# Patient Record
Sex: FEMALE | Race: WHITE | NOT HISPANIC OR LATINO | Employment: FULL TIME | URBAN - METROPOLITAN AREA
[De-identification: names, ages, dates, MRNs, and addresses within clinical notes are randomized per-mention and may not be internally consistent; named-entity substitution may affect disease eponyms.]

---

## 2017-06-27 ENCOUNTER — OFFICE VISIT (OUTPATIENT)
Dept: FAMILY MEDICINE | Facility: CLINIC | Age: 62
End: 2017-06-27
Payer: COMMERCIAL

## 2017-06-27 ENCOUNTER — TELEPHONE (OUTPATIENT)
Dept: FAMILY MEDICINE | Facility: CLINIC | Age: 62
End: 2017-06-27

## 2017-06-27 VITALS
BODY MASS INDEX: 28.17 KG/M2 | TEMPERATURE: 97.4 F | HEART RATE: 64 BPM | OXYGEN SATURATION: 96 % | WEIGHT: 159 LBS | SYSTOLIC BLOOD PRESSURE: 130 MMHG | HEIGHT: 63 IN | DIASTOLIC BLOOD PRESSURE: 74 MMHG

## 2017-06-27 DIAGNOSIS — M62.89 MUSCLE FATIGUE: Primary | ICD-10-CM

## 2017-06-27 DIAGNOSIS — R53.81 PHYSICAL DECONDITIONING: ICD-10-CM

## 2017-06-27 LAB
BASOPHILS # BLD AUTO: 0 10E9/L (ref 0–0.2)
BASOPHILS NFR BLD AUTO: 0.5 %
DIFFERENTIAL METHOD BLD: NORMAL
EOSINOPHIL # BLD AUTO: 0.1 10E9/L (ref 0–0.7)
EOSINOPHIL NFR BLD AUTO: 1.8 %
ERYTHROCYTE [DISTWIDTH] IN BLOOD BY AUTOMATED COUNT: 13.2 % (ref 10–15)
HCT VFR BLD AUTO: 40.7 % (ref 35–47)
HGB BLD-MCNC: 13.6 G/DL (ref 11.7–15.7)
LYMPHOCYTES # BLD AUTO: 2.5 10E9/L (ref 0.8–5.3)
LYMPHOCYTES NFR BLD AUTO: 32 %
MCH RBC QN AUTO: 29 PG (ref 26.5–33)
MCHC RBC AUTO-ENTMCNC: 33.4 G/DL (ref 31.5–36.5)
MCV RBC AUTO: 87 FL (ref 78–100)
MONOCYTES # BLD AUTO: 0.8 10E9/L (ref 0–1.3)
MONOCYTES NFR BLD AUTO: 9.4 %
NEUTROPHILS # BLD AUTO: 4.5 10E9/L (ref 1.6–8.3)
NEUTROPHILS NFR BLD AUTO: 56.3 %
PLATELET # BLD AUTO: 300 10E9/L (ref 150–450)
RBC # BLD AUTO: 4.69 10E12/L (ref 3.8–5.2)
WBC # BLD AUTO: 7.9 10E9/L (ref 4–11)

## 2017-06-27 PROCEDURE — 85025 COMPLETE CBC W/AUTO DIFF WBC: CPT | Performed by: NURSE PRACTITIONER

## 2017-06-27 PROCEDURE — 84443 ASSAY THYROID STIM HORMONE: CPT | Performed by: NURSE PRACTITIONER

## 2017-06-27 PROCEDURE — 80048 BASIC METABOLIC PNL TOTAL CA: CPT | Performed by: NURSE PRACTITIONER

## 2017-06-27 PROCEDURE — 99204 OFFICE O/P NEW MOD 45 MIN: CPT | Performed by: NURSE PRACTITIONER

## 2017-06-27 PROCEDURE — 82550 ASSAY OF CK (CPK): CPT | Performed by: NURSE PRACTITIONER

## 2017-06-27 PROCEDURE — 36415 COLL VENOUS BLD VENIPUNCTURE: CPT | Performed by: NURSE PRACTITIONER

## 2017-06-27 RX ORDER — DIPHENOXYLATE HYDROCHLORIDE AND ATROPINE SULFATE 2.5; .025 MG/1; MG/1
1 TABLET ORAL DAILY
COMMUNITY
Start: 2012-06-22 | End: 2022-12-26

## 2017-06-27 NOTE — PROGRESS NOTES
"  SUBJECTIVE:                                                    Izabella Concepcion is a 61 year old female who presents to clinic today for the following health issues:      Musculoskeletal problem/pain      Duration: ongoing    Description  Location: both legs, generalized    Intensity:  mild, moderate    Accompanying signs and symptoms: weakness of legs, no fever , chills, rash, joint pain, headache, back pain;  Feels a lumpiness in her legs;  Denies chest pain or SOB , no new swelling of legs or ankles    History  Previous similar problem: YES- ongoing  Previous evaluation:  none    Precipitating or alleviating factors:  Trauma or overuse: no   Aggravating factors include: climbing stairs    Therapies tried and outcome: nothing  Has an appt to establish with Dr Wang for early August     Problem list and histories reviewed & adjusted, as indicated.  Additional history: as documented    There is no problem list on file for this patient.    History reviewed. No pertinent surgical history.    Social History   Substance Use Topics     Smoking status: Former Smoker     Smokeless tobacco: Never Used     Alcohol use Yes     History reviewed. No pertinent family history.      Current Outpatient Prescriptions   Medication Sig Dispense Refill     Multiple Vitamin (MULTI-VITAMINS) TABS Take 1 tablet by mouth       Allergies   Allergen Reactions     Vigamox [Moxifloxacin] Swelling       Reviewed and updated as needed this visit by clinical staff       Reviewed and updated as needed this visit by Provider         ROS:  Constitutional, HEENT, cardiovascular, pulmonary, gi and gu systems are negative, except as otherwise noted.  See HPI    OBJECTIVE:     /74 (BP Location: Left arm, Cuff Size: Adult Regular)  Pulse 64  Temp 97.4  F (36.3  C) (Tympanic)  Ht 5' 3\" (1.6 m)  Wt 159 lb (72.1 kg)  SpO2 96%  BMI 28.17 kg/m2  Body mass index is 28.17 kg/(m^2).  GENERAL: overweight,  alert and no distress  EYES: Eyes grossly " normal to inspection, PERRL and conjunctivae and sclerae normal  HENT: ear canals and TM's normal, nose and mouth without ulcers or lesions  NECK: no adenopathy, no asymmetry, masses, or scars and thyroid normal to palpation  RESP: lungs clear to auscultation - no rales, rhonchi or wheezes  CV: regular rate and rhythm, normal S1 S2, no S3 or S4, no murmur, click or rub, no peripheral edema and peripheral pulses strong  ABDOMEN: soft, nontender, no hepatosplenomegaly, no masses and bowel sounds normal  MS: no gross musculoskeletal defects noted, no edema, adipose tissue lumpy  SKIN: no suspicious lesions or rashes  PSYCH: mentation appears normal, affect normal/bright    Diagnostic Test Results: Addended   Results for orders placed or performed in visit on 06/27/17   CBC with platelets differential   Result Value Ref Range    WBC 7.9 4.0 - 11.0 10e9/L    RBC Count 4.69 3.8 - 5.2 10e12/L    Hemoglobin 13.6 11.7 - 15.7 g/dL    Hematocrit 40.7 35.0 - 47.0 %    MCV 87 78 - 100 fl    MCH 29.0 26.5 - 33.0 pg    MCHC 33.4 31.5 - 36.5 g/dL    RDW 13.2 10.0 - 15.0 %    Platelet Count 300 150 - 450 10e9/L    Diff Method Automated Method     % Neutrophils 56.3 %    % Lymphocytes 32.0 %    % Monocytes 9.4 %    % Eosinophils 1.8 %    % Basophils 0.5 %    Absolute Neutrophil 4.5 1.6 - 8.3 10e9/L    Absolute Lymphocytes 2.5 0.8 - 5.3 10e9/L    Absolute Monocytes 0.8 0.0 - 1.3 10e9/L    Absolute Eosinophils 0.1 0.0 - 0.7 10e9/L    Absolute Basophils 0.0 0.0 - 0.2 10e9/L   CK total   Result Value Ref Range    CK Total 134 30 - 225 U/L   Basic metabolic panel  (Ca, Cl, CO2, Creat, Gluc, K, Na, BUN)   Result Value Ref Range    Sodium 140 133 - 144 mmol/L    Potassium 4.3 3.4 - 5.3 mmol/L    Chloride 107 94 - 109 mmol/L    Carbon Dioxide 29 20 - 32 mmol/L    Anion Gap 4 3 - 14 mmol/L    Glucose 88 70 - 99 mg/dL    Urea Nitrogen 19 7 - 30 mg/dL    Creatinine 0.69 0.52 - 1.04 mg/dL    GFR Estimate 85 >60 mL/min/1.7m2    GFR Estimate If  Black >90   GFR Calc   >60 mL/min/1.7m2    Calcium 9.5 8.5 - 10.1 mg/dL   TSH with free T4 reflex   Result Value Ref Range    TSH 0.81 0.40 - 4.00 mU/L       ASSESSMENT/PLAN:         ICD-10-CM    1. Muscle fatigue M62.89 CBC with platelets differential     CK total     Basic metabolic panel  (Ca, Cl, CO2, Creat, Gluc, K, Na, BUN)     TSH with free T4 reflex   2. Physical deconditioning R53.81    begin exercise regimen by pacing acitivity starting with walking, also suggest beginning stairs  Follow up with Dr Wang as planned  LUIGI Dwyer [facility]

## 2017-06-27 NOTE — LETTER
[facility]  [address] [place] [address] [facility]  [address]  [place]  [address]  Tel: [phone]    July 3, 2017    Izabella Concepcion  [address] [address]        Dear Ms. Concepcion,    All of the tests we ran to assess your muscle fatigue turned out great.  No thyroid abnormality, nor muscle enzyme elevation,  Electrolytes are normal and no evidence of anemia or infection.  Please follow up if you continue to have questions or problems, and follow up with Dr Jacob as planned.    If you have any further questions or problems, please contact our office.      Sincerely,    Dorina Tai NP/wilbert    Results for orders placed or performed in visit on 06/27/17   CBC with platelets differential   Result Value Ref Range    WBC 7.9 4.0 - 11.0 10e9/L    RBC Count 4.69 3.8 - 5.2 10e12/L    Hemoglobin 13.6 11.7 - 15.7 g/dL    Hematocrit 40.7 35.0 - 47.0 %    MCV 87 78 - 100 fl    MCH 29.0 26.5 - 33.0 pg    MCHC 33.4 31.5 - 36.5 g/dL    RDW 13.2 10.0 - 15.0 %    Platelet Count 300 150 - 450 10e9/L    Diff Method Automated Method     % Neutrophils 56.3 %    % Lymphocytes 32.0 %    % Monocytes 9.4 %    % Eosinophils 1.8 %    % Basophils 0.5 %    Absolute Neutrophil 4.5 1.6 - 8.3 10e9/L    Absolute Lymphocytes 2.5 0.8 - 5.3 10e9/L    Absolute Monocytes 0.8 0.0 - 1.3 10e9/L    Absolute Eosinophils 0.1 0.0 - 0.7 10e9/L    Absolute Basophils 0.0 0.0 - 0.2 10e9/L   CK total   Result Value Ref Range    CK Total 134 30 - 225 U/L   Basic metabolic panel  (Ca, Cl, CO2, Creat, Gluc, K, Na, BUN)   Result Value Ref Range    Sodium 140 133 - 144 mmol/L    Potassium 4.3 3.4 - 5.3 mmol/L    Chloride 107 94 - 109 mmol/L    Carbon Dioxide 29 20 - 32 mmol/L    Anion Gap 4 3 - 14 mmol/L    Glucose 88 70 - 99 mg/dL    Urea Nitrogen 19 7 - 30 mg/dL    Creatinine 0.69 0.52 - 1.04 mg/dL    GFR Estimate 85 >60 mL/min/1.7m2    GFR Estimate If Black >90   GFR Calc   >60 mL/min/1.7m2    Calcium 9.5 8.5 - 10.1 mg/dL   TSH with  free T4 reflex   Result Value Ref Range    TSH 0.81 0.40 - 4.00 mU/L           Enclosure: Lab Results

## 2017-06-27 NOTE — NURSING NOTE
"Chief Complaint   Patient presents with     Musculoskeletal Problem       Initial /74 (BP Location: Left arm, Cuff Size: Adult Regular)  Pulse 64  Temp 97.4  F (36.3  C) (Tympanic)  Ht 5' 3\" (1.6 m)  Wt 159 lb (72.1 kg)  SpO2 96%  BMI 28.17 kg/m2 Estimated body mass index is 28.17 kg/(m^2) as calculated from the following:    Height as of this encounter: 5' 3\" (1.6 m).    Weight as of this encounter: 159 lb (72.1 kg).  Medication Reconciliation: complete     Suellen Hernandez MA    "

## 2017-06-27 NOTE — TELEPHONE ENCOUNTER
Reason for Call:  Other     Detailed comments: Lumpy painful leg muscules    Phone Number Patient can be reached at: Home number on file [phone] (home)    Best Time: anytime    Can we leave a detailed message on this number? YES    Call taken on 6/27/2017 at 7:09 AM by Avery Gomez

## 2017-06-27 NOTE — MR AVS SNAPSHOT
After Visit Summary   6/27/2017    Izabella Concepcion    MRN: [id number]           Patient Information     Date Of Birth          1955        Visit Information        Provider Department      6/27/2017 4:00 PM Dorina Tai APRN CNP [facility]        Today's Diagnoses     Muscle fatigue    -  1       Follow-ups after your visit        Your next 10 appointments already scheduled     Jun 28, 2017  9:15 AM CDT   MA SCREENING DIGITAL BILATERAL with [id number]   [facility] ([facility])    [address], [address]  [facility] [id number] [phone]           Do not use any powder, lotion or deodorant under your arms or on your breast. If you do, we will ask you to remove it before your exam.  Wear comfortable, two-piece clothing.  If you have any allergies, tell your care team.  Bring any previous mammograms from other facilities or have them mailed to the breast center. This mammogram location, [facility], now offers 3D mammography. It doesn't replace a screening mammogram and can be done with a regular screening mammogram. It is optional and not all insurances will pay for it. 3D mammography is a special kind of mammogram that produces a three-dimensional image of the breast by using low dose-xrays. 3D allows the radiologist to see the breast tissue differently from 2D, which reduces the chance of repeat testing due to overlapping breast tissue. If you are interested in have a 3D mammogram, please check with your insurance before you arrive for your exam. On the day of your exam you will be asked if you would like 3D imaging.            Aug 03, 2017  6:00 PM CDT   Office Visit with Blanco Wang MD   [facility]    [address] [id number] [phone]           Bring a current list of meds and any records pertaining to this visit.  For Physicals, please bring  "immunization records and any forms needing to be filled out.  Please arrive 10 minutes early to complete paperwork.              Future tests that were ordered for you today     Open Future Orders        Priority Expected Expires Ordered    MA Screening Digital Bilateral Routine  2018            Who to contact     If you have questions or need follow up information about today's clinic visit or your schedule please contact [facility] directly at [phone].  Normal or non-critical lab and imaging results will be communicated to you by [employer], letter or phone within 4 business days after the clinic has received the results. If you do not hear from us within 7 days, please contact the clinic through [employer] or phone. If you have a critical or abnormal lab result, we will notify you by phone as soon as possible.  Submit refill requests through [employer] or call your pharmacy and they will forward the refill request to us. Please allow 3 business days for your refill to be completed.          Additional Information About Your Visit        [employer] Information     [employer] lets you send messages to your doctor, view your test results, renew your prescriptions, schedule appointments and more. To sign up, go to [email or web address] . Click on \"Log in\" on the left side of the screen, which will take you to the Welcome page. Then click on \"Sign up Now\" on the right side of the page.     You will be asked to enter the access code listed below, as well as some personal information. Please follow the directions to create your username and password.     Your access code is: [email or web address]  Expires: 2017  4:35 PM     Your access code will  in 90 days. If you need help or a new code, please call your [place] clinic or [phone].        Care EveryWhere ID     This is your Care EveryWhere ID. This could be used by other organizations to access your [place] medical records  [id number]      " "  Your Vitals Were     Pulse Temperature Height Pulse Oximetry BMI (Body Mass Index)       64 97.4  F (36.3  C) (Tympanic) 5' 3\" (1.6 m) 96% 28.17 kg/m2        Blood Pressure from Last 3 Encounters:   06/27/17 130/74    Weight from Last 3 Encounters:   06/27/17 159 lb (72.1 kg)              We Performed the Following     Basic metabolic panel  (Ca, Cl, CO2, Creat, Gluc, K, Na, BUN)     CBC with platelets differential     CK total     TSH with free T4 reflex        Primary Care Provider    None Specified       No primary provider on file.        Equal Access to Services     [facility]: Hadroberta Dixon, usha freitas, robin bernabe, karo zarate . So [facility] [phone].    ATENCIÓN: Si habla español, tiene a curiel disposición servicios gratuitos de asistencia lingüística. Llame al [phone].    We comply with applicable federal civil rights laws and [place] laws. We do not discriminate on the basis of race, color, national origin, age, disability sex, sexual orientation or gender identity.            Thank you!     Thank you for choosing [facility]  for your care. Our goal is always to provide you with excellent care. Hearing back from our patients is one way we can continue to improve our services. Please take a few minutes to complete the written survey that you may receive in the mail after your visit with us. Thank you!             Your Updated Medication List - Protect others around you: Learn how to safely use, store and throw away your medicines at www.disposemymeds.org.          This list is accurate as of: 6/27/17  4:35 PM.  Always use your most recent med list.                   Brand Name Dispense Instructions for use Diagnosis    MULTI-VITAMINS Tabs      Take 1 tablet by mouth          "

## 2017-06-27 NOTE — TELEPHONE ENCOUNTER
Closing this encounter due to the patient decided to come in today and  See Dorina Tai    Thank you

## 2017-06-28 ENCOUNTER — HOSPITAL ENCOUNTER (OUTPATIENT)
Dept: MAMMOGRAPHY | Facility: CLINIC | Age: 62
Discharge: HOME | End: 2017-06-28
Attending: OBSTETRICS & GYNECOLOGY | Admitting: OBSTETRICS & GYNECOLOGY
Payer: COMMERCIAL

## 2017-06-28 DIAGNOSIS — Z12.31 VISIT FOR SCREENING MAMMOGRAM: ICD-10-CM

## 2017-06-28 LAB
ANION GAP SERPL CALCULATED.3IONS-SCNC: 4 MMOL/L (ref 3–14)
BUN SERPL-MCNC: 19 MG/DL (ref 7–30)
CALCIUM SERPL-MCNC: 9.5 MG/DL (ref 8.5–10.1)
CHLORIDE SERPL-SCNC: 107 MMOL/L (ref 94–109)
CK SERPL-CCNC: 134 U/L (ref 30–225)
CO2 SERPL-SCNC: 29 MMOL/L (ref 20–32)
CREAT SERPL-MCNC: 0.69 MG/DL (ref 0.52–1.04)
GFR SERPL CREATININE-BSD FRML MDRD: 85 ML/MIN/1.7M2
GLUCOSE SERPL-MCNC: 88 MG/DL (ref 70–99)
POTASSIUM SERPL-SCNC: 4.3 MMOL/L (ref 3.4–5.3)
SODIUM SERPL-SCNC: 140 MMOL/L (ref 133–144)
TSH SERPL DL<=0.005 MIU/L-ACNC: 0.81 MU/L (ref 0.4–4)

## 2017-06-28 PROCEDURE — G0202 SCR MAMMO BI INCL CAD: HCPCS

## 2017-07-01 NOTE — PROGRESS NOTES
All of the tests we ran to assess your muscle fatigue turned out great.  No thyroid abnormality, nor muscle enzyme elevation,  Electrolytes are normal and no evidence of anemia or infection.  Please follow up if you continue to have questions or problems, and follow up with Dr Jacob as planned.

## 2017-08-14 ENCOUNTER — TRANSFERRED RECORDS (OUTPATIENT)
Dept: HEALTH INFORMATION MANAGEMENT | Facility: CLINIC | Age: 62
End: 2017-08-14

## 2017-08-30 ENCOUNTER — TRANSFERRED RECORDS (OUTPATIENT)
Dept: HEALTH INFORMATION MANAGEMENT | Facility: CLINIC | Age: 62
End: 2017-08-30

## 2017-08-30 LAB — PAP SMEAR - HIM PATIENT REPORTED: NEGATIVE

## 2017-09-15 ENCOUNTER — OFFICE VISIT (OUTPATIENT)
Dept: FAMILY MEDICINE | Facility: CLINIC | Age: 62
End: 2017-09-15
Payer: COMMERCIAL

## 2017-09-15 VITALS
TEMPERATURE: 98.3 F | SYSTOLIC BLOOD PRESSURE: 108 MMHG | WEIGHT: 158.3 LBS | HEART RATE: 73 BPM | BODY MASS INDEX: 28.05 KG/M2 | OXYGEN SATURATION: 96 % | DIASTOLIC BLOOD PRESSURE: 70 MMHG | HEIGHT: 63 IN

## 2017-09-15 DIAGNOSIS — E78.5 HYPERLIPIDEMIA LDL GOAL <130: ICD-10-CM

## 2017-09-15 DIAGNOSIS — R29.898 WEAKNESS OF BOTH LOWER EXTREMITIES: Primary | ICD-10-CM

## 2017-09-15 DIAGNOSIS — Z23 NEED FOR PROPHYLACTIC VACCINATION AND INOCULATION AGAINST INFLUENZA: ICD-10-CM

## 2017-09-15 DIAGNOSIS — J45.40 MODERATE PERSISTENT ASTHMA WITHOUT COMPLICATION: ICD-10-CM

## 2017-09-15 DIAGNOSIS — Z78.9 STATIN INTOLERANCE: ICD-10-CM

## 2017-09-15 DIAGNOSIS — Z23 NEED FOR PROPHYLACTIC VACCINATION WITH TETANUS-DIPHTHERIA (TD): ICD-10-CM

## 2017-09-15 PROCEDURE — 99214 OFFICE O/P EST MOD 30 MIN: CPT | Performed by: INTERNAL MEDICINE

## 2017-09-15 RX ORDER — ALBUTEROL SULFATE 90 UG/1
2 AEROSOL, METERED RESPIRATORY (INHALATION) EVERY 6 HOURS PRN
Qty: 1 INHALER | Refills: 1 | COMMUNITY
Start: 2017-09-15 | End: 2017-09-15

## 2017-09-15 RX ORDER — ALBUTEROL SULFATE 90 UG/1
2 AEROSOL, METERED RESPIRATORY (INHALATION) EVERY 6 HOURS PRN
Qty: 1 INHALER | Refills: 11 | Status: SHIPPED | OUTPATIENT
Start: 2017-09-15 | End: 2017-11-30

## 2017-09-15 RX ORDER — CLONAZEPAM 0.25 MG/1
0.25 TABLET, ORALLY DISINTEGRATING ORAL 2 TIMES DAILY PRN
COMMUNITY
Start: 2015-04-16 | End: 2017-09-15

## 2017-09-15 RX ORDER — VITAMIN B COMPLEX
1 TABLET ORAL DAILY
COMMUNITY
Start: 2015-04-16 | End: 2018-10-21

## 2017-09-15 NOTE — NURSING NOTE
"Chief Complaint   Patient presents with     Establish Care       Initial /70 (BP Location: Right arm, Patient Position: Chair, Cuff Size: Adult Regular)  Pulse 73  Temp 98.3  F (36.8  C) (Oral)  Ht 5' 3\" (1.6 m)  Wt 158 lb 4.8 oz (71.8 kg)  SpO2 96%  BMI 28.04 kg/m2 Estimated body mass index is 28.04 kg/(m^2) as calculated from the following:    Height as of this encounter: 5' 3\" (1.6 m).    Weight as of this encounter: 158 lb 4.8 oz (71.8 kg).  Medication Reconciliation: complete   Sabra Medrano MA  "

## 2017-09-15 NOTE — PROGRESS NOTES
"  SUBJECTIVE:   Izabella Concepcion is a 61 year old female who presents to clinic today for the following health issues:    New Patient/Transfer of Care    She describes gradual onset of progressive weakness in bilateral legs over 2-3 years.   She also describes a burning sensation in skin of bilateral legs that is intermittent and mild and occurs when she is resting after exercise.  Despite this, she tries to walk 5-6 miles per day for exercise.   No similar symptoms in her arms.  Since she has tried to exercise more, recently, she feels like she is 50-60% better, and feels like these symptoms may be \"on the mend\" as she is getting more conditioned.  Her conditioning has been derailed in the past due to asthma that had been difficult to diagnose, and a recent right arm fracture.   She has hyperlipidemia and statin intolerance.    Problem list and histories reviewed & adjusted, as indicated.  Additional history: as documented    Patient Active Problem List   Diagnosis     Moderate persistent asthma without complication     Statin intolerance     Hyperlipidemia LDL goal <130     Past Surgical History:   Procedure Laterality Date     ORTHOPEDIC SURGERY Right     right arm fracture     TONSILLECTOMY         Social History   Substance Use Topics     Smoking status: Former Smoker     Packs/day: 2.00     Years: 6.00     Smokeless tobacco: Never Used     Alcohol use Yes      Comment: 5-6 drinks per month     Family History   Problem Relation Age of Onset     CEREBROVASCULAR DISEASE Mother      hemorrhagic stroke     Myocardial Infarction Father 29     DIABETES Father          Current Outpatient Prescriptions   Medication Sig Dispense Refill     Ascorbic Acid (C-500) 500 MG CHEW Take 500 mg by mouth daily       B Complex Vitamins (VITAMIN-B COMPLEX) TABS Take 1 tablet by mouth daily       Multiple Vitamin (MULTI-VITAMINS) TABS Take 1 tablet by mouth       clonazePAM (KLONOPIN) 0.25 MG TBDP ODT tab Take 0.25 mg by mouth 2 times " "daily as needed       ASMANEX 120 METERED DOSES 220 MCG/INH Inhaler Inhale 2 puffs into the lungs daily as needed  1     Allergies   Allergen Reactions     Vigamox [Moxifloxacin] Swelling         Reviewed and updated as needed this visit by clinical staff  Tobacco  Allergies  Meds  Med Hx  Surg Hx  Fam Hx  Soc Hx      Reviewed and updated as needed this visit by Provider  Tobacco  Med Hx  Surg Hx  Fam Hx  Soc Hx        ROS:  Constitutional, HEENT, cardiovascular, pulmonary, gi and gu systems are negative, except as otherwise noted.      OBJECTIVE:   /70 (BP Location: Right arm, Patient Position: Chair, Cuff Size: Adult Regular)  Pulse 73  Temp 98.3  F (36.8  C) (Oral)  Ht 5' 3\" (1.6 m)  Wt 158 lb 4.8 oz (71.8 kg)  SpO2 96%  BMI 28.04 kg/m2  Body mass index is 28.04 kg/(m^2).  GENERAL: healthy, alert and no distress  EYES: Eyes grossly normal to inspection, PERRL and conjunctivae and sclerae normal  HENT: ear canals and TM's normal, nose and mouth without ulcers or lesions  NECK: no adenopathy, no asymmetry, masses, or scars and thyroid normal to palpation  RESP: lungs clear to auscultation - no rales, rhonchi or wheezes  CV: regular rate and rhythm, normal S1 S2, no S3 or S4, no murmur, click or rub, no peripheral edema and peripheral pulses strong  ABDOMEN: soft, nontender, no hepatosplenomegaly, no masses and bowel sounds normal  MS: no gross musculoskeletal defects noted, no edema  SKIN: no suspicious lesions or rashes  NEURO: Cranial nerves 2-12 appear grossly intact.   Normal strength in all muscle groups, normal sensation to light touch in all lower extremity dermatomes, deep tendon reflexes are 2+ bilaterally at the patella and Achilles tendons, straight leg raises do not elicit radicular symptoms bilaterally.  PSYCH: mentation appears normal, affect normal/bright    Diagnostic Test Results:  Reviewed lipid panel from 2015 demonstrating severe hyperlipidemia, but her 10 year risk for " atherosclerotic vascular disease is less than 7.5%    ASSESSMENT/PLAN:       1. Weakness of both lower extremities  The improving weakness and dysesthesias in her bilateral legs have an unclear etiology. I am encouraged that her symptoms seem to be improving with conditioning. The patient and I agreed that we would give extra time to see if further conditioning can resolve her symptoms prior to initiating a diagnostic evaluation. If further conditioning does not improve her symptoms adequately, consider imaging of the neuro access to exclude demyelinating conditions, electromyography studies of the lower extremities, HONEY? She will contact me sooner to initiate those evaluations if her symptoms seem to worsen between now and our planned follow-up visit in 2-3 months.    2. Moderate persistent asthma without complication  She questioned whether she needs to continue taking her Asmanex, she has not been taking it for several weeks. I recommended that she return to taking Asmanex, and that we recheck an asthma control test when she returns in 2-3 months. I also updated her prescription for her rescue inhaler.  - albuterol (PROAIR HFA/PROVENTIL HFA/VENTOLIN HFA) 108 (90 BASE) MCG/ACT Inhaler; Inhale 2 puffs into the lungs every 6 hours as needed for shortness of breath / dyspnea or wheezing  Dispense: 1 Inhaler; Refill: 11  - ASMANEX 120 METERED DOSES 220 MCG/INH Inhaler; Inhale 2 puffs into the lungs daily as needed  Dispense: 1 Inhaler; Refill: 11    3. Statin intolerance      4. Hyperlipidemia LDL goal <130  Her cholesterol is markedly elevated, however her 10 year risk is not appreciably elevated, I suggested that she could try to improve her LDL cholesterol by eating more fiber. I even recommended Dr. Ocampo' line of food products that are designed to reduce cholesterol in patients with statin intolerance.    5. Need for prophylactic vaccination with tetanus-diphtheria (TD)  She declined a tetanus booster  today    6. Need for prophylactic vaccination and inoculation against influenza  She declined influenza vaccine today      FUTURE APPOINTMENTS:       - Follow-up visit in 2-3 months to follow up on above issues or sooner if needed    Blanco Wang MD  [facility]

## 2017-09-15 NOTE — MR AVS SNAPSHOT
After Visit Summary   9/15/2017    Izabella Concepcion    MRN: [id number]           Patient Information     Date Of Birth          1955        Visit Information        Provider Department      9/15/2017 1:00 PM Blanco Wang MD [facility]        Today's Diagnoses     Weakness of both lower extremities    -  1    Moderate persistent asthma without complication        Statin intolerance        Hyperlipidemia LDL goal <130        Need for prophylactic vaccination with tetanus-diphtheria (TD)        Need for prophylactic vaccination and inoculation against influenza           Follow-ups after your visit        Your next 10 appointments already scheduled     Nov 30, 2017 10:00 AM CST   Office Visit with Blanco Wang MD   [facility]    [address] [facility] [address] [phone]           Bring a current list of meds and any records pertaining to this visit. For Physicals, please bring immunization records and any forms needing to be filled out. Please arrive 10 minutes early to complete paperwork.              Who to contact     If you have questions or need follow up information about today's clinic visit or your schedule please contact [facility] directly at [phone].  Normal or non-critical lab and imaging results will be communicated to you by [employer], letter or phone within 4 business days after the clinic has received the results. If you do not hear from us within 7 days, please contact the clinic through [employer] or phone. If you have a critical or abnormal lab result, we will notify you by phone as soon as possible.  Submit refill requests through [employer] or call your pharmacy and they will forward the refill request to us. Please allow 3 business days for your refill to be completed.          Additional Information About Your Visit        [employer] Information     [employer] lets you send messages to your doctor, view  "your test results, renew your prescriptions, schedule appointments and more. To sign up, go to [email or web address] . Click on \"Log in\" on the left side of the screen, which will take you to the Welcome page. Then click on \"Sign up Now\" on the right side of the page.     You will be asked to enter the access code listed below, as well as some personal information. Please follow the directions to create your username and password.     Your access code is: [email or web address]  Expires: 2017  4:35 PM     Your access code will  in 90 days. If you need help or a new code, please call your [place] clinic or [phone].        Care EveryWhere ID     This is your Care EveryWhere ID. This could be used by other organizations to access your [place] medical records  [id number]        Your Vitals Were     Pulse Temperature Height Pulse Oximetry BMI (Body Mass Index)       73 98.3  F (36.8  C) (Oral) 5' 3\" (1.6 m) 96% 28.04 kg/m2        Blood Pressure from Last 3 Encounters:   09/15/17 108/70   17 130/74    Weight from Last 3 Encounters:   09/15/17 158 lb 4.8 oz (71.8 kg)   17 159 lb (72.1 kg)              We Performed the Following     QualtrÃ© ACCESS CODE - Add PCP and Click on cosign on right          Today's Medication Changes          These changes are accurate as of: 9/15/17  5:40 PM.  If you have any questions, ask your nurse or doctor.               Stop taking these medicines if you haven't already. Please contact your care team if you have questions.     clonazePAM 0.25 MG Tbdp ODT tab   Commonly known as:  klonoPIN   Stopped by:  Blanco Wang MD                Where to get your medicines      These medications were sent to [facility], [place] - [address]  [address], [address], [facility] [address]     Phone:  [phone]     albuterol 108 (90 BASE) MCG/ACT Inhaler    ASMANEX 120 METERED DOSES 220 MCG/INH Inhaler                Primary Care " Provider Office Phone # Fax #    Blanco Wang [phone]       [facility] [address] [facility] [address]  [facility] [address]        Equal Access to Services     DIONNE MONTOYA : Hadii aad ku hadsindhuo Soomaali, waaxda luqadaha, qaybta kaalmada adeegyada, waxankur salasn britta lemons tessa villaseñor. So [facility] [phone].    ATENCIÓN: Si habla español, tiene a curiel disposición servicios gratuitos de asistencia lingüística. Llame al [phone].    We comply with applicable federal civil rights laws and [place] laws. We do not discriminate on the basis of race, color, national origin, age, disability sex, sexual orientation or gender identity.            Thank you!     Thank you for choosing [facility]  for your care. Our goal is always to provide you with excellent care. Hearing back from our patients is one way we can continue to improve our services. Please take a few minutes to complete the written survey that you may receive in the mail after your visit with us. Thank you!             Your Updated Medication List - Protect others around you: Learn how to safely use, store and throw away your medicines at www.disposemymeds.org.          This list is accurate as of: 9/15/17  5:40 PM.  Always use your most recent med list.                   Brand Name Dispense Instructions for use Diagnosis    albuterol 108 (90 BASE) MCG/ACT Inhaler    PROAIR HFA/PROVENTIL HFA/VENTOLIN HFA    1 Inhaler    Inhale 2 puffs into the lungs every 6 hours as needed for shortness of breath / dyspnea or wheezing    Moderate persistent asthma without complication       ASMANEX 120 METERED DOSES 220 MCG/INH Inhaler   Generic drug:  mometasone     1 Inhaler    Inhale 2 puffs into the lungs daily as needed    Moderate persistent asthma without complication       C-500 500 MG Chew   Generic drug:  Ascorbic Acid      Take 500 mg by mouth daily        MULTI-VITAMINS Tabs      Take 1 tablet by mouth        Vitamin-B  Complex Tabs      Take 1 tablet by mouth daily

## 2017-09-16 ENCOUNTER — HEALTH MAINTENANCE LETTER (OUTPATIENT)
Age: 62
End: 2017-09-16

## 2017-09-16 ASSESSMENT — ASTHMA QUESTIONNAIRES: ACT_TOTALSCORE: 20

## 2017-10-31 NOTE — PROGRESS NOTES
SUBJECTIVE:   Izabella Concepcion is a 61 year old female who presents to clinic today for the following health issues:      ED/UC Followup:    Facility:  [facility]  Date of visit: 10/28/17  Reason for visit: Cough  Current Status: at home       Cough and URI symptoms starting on 10/10  Seen in urgent care on 10/28 and prescribed Z-pack and albuterol solution, but she did not have nebulizer machine  She feels better, but not perfect   She has improved cough with sputum, she denies fevers, describes fatigue and mild dyspnea     Problem list and histories reviewed & adjusted, as indicated.  Additional history: as documented    Patient Active Problem List   Diagnosis     Moderate persistent asthma without complication     Statin intolerance     Hyperlipidemia LDL goal <130     Past Surgical History:   Procedure Laterality Date     ORTHOPEDIC SURGERY Right     right arm fracture     TONSILLECTOMY         Social History   Substance Use Topics     Smoking status: Former Smoker     Packs/day: 2.00     Years: 6.00     Smokeless tobacco: Never Used     Alcohol use Yes      Comment: 5-6 drinks per month     Family History   Problem Relation Age of Onset     CEREBROVASCULAR DISEASE Mother      hemorrhagic stroke     Myocardial Infarction Father 29     DIABETES Father          Current Outpatient Prescriptions   Medication Sig Dispense Refill     Ascorbic Acid (C-500) 500 MG CHEW Take 500 mg by mouth daily       B Complex Vitamins (VITAMIN-B COMPLEX) TABS Take 1 tablet by mouth daily       albuterol (PROAIR HFA/PROVENTIL HFA/VENTOLIN HFA) 108 (90 BASE) MCG/ACT Inhaler Inhale 2 puffs into the lungs every 6 hours as needed for shortness of breath / dyspnea or wheezing 1 Inhaler 11     ASMANEX 120 METERED DOSES 220 MCG/INH Inhaler Inhale 2 puffs into the lungs daily as needed 1 Inhaler 11     Multiple Vitamin (MULTI-VITAMINS) TABS Take 1 tablet by mouth       Allergies   Allergen Reactions     Vigamox [Moxifloxacin] Swelling  "        Reviewed and updated as needed this visit by clinical staff       Reviewed and updated as needed this visit by Provider         ROS:    Her leg symptoms have improved  She snores and sometimes feels tired in the day, but she does not want to see a sleep specialist for consideration of a sleep study     OBJECTIVE:     /64 (BP Location: Left arm, Patient Position: Chair, Cuff Size: Adult Regular)  Pulse 66  Temp 98.1  F (36.7  C) (Tympanic)  Ht 5' 3\" (1.6 m)  Wt 158 lb (71.7 kg)  SpO2 98%  BMI 27.99 kg/m2  Body mass index is 27.99 kg/(m^2).  GENERAL: healthy, alert and no distress  EYES: Eyes grossly normal to inspection, PERRL and conjunctivae and sclerae normal  HENT: ear canals and TM's normal, nose and mouth without ulcers or lesions  NECK: no adenopathy, no asymmetry, masses, or scars and thyroid normal to palpation  RESP: lungs clear to auscultation - no rales, rhonchi or wheezes  CV: Heart with regular rate and rhythm   MS: no gross musculoskeletal defects noted, no edema  NEURO: Normal strength and tone, mentation intact and speech normal  PSYCH: mentation appears normal, affect normal/bright    Diagnostic Test Results:  none     ASSESSMENT/PLAN:       1. Upper respiratory tract infection, unspecified type  Improving  No clinical evidence of pneumonia   Continue current cares  May take additional 4-6 weeks for all symptoms particularly cough to resolve    2. Moderate persistent asthma without complication  Continue albuterol and asmanex       FUTURE APPOINTMENTS:       - She will return later this month fasting for lipid panel     Blanco Wang MD  [facility]          "

## 2017-11-01 ENCOUNTER — OFFICE VISIT (OUTPATIENT)
Dept: FAMILY MEDICINE | Facility: CLINIC | Age: 62
End: 2017-11-01
Payer: COMMERCIAL

## 2017-11-01 VITALS
DIASTOLIC BLOOD PRESSURE: 64 MMHG | HEART RATE: 66 BPM | SYSTOLIC BLOOD PRESSURE: 105 MMHG | WEIGHT: 158 LBS | OXYGEN SATURATION: 98 % | BODY MASS INDEX: 28 KG/M2 | TEMPERATURE: 98.1 F | HEIGHT: 63 IN

## 2017-11-01 DIAGNOSIS — J45.40 MODERATE PERSISTENT ASTHMA WITHOUT COMPLICATION: ICD-10-CM

## 2017-11-01 DIAGNOSIS — J06.9 UPPER RESPIRATORY TRACT INFECTION, UNSPECIFIED TYPE: Primary | ICD-10-CM

## 2017-11-01 PROCEDURE — 99213 OFFICE O/P EST LOW 20 MIN: CPT | Performed by: INTERNAL MEDICINE

## 2017-11-01 NOTE — NURSING NOTE
"Chief Complaint   Patient presents with     ER/UC Follow UP        Initial /64 (BP Location: Left arm, Patient Position: Chair, Cuff Size: Adult Regular)  Pulse 66  Temp 98.1  F (36.7  C) (Tympanic)  Ht 5' 3\" (1.6 m)  Wt 158 lb (71.7 kg)  SpO2 98%  BMI 27.99 kg/m2 Estimated body mass index is 27.99 kg/(m^2) as calculated from the following:    Height as of this encounter: 5' 3\" (1.6 m).    Weight as of this encounter: 158 lb (71.7 kg)..    BP completed using cuff size: regular  MEDICATIONS REVIEWED  SOCIAL AND FAMILY HX REVIEWED  Cuca Hathaway CMA  "

## 2017-11-01 NOTE — MR AVS SNAPSHOT
"              After Visit Summary   11/1/2017    Izabella Concepcion    MRN: [id number]           Patient Information     Date Of Birth          1955        Visit Information        Provider Department      11/1/2017 2:00 PM Blanco Wang MD [facility]        Today's Diagnoses     Upper respiratory tract infection, unspecified type    -  1    Moderate persistent asthma without complication           Follow-ups after your visit        Your next 10 appointments already scheduled     Nov 30, 2017 10:00 AM CST   Office Visit with Blanco Wang MD   [facility]    [address] [facility] [id number] [phone]           Bring a current list of meds and any records pertaining to this visit. For Physicals, please bring immunization records and any forms needing to be filled out. Please arrive 10 minutes early to complete paperwork.              Who to contact     If you have questions or need follow up information about today's clinic visit or your schedule please contact [facility] directly at [phone].  Normal or non-critical lab and imaging results will be communicated to you by hetrashart, letter or phone within 4 business days after the clinic has received the results. If you do not hear from us within 7 days, please contact the clinic through [employer] or phone. If you have a critical or abnormal lab result, we will notify you by phone as soon as possible.  Submit refill requests through [employer] or call your pharmacy and they will forward the refill request to us. Please allow 3 business days for your refill to be completed.          Additional Information About Your Visit        MyChart Information     [employer] lets you send messages to your doctor, view your test results, renew your prescriptions, schedule appointments and more. To sign up, go to [email or web address] . Click on \"Log in\" on the left side of the screen, which will take " "you to the Welcome page. Then click on \"Sign up Now\" on the right side of the page.     You will be asked to enter the access code listed below, as well as some personal information. Please follow the directions to create your username and password.     Your access code is: [email or web address]  Expires: 2018  2:36 PM     Your access code will  in 90 days. If you need help or a new code, please call your [place] clinic or [phone].        Care EveryWhere ID     This is your Care EveryWhere ID. This could be used by other organizations to access your [place] medical records  [id number]        Your Vitals Were     Pulse Temperature Height Pulse Oximetry BMI (Body Mass Index)       66 98.1  F (36.7  C) (Tympanic) 5' 3\" (1.6 m) 98% 27.99 kg/m2        Blood Pressure from Last 3 Encounters:   17 105/64   09/15/17 108/70   17 130/74    Weight from Last 3 Encounters:   17 158 lb (71.7 kg)   09/15/17 158 lb 4.8 oz (71.8 kg)   17 159 lb (72.1 kg)              Today, you had the following     No orders found for display       Primary Care Provider Office Phone # Fax #    Blanco Wang [phone]       [facility] [address]  [facility] [address]        Equal Access to Services     [facility] HospitalCONOR : Hadii daniel ku hadasho Soomaali, waaxda luqadaha, qaybta kaalmada adeegyagloria, karo zarate . So [facility] [phone].    ATENCIÓN: Si habla cynthia, tiene a curiel disposición servicios gratuitos de asistencia lingüística. Llame al [phone].    We comply with applicable federal civil rights laws and [place] laws. We do not discriminate on the basis of race, color, national origin, age, disability, sex, sexual orientation, or gender identity.            Thank you!     Thank you for choosing [facility]  for your care. Our goal is always to provide you with excellent care. Hearing back from our patients is one way we can continue " to improve our services. Please take a few minutes to complete the written survey that you may receive in the mail after your visit with us. Thank you!             Your Updated Medication List - Protect others around you: Learn how to safely use, store and throw away your medicines at www.disposemymeds.org.          This list is accurate as of: 11/1/17  2:36 PM.  Always use your most recent med list.                   Brand Name Dispense Instructions for use Diagnosis    albuterol 108 (90 BASE) MCG/ACT Inhaler    PROAIR HFA/PROVENTIL HFA/VENTOLIN HFA    1 Inhaler    Inhale 2 puffs into the lungs every 6 hours as needed for shortness of breath / dyspnea or wheezing    Moderate persistent asthma without complication       ASMANEX 120 METERED DOSES 220 MCG/INH Inhaler   Generic drug:  mometasone     1 Inhaler    Inhale 2 puffs into the lungs daily as needed    Moderate persistent asthma without complication       C-500 500 MG Chew   Generic drug:  Ascorbic Acid      Take 500 mg by mouth daily        MULTI-VITAMINS Tabs      Take 1 tablet by mouth        Vitamin-B Complex Tabs      Take 1 tablet by mouth daily

## 2017-11-30 ENCOUNTER — OFFICE VISIT (OUTPATIENT)
Dept: FAMILY MEDICINE | Facility: CLINIC | Age: 62
End: 2017-11-30
Payer: COMMERCIAL

## 2017-11-30 VITALS
BODY MASS INDEX: 27.93 KG/M2 | HEIGHT: 63 IN | HEART RATE: 60 BPM | WEIGHT: 157.6 LBS | TEMPERATURE: 97.5 F | SYSTOLIC BLOOD PRESSURE: 124 MMHG | OXYGEN SATURATION: 97 % | DIASTOLIC BLOOD PRESSURE: 70 MMHG

## 2017-11-30 DIAGNOSIS — R53.83 OTHER FATIGUE: ICD-10-CM

## 2017-11-30 DIAGNOSIS — R06.02 SHORTNESS OF BREATH: ICD-10-CM

## 2017-11-30 DIAGNOSIS — E78.5 HYPERLIPIDEMIA LDL GOAL <130: ICD-10-CM

## 2017-11-30 DIAGNOSIS — J45.40 MODERATE PERSISTENT ASTHMA WITHOUT COMPLICATION: Primary | ICD-10-CM

## 2017-11-30 DIAGNOSIS — R41.840 DIFFICULTY CONCENTRATING: ICD-10-CM

## 2017-11-30 LAB
ALBUMIN SERPL-MCNC: 4.2 G/DL (ref 3.4–5)
ALP SERPL-CCNC: 70 U/L (ref 40–150)
ALT SERPL W P-5'-P-CCNC: 18 U/L (ref 0–50)
ANION GAP SERPL CALCULATED.3IONS-SCNC: 11 MMOL/L (ref 3–14)
AST SERPL W P-5'-P-CCNC: 16 U/L (ref 0–45)
BILIRUB SERPL-MCNC: 0.7 MG/DL (ref 0.2–1.3)
BUN SERPL-MCNC: 15 MG/DL (ref 7–30)
CALCIUM SERPL-MCNC: 9.7 MG/DL (ref 8.5–10.1)
CHLORIDE SERPL-SCNC: 105 MMOL/L (ref 94–109)
CHOLEST SERPL-MCNC: 288 MG/DL
CO2 SERPL-SCNC: 25 MMOL/L (ref 20–32)
CREAT SERPL-MCNC: 0.67 MG/DL (ref 0.52–1.04)
ERYTHROCYTE [DISTWIDTH] IN BLOOD BY AUTOMATED COUNT: 13.5 % (ref 10–15)
FOLATE SERPL-MCNC: 13.1 NG/ML
GFR SERPL CREATININE-BSD FRML MDRD: 88 ML/MIN/1.7M2
GLUCOSE SERPL-MCNC: 92 MG/DL (ref 70–99)
HCT VFR BLD AUTO: 40.7 % (ref 35–47)
HDLC SERPL-MCNC: 71 MG/DL
HGB BLD-MCNC: 13.4 G/DL (ref 11.7–15.7)
LDLC SERPL CALC-MCNC: 178 MG/DL
MCH RBC QN AUTO: 29.5 PG (ref 26.5–33)
MCHC RBC AUTO-ENTMCNC: 32.9 G/DL (ref 31.5–36.5)
MCV RBC AUTO: 90 FL (ref 78–100)
NONHDLC SERPL-MCNC: 217 MG/DL
PLATELET # BLD AUTO: 286 10E9/L (ref 150–450)
POTASSIUM SERPL-SCNC: 4.1 MMOL/L (ref 3.4–5.3)
PROT SERPL-MCNC: 7.7 G/DL (ref 6.8–8.8)
RBC # BLD AUTO: 4.54 10E12/L (ref 3.8–5.2)
SODIUM SERPL-SCNC: 141 MMOL/L (ref 133–144)
TRIGL SERPL-MCNC: 195 MG/DL
TSH SERPL DL<=0.005 MIU/L-ACNC: 0.68 MU/L (ref 0.4–4)
VIT B12 SERPL-MCNC: 594 PG/ML (ref 193–986)
WBC # BLD AUTO: 7.1 10E9/L (ref 4–11)

## 2017-11-30 PROCEDURE — 80053 COMPREHEN METABOLIC PANEL: CPT | Performed by: INTERNAL MEDICINE

## 2017-11-30 PROCEDURE — 80061 LIPID PANEL: CPT | Performed by: INTERNAL MEDICINE

## 2017-11-30 PROCEDURE — 84443 ASSAY THYROID STIM HORMONE: CPT | Performed by: INTERNAL MEDICINE

## 2017-11-30 PROCEDURE — 36415 COLL VENOUS BLD VENIPUNCTURE: CPT | Performed by: INTERNAL MEDICINE

## 2017-11-30 PROCEDURE — 99214 OFFICE O/P EST MOD 30 MIN: CPT | Performed by: INTERNAL MEDICINE

## 2017-11-30 PROCEDURE — 82746 ASSAY OF FOLIC ACID SERUM: CPT | Performed by: INTERNAL MEDICINE

## 2017-11-30 PROCEDURE — 82607 VITAMIN B-12: CPT | Performed by: INTERNAL MEDICINE

## 2017-11-30 PROCEDURE — 85027 COMPLETE CBC AUTOMATED: CPT | Performed by: INTERNAL MEDICINE

## 2017-11-30 RX ORDER — LEVALBUTEROL TARTRATE 45 UG/1
2 AEROSOL, METERED ORAL EVERY 8 HOURS PRN
Qty: 3 INHALER | Refills: 1 | Status: SHIPPED | OUTPATIENT
Start: 2017-11-30 | End: 2018-10-21

## 2017-11-30 NOTE — NURSING NOTE
"Chief Complaint   Patient presents with     Follow Up For       Initial /70 (BP Location: Right arm)  Pulse 60  Temp 97.5  F (36.4  C) (Oral)  Ht 5' 3\" (1.6 m)  Wt 157 lb 9.6 oz (71.5 kg)  SpO2 97%  Breastfeeding? No  BMI 27.92 kg/m2 Estimated body mass index is 27.92 kg/(m^2) as calculated from the following:    Height as of this encounter: 5' 3\" (1.6 m).    Weight as of this encounter: 157 lb 9.6 oz (71.5 kg).  Medication Reconciliation: complete     NAZIA Hancock      "

## 2017-11-30 NOTE — PROGRESS NOTES
"  SUBJECTIVE:   Izabella Concepcion is a 62 year old female who presents to clinic today for the following health issues:      Asthma Follow-Up    Was ACT completed today?    Yes    ACT Total Scores 11/30/2017   ACT TOTAL SCORE (Goal Greater than or Equal to 20) 10   In the past 12 months, how many times did you visit the emergency room for your asthma without being admitted to the hospital? 0   In the past 12 months, how many times were you hospitalized overnight because of your asthma? 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  She did have scans of her head after those injuries  She has only been able to work 4 hours per day because of the above symptoms  Her brother advised her to take albuterol for her symptoms and her symptoms improved, as such she concluded that her symptoms might be related to asthma  She had PFTs in 2012 that I can see in [place] showing mild obstructive lung disease  She tells me she had special PFTs at [place] showing elevated levels of nitric oxide in her lungs suggesting severe asthma  She was prescribed asmanex and albuterol for that   She believes that both of these drugs make it difficult for her to sleep, so she stopped taking them    She denies wheezing, cough, sputum production, fever, chills, dysuria, hematuria, blood in stools, diarrhea, rash, she did have vaginal spotting several weeks ago and has made an appointment with her GYN to " "evaluate this     Also of note her non-specific leg pains are improving with massage therapy that she is receiving at the [facility]     Problem list and histories reviewed & adjusted, as indicated.  Additional history: as documented    Patient Active Problem List   Diagnosis     Moderate persistent asthma without complication     Statin intolerance     Hyperlipidemia LDL goal <130     Past Surgical History:   Procedure Laterality Date     ORTHOPEDIC SURGERY Right     right arm fracture     TONSILLECTOMY         Social History   Substance Use Topics     Smoking status: Former Smoker     Packs/day: 2.00     Years: 6.00     Smokeless tobacco: Never Used     Alcohol use Yes      Comment: 5-6 drinks per month     Family History   Problem Relation Age of Onset     CEREBROVASCULAR DISEASE Mother      hemorrhagic stroke     Myocardial Infarction Father 29     DIABETES Father          Current Outpatient Prescriptions   Medication Sig Dispense Refill     levalbuterol (XOPENEX HFA) 45 MCG/ACT Inhaler Inhale 2 puffs into the lungs every 8 hours as needed for shortness of breath / dyspnea or wheezing 3 Inhaler 1     Ascorbic Acid (C-500) 500 MG CHEW Take 500 mg by mouth daily       B Complex Vitamins (VITAMIN-B COMPLEX) TABS Take 1 tablet by mouth daily       ASMANEX 120 METERED DOSES 220 MCG/INH Inhaler Inhale 2 puffs into the lungs daily as needed 1 Inhaler 11     Multiple Vitamin (MULTI-VITAMINS) TABS Take 1 tablet by mouth       Allergies   Allergen Reactions     Vigamox [Moxifloxacin] Swelling         Reviewed and updated as needed this visit by clinical staffTobacco  Allergies  Meds  Soc Hx      Reviewed and updated as needed this visit by Provider         ROS:  Constitutional, HEENT, cardiovascular, pulmonary, gi and gu systems are negative, except as otherwise noted.      OBJECTIVE:   /70 (BP Location: Right arm)  Pulse 60  Temp 97.5  F (36.4  C) (Oral)  Ht 5' 3\" (1.6 m)  Wt 157 lb 9.6 oz (71.5 kg)  SpO2 97%  " Breastfeeding? No  BMI 27.92 kg/m2  Body mass index is 27.92 kg/(m^2).  GENERAL: healthy, alert and no distress  EYES: Eyes grossly normal to inspection, PERRL and conjunctivae and sclerae normal  HENT: ear canals and TM's normal, nose and mouth without ulcers or lesions  NECK: no adenopathy, no asymmetry, masses, or scars and thyroid normal to palpation  RESP: lungs clear to auscultation - no rales, rhonchi or wheezes  CV: regular rate and rhythm, normal S1 S2, no S3 or S4, no murmur, click or rub, no peripheral edema and peripheral pulses strong  ABDOMEN: soft, nontender, no hepatosplenomegaly, no masses and bowel sounds normal  MS: no gross musculoskeletal defects noted, no edema  SKIN: no suspicious lesions or rashes  NEURO: Normal strength and tone, mentation intact and speech normal  PSYCH: She has a normal affect and mood, she is well groomed, she has some unusual notions about how asthma could affect her entire well being    Diagnostic Test Results:    Labs pending    ASSESSMENT/PLAN:       ICD-10-CM    1. Moderate persistent asthma without complication J45.40 levalbuterol (XOPENEX HFA) 45 MCG/ACT Inhaler   2. Hyperlipidemia LDL goal <130 E78.5 Lipid panel reflex to direct LDL Non-fasting   3. Other fatigue R53.83 TSH     Comprehensive metabolic panel     CBC with platelets   4. Shortness of breath R06.02    5. Difficulty concentrating R41.840 Vitamin B12     Folate     NEUROPSYCHOLOGY REFERRAL     I am not sure all of her symptoms can be attributed to asthma  She might need more help with asthma, but there are few objective findings to suggest that her asthma is decompensated   Check labs for other identifiable or treatable causes of her non specific symptoms  Her cognitive symptoms are interesting, and neuropsychology testing might be helpful to understand this better  She will return to see me in 3-4 weeks to reassess her asthma after albuterol to xopenex switch (hopefuly this helps her sleep better) she  was advised to restart asmanex       Total time 28 minutes mostly counseling and coordinating care     Blanco Wang MD  [facility]

## 2017-11-30 NOTE — LETTER
"[facility]  [address] [facility]  [address]  [place]  [address]  Tel: [phone]    December 4, 2017    Izabella CHARLIE Jamey  [address] [facility] [address]        Dear Ms. Concepcion,    The following letter pertains to your most recent diagnostic tests:    -Your vitamin B12 level abdominal folic acid levels are normal.     -TSH (thyroid stimulating hormone) level is normal which indicates normal circulating thyroid hormone levels.      -Your total cholesterol is 288 which is above your goal of total cholesterol less than 200.    -Your triglycerides are 195 which are above your goal of triglycerides less than 150.    -Your HDL or \"good cholesterol\" is 71 which is at your goal of HDL cholesterol greater than 50.    -Your LDL cholesterol or \"bad cholesterol\" is 178 which is above your goal of LDL cholesterol less than <160.  Your LDL goal is based on your risk factors for artery disease.     -Liver and gallbladder tests are normal for you. (ALT,AST, Alk phos, bilirubin), kidney function is normal for you (Creatinine, GFR), Sodium is normal, Potassium is normal for you, Calcium is normal for you, Glucose (blood sugar) is normal for you.      -Your complete blood counts including your hemoglobin returned normal for you.       Bottom line:  Your lab tests do not show any obvious reason for your lightheadedness, fatigue and concentration difficulties.      Your cholesterol is too high.  I don't think you need a medication or this but you should try to make some diet changes.  You can reduce your total and LDL cholesterol levels by eating less saturated fats.  This means you should eat less fried foods and meat.  If you eat meat, you should try to eat more chicken and fish and less beef or pork.  Also, you should increase dietary fiber intake by eating more fruits, vegetables and whole grains.  A diet high in fiber reduces total and LDL cholesterol levels. Reducing carbohydrates and fats in your diet, " losing weight and consuming less alcohol can improve your triglyceride levels.       Follow up:  Schedule the neuropsychological testing as soon as you can.  Return to see me after the testing to review results and take next steps.        The 10-year ASCVD risk score (Salvadormelo GRACIA Jr, et al., 2013) is: 5.2%    Values used to calculate the score:      Age: 62 years      Sex: Female      Is Non- : No      Diabetic: No      Tobacco smoker: No      Systolic Blood Pressure: 124 mmHg      Is BP treated: No      HDL Cholesterol: 71 mg/dL      Total Cholesterol: 288 mg/dL      Sincerely,    Blanco Wang MD/MSL      Enclosure: Lab Results  Results for orders placed or performed in visit on 11/30/17   TSH   Result Value Ref Range    TSH 0.68 0.40 - 4.00 mU/L   Lipid panel reflex to direct LDL Non-fasting   Result Value Ref Range    Cholesterol 288 (H) <200 mg/dL    Triglycerides 195 (H) <150 mg/dL    HDL Cholesterol 71 >49 mg/dL    LDL Cholesterol Calculated 178 (H) <100 mg/dL    Non HDL Cholesterol 217 (H) <130 mg/dL   Comprehensive metabolic panel   Result Value Ref Range    Sodium 141 133 - 144 mmol/L    Potassium 4.1 3.4 - 5.3 mmol/L    Chloride 105 94 - 109 mmol/L    Carbon Dioxide 25 20 - 32 mmol/L    Anion Gap 11 3 - 14 mmol/L    Glucose 92 70 - 99 mg/dL    Urea Nitrogen 15 7 - 30 mg/dL    Creatinine 0.67 0.52 - 1.04 mg/dL    GFR Estimate 88 >60 mL/min/1.7m2    GFR Estimate If Black >90 >60 mL/min/1.7m2    Calcium 9.7 8.5 - 10.1 mg/dL    Bilirubin Total 0.7 0.2 - 1.3 mg/dL    Albumin 4.2 3.4 - 5.0 g/dL    Protein Total 7.7 6.8 - 8.8 g/dL    Alkaline Phosphatase 70 40 - 150 U/L    ALT 18 0 - 50 U/L    AST 16 0 - 45 U/L   CBC with platelets   Result Value Ref Range    WBC 7.1 4.0 - 11.0 10e9/L    RBC Count 4.54 3.8 - 5.2 10e12/L    Hemoglobin 13.4 11.7 - 15.7 g/dL    Hematocrit 40.7 35.0 - 47.0 %    MCV 90 78 - 100 fl    MCH 29.5 26.5 - 33.0 pg    MCHC 32.9 31.5 - 36.5 g/dL    RDW 13.5 10.0 - 15.0 %     Platelet Count 286 150 - 450 10e9/L   Vitamin B12   Result Value Ref Range    Vitamin B12 594 193 - 986 pg/mL   Folate   Result Value Ref Range    Folate 13.1 >5.4 ng/mL

## 2017-11-30 NOTE — MR AVS SNAPSHOT
After Visit Summary   11/30/2017    Izabella Concepcion    MRN: [id number]           Patient Information     Date Of Birth          1955        Visit Information        Provider Department      11/30/2017 10:00 AM Blanco Wang MD [facility]        Today's Diagnoses     Moderate persistent asthma without complication    -  1    Hyperlipidemia LDL goal <130        Other fatigue        Shortness of breath        Difficulty concentrating           Follow-ups after your visit        Additional Services     NEUROPSYCHOLOGY REFERRAL       Your provider has referred you to:    [facility]: Adult Neuropsychology Clinic [facility] [phone]  [email or web address]    All scheduling is subject to the client's specific insurance plan & benefits, provider/location availability, and provider clinical specialities.  Please arrive 15 minutes early for your first appointment and bring your completed paperwork.    Please be aware that coverage of these services is subject to the terms and limitations of your health insurance plan.  Call member services at your health plan with any benefit or coverage questions.    Please bring the following to your appointment:  >>   Any x-rays, CTs or MRIs which have been performed.  Contact the facility where they were done to arrange for  prior to your scheduled appointment.  Any new CT, MRI or other procedures ordered by your specialist must be performed at a [facility] facility or coordinated by your clinic's referral office.    >>   List of current medications   >>   This referral request   >>   Any documents/labs given to you for this referral                  Follow-up notes from your care team     Return in about 4 weeks (around 12/28/2017).      Who to contact     If you have questions or need follow up information about today's clinic visit or your schedule please contact [facility] directly at  "[phone].  Normal or non-critical lab and imaging results will be communicated to you by On-Q-ityhart, letter or phone within 4 business days after the clinic has received the results. If you do not hear from us within 7 days, please contact the clinic through On-Q-ityhart or phone. If you have a critical or abnormal lab result, we will notify you by phone as soon as possible.  Submit refill requests through [employer] or call your pharmacy and they will forward the refill request to us. Please allow 3 business days for your refill to be completed.          Additional Information About Your Visit        On-Q-ityharC4 Imaging Information     [employer] lets you send messages to your doctor, view your test results, renew your prescriptions, schedule appointments and more. To sign up, go to [email or web address] . Click on \"Log in\" on the left side of the screen, which will take you to the Welcome page. Then click on \"Sign up Now\" on the right side of the page.     You will be asked to enter the access code listed below, as well as some personal information. Please follow the directions to create your username and password.     Your access code is: [email or web address]  Expires: 2018  1:36 PM     Your access code will  in 90 days. If you need help or a new code, please call your [facility] clinic or [phone].        Care EveryWhere ID     This is your Care EveryWhere ID. This could be used by other organizations to access your [facility] medical records  [id number]        Your Vitals Were     Pulse Temperature Height Pulse Oximetry Breastfeeding? BMI (Body Mass Index)    60 97.5  F (36.4  C) (Oral) 5' 3\" (1.6 m) 97% No 27.92 kg/m2       Blood Pressure from Last 3 Encounters:   17 124/70   17 105/64   09/15/17 108/70    Weight from Last 3 Encounters:   17 157 lb 9.6 oz (71.5 kg)   17 158 lb (71.7 kg)   09/15/17 158 lb 4.8 oz (71.8 kg)              We Performed the Following     CBC with platelets     Comprehensive " metabolic panel     Folate     Lipid panel reflex to direct LDL Non-fasting     NEUROPSYCHOLOGY REFERRAL     TSH     Vitamin B12          Today's Medication Changes          These changes are accurate as of: 11/30/17 10:47 AM.  If you have any questions, ask your nurse or doctor.               Start taking these medicines.        Dose/Directions    levalbuterol 45 MCG/ACT Inhaler   Commonly known as:  XOPENEX HFA   Used for:  Moderate persistent asthma without complication   Started by:  Blanco Wang MD        Dose:  2 puff   Inhale 2 puffs into the lungs every 8 hours as needed for shortness of breath / dyspnea or wheezing   Quantity:  3 Inhaler   Refills:  1         Stop taking these medicines if you haven't already. Please contact your care team if you have questions.     albuterol 108 (90 BASE) MCG/ACT Inhaler   Commonly known as:  PROAIR HFA/PROVENTIL HFA/VENTOLIN HFA   Stopped by:  Blanco Wang MD                Where to get your medicines      These medications were sent to [facility] [address]  [address], [facility] [address], [facility] [address]     Phone:  [phone]     levalbuterol 45 MCG/ACT Inhaler                Primary Care Provider Office Phone # Fax #    Blanco Wang [phone]       [facility] [place] [address]  [facility] [address]        Equal Access to Services     DIONNE MONTOYA AH: Hadii aad ku hadasho Soomaali, waaxda luqadaha, qaybta kaalmada adeegyada, karo villaseñor. So [facility] [phone].    ATENCIÓN: Si habla español, tiene a curiel disposición servicios gratuitos de asistencia lingüística. Alicia al [phone].    We comply with applicable federal civil rights laws and [place] laws. We do not discriminate on the basis of race, color, national origin, age, disability, sex, sexual orientation, or gender identity.            Thank you!     Thank you for choosing [facility]  for your care. Our goal is always to provide you with excellent care. Hearing back from our patients is one way we can continue to improve our services. Please take a few minutes to complete the written survey that you may receive in the mail after your visit with us. Thank you!             Your Updated Medication List - Protect others around you: Learn how to safely use, store and throw away your medicines at www.disposemymeds.org.          This list is accurate as of: 11/30/17 10:47 AM.  Always use your most recent med list.                   Brand Name Dispense Instructions for use Diagnosis    ASMANEX 120 METERED DOSES 220 MCG/INH Inhaler   Generic drug:  mometasone     1 Inhaler    Inhale 2 puffs into the lungs daily as needed    Moderate persistent asthma without complication       C-500 500 MG Chew   Generic drug:  Ascorbic Acid      Take 500 mg by mouth daily        levalbuterol 45 MCG/ACT Inhaler    XOPENEX HFA    3 Inhaler    Inhale 2 puffs into the lungs every 8 hours as needed for shortness of breath / dyspnea or wheezing    Moderate persistent asthma without complication       MULTI-VITAMINS Tabs      Take 1 tablet by mouth        Vitamin-B Complex Tabs      Take 1 tablet by mouth daily

## 2017-12-01 ASSESSMENT — ASTHMA QUESTIONNAIRES: ACT_TOTALSCORE: 10

## 2017-12-02 NOTE — PROGRESS NOTES
"The following letter pertains to your most recent diagnostic tests:    -Your vitamin B12 level abdominal folic acid levels are normal.     -TSH (thyroid stimulating hormone) level is normal which indicates normal circulating thyroid hormone levels.      -Your total cholesterol is 288 which is above your goal of total cholesterol less than 200.    -Your triglycerides are 195 which are above your goal of triglycerides less than 150.    -Your HDL or \"good cholesterol\" is 71 which is at your goal of HDL cholesterol greater than 50.    -Your LDL cholesterol or \"bad cholesterol\" is 178 which is above your goal of LDL cholesterol less than <160.  Your LDL goal is based on your risk factors for artery disease.     -Liver and gallbladder tests are normal for you. (ALT,AST, Alk phos, bilirubin), kidney function is normal for you (Creatinine, GFR), Sodium is normal, Potassium is normal for you, Calcium is normal for you, Glucose (blood sugar) is normal for you.      -Your complete blood counts including your hemoglobin returned normal for you.       Bottom line:  Your lab tests do not show any obvious reason for your lightheadedness, fatigue and concentration difficulties.      Your cholesterol is too high.  I don't think you need a medication or this but you should try to make some diet changes.  You can reduce your total and LDL cholesterol levels by eating less saturated fats.  This means you should eat less fried foods and meat.  If you eat meat, you should try to eat more chicken and fish and less beef or pork.  Also, you should increase dietary fiber intake by eating more fruits, vegetables and whole grains.  A diet high in fiber reduces total and LDL cholesterol levels. Reducing carbohydrates and fats in your diet, losing weight and consuming less alcohol can improve your triglyceride levels.       Follow up:  Schedule the neuropsychological testing as soon as you can.  Return to see me after the testing to review " results and take next steps.        Sincerely,    Dr. Wang    The 10-year ASCVD risk score (Salvadormelo GARCIA Jr, et al., 2013) is: 5.2%    Values used to calculate the score:      Age: 62 years      Sex: Female      Is Non- : No      Diabetic: No      Tobacco smoker: No      Systolic Blood Pressure: 124 mmHg      Is BP treated: No      HDL Cholesterol: 71 mg/dL      Total Cholesterol: 288 mg/dL

## 2017-12-14 ENCOUNTER — TRANSFERRED RECORDS (OUTPATIENT)
Dept: HEALTH INFORMATION MANAGEMENT | Facility: CLINIC | Age: 62
End: 2017-12-14

## 2018-01-02 ENCOUNTER — OFFICE VISIT (OUTPATIENT)
Dept: NEUROPSYCHOLOGY | Facility: CLINIC | Age: 63
End: 2018-01-02
Payer: COMMERCIAL

## 2018-01-02 DIAGNOSIS — F09 COGNITIVE DISORDER: Primary | ICD-10-CM

## 2018-01-02 DIAGNOSIS — F41.9 ANXIETY: ICD-10-CM

## 2018-01-02 NOTE — NURSING NOTE
The patient was seen for neuropsychological evaluation at the request of Dr. Blanco Wang for the purpose of diagnostic clarification and treatment planning.  2 hours of face to face testing were provided by this writer.  Please see Dr. Pam Lester's report for a full interpretation of the findings.

## 2018-01-02 NOTE — MR AVS SNAPSHOT
After Visit Summary   1/2/2018    Izabella Concepcion    MRN: [id number]           Patient Information     Date Of Birth          1955        Visit Information        Provider Department      1/2/2018 8:30 AM Pam Lester PsyD [facility]        Today's Diagnoses     Cognitive disorder    -  1    Anxiety           Follow-ups after your visit        Your next 10 appointments already scheduled     Jan 19, 2018 10:30 AM CST   [facility]    [address] [facility] [address] [phone]           Bring a current list of meds and any records pertaining to this visit. For Physicals, please bring immunization records and any forms needing to be filled out. Please arrive 10 minutes early to complete paperwork.              Who to contact     Please call your clinic at [phone] to:    Ask questions about your health    Make or cancel appointments    Discuss your medicines    Learn about your test results    Speak to your doctor   If you have compliments or concerns about an experience at your clinic, or if you wish to file a complaint, please contact [facility] Patient Relations at [phone] or email us at [email or web address]         Additional Information About Your Visit        MyChart Information     [employer] gives you secure access to your electronic health record. If you see a primary care provider, you can also send messages to your care team and make appointments. If you have questions, please call your primary care clinic.  If you do not have a primary care provider, please call [phone] and they will assist you.      [employer] is an electronic gateway that provides easy, online access to your medical records. With Oris[employer], you can request a clinic appointment, read your test results, renew a prescription or communicate with your care team.     To  access your existing account, please contact your [facility] or call [phone] for assistance.        Care EveryWhere ID     This is your Care EveryWhere ID. This could be used by other organizations to access your [facility] medical records  [id number]         Blood Pressure from Last 3 Encounters:   11/30/17 124/70   11/01/17 105/64   09/15/17 108/70    Weight from Last 3 Encounters:   11/30/17 71.5 kg (157 lb 9.6 oz)   11/01/17 71.7 kg (158 lb)   09/15/17 71.8 kg (158 lb 4.8 oz)              We Performed the Following     [id number] TESTING, PER HR/PSYCHOLOGIST     NEUROPSYCH TESTING BY [facility]        Primary Care Provider Office Phone # Fax #    Blanco Wang [phone]       [facility] [place] [address]  [place] [address]        Equal Access to Services     DIONNE MONTOYA : Hadii aad ku hadasho Soomaali, waaxda luqadaha, qaybta kaalmada adeegyada, waxay josefinain haydeepakn britta zarate . So [facility] [phone].    ATENCIÓN: Si habla español, tiene a curiel disposición servicios gratuitos de asistencia lingüística. Llame al [phone].    We comply with applicable federal civil rights laws and [place] laws. We do not discriminate on the basis of race, color, national origin, age, disability, sex, sexual orientation, or gender identity.            Thank you!     Thank you for choosing [facility]OPSY[facility]  for your care. Our goal is always to provide you with excellent care. Hearing back from our patients is one way we can continue to improve our services. Please take a few minutes to complete the written survey that you may receive in the mail after your visit with us. Thank you!             Your Updated Medication List - Protect others around you: Learn how to safely use, store and throw away your medicines at www.disposemymeds.org.          This list is accurate as of: 1/2/18 11:59 PM.  Always use your most recent med list.                    Brand Name Dispense Instructions for use Diagnosis    ASMANEX 120 METERED DOSES 220 MCG/INH Inhaler   Generic drug:  mometasone     1 Inhaler    Inhale 2 puffs into the lungs daily as needed    Moderate persistent asthma without complication       C-500 500 MG Chew   Generic drug:  Ascorbic Acid      Take 500 mg by mouth daily        levalbuterol 45 MCG/ACT Inhaler    XOPENEX HFA    3 Inhaler    Inhale 2 puffs into the lungs every 8 hours as needed for shortness of breath / dyspnea or wheezing    Moderate persistent asthma without complication       MULTI-VITAMINS Tabs      Take 1 tablet by mouth        Vitamin-B Complex Tabs      Take 1 tablet by mouth daily

## 2018-01-10 NOTE — PROGRESS NOTES
Neuropsychology Laboratory  [facility]  [address], [address] [facility] [address]  [place]  [address]  [phone]    NEUROPSYCHOLOGICAL EVALUATION      RELEVANT HISTORY AND REASON FOR REFERRAL:    Izabella Concepcion is a 62-year-old  white woman presenting to her primary care physician with reports of  foggy  mentation and lightheadedness.  She seemed concerned this might be a sequelae of old mild head traumas.  The medical workup has been unrevealing.  This neuropsychological evaluation is requested by her primary care physician, Dr. Blanco Wang, to assist with diagnostic clarification and treatment planning.      The second of five children, she was born in [place], and grew up in [place] and [place], reared by her parents.  She indicated they were poor growing up, both parents had less than a high school education and worked as bus drivers.  They eventually  when the patient was about 14-years-old, after that she lived with her mother.  She believes she had a high IQ, but was diagnosed with dyslexia in the third grade, struggled throughout grade school and high school, but did graduate.  She went on to earn a Bachelor s degree in psychology at the [employer] [facility], reportedly with a 3.7 grade point average, followed by a Master s degree in counseling and clinical psychology, at [employer], again making good grades.  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  She s now working on a very limited basis, seeing one or two cases a week, providing somewhat controversial eye movement therapy.  Her only marriage ended in divorce.  She has two grown daughters from that marriage.  She lives alone in  [place].    BEHAVIORAL OBSERVATIONS AND CLINICAL INTERVIEW FINDINGS:    Ms. Concepcion arrived early and alone, presenting as an older woman of average stature and build with short brown hair, neat in a grey fleece hoodie, knit headband decorated with a large pin, black leotards, and boots.  Mood was euthymic to mildly pensive, as she expressed concerns about the assorted symptoms.     About two months ago she started noticing,  I couldn t think.  My brain was shutting down.  I went into a fog.   She wondered aloud about possible hypoxia related to asthma or perhaps adverse environmental effects from a home chimney.  The symptoms improved, though she still doesn t feel as  crisp  as she normally would.  She also wondered if the cognitive changes could be due to an emotional  let down,  related to changes in her business activities, and irregular sleep.   Maybe I m just tired of working that hard.       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  Over the last year or so she s cut back and got an assistant, and she seems to be trying to back off from all those activities.      She told me about a couple of head injuries that occurred years ago.  In December, 2011, she slipped at a restaurant and hit her head.  There may have been a very momentary loss of consciousness.  When she opened her eyes everything was spinning.  She was traveling at the time, and managed to return to her hotel room  in a daze  but later took a cab to the emergency room, where she was checked and released.  She then realized she didn t quite remember where she was staying.   I lost my visual memories.   Later she noticed she couldn t read or organize contracts.  She had dinner with someone for a couple of hours and later couldn t picture their face.  Speech was slurred for a while.  She  thinks she ultimately recovered  80%  but noticed she was still slower than normal on computer game strategies.  Per Care Everywhere, she saw a neurologist who suspected a mild concussion with a good prognosis.  Head CT and brain MRI were normal.  In the summer of 2013 she was standing on an 18 inch platform, lost her balance, fell backwards and hit her head.  There was no loss of consciousness.  She went to the [facility] where a brain scan reportedly was negative and she was released, without experiencing any enduring sequelae.    Neurological history is otherwise mentionable for possible childhood generalized tonic-clonic seizures, occurring around age [age], possibly associated with hypoglycemia.  She was never put on anticonvulsants.  While in her 20s she would sometimes wake up at night shaking, with no altered loss of consciousness, and would get up and walk around the room.  She recalls having a neurological workup at the [facility] and being told that she had  more than normal  electrical activity in the back part of her brain, but was not considered epileptic.      Sometime between 2006 and 2010 she had an episode of ill-defined sickness, and her daughter took her to the emergency room, possibly at [facility].  MI was ruled out.  She doesn t recall the specifics of her treatment, but recollects she may have had a similar episode as a teenager, possibly attributed to abnormal blood sugars.    Surgeries include tonsillectomy, right wrist surgery, and repair of a back fracture using bone harvested from her hip.      There is a history of anxiety, which she thinks started during a bad marriage.  She participated in the aforementioned controversial eye movement desensitization therapy which she found very effective and also took psychotropics, an antianxiety agent and Celexa.  She also was on lorazepam prescribed by her primary care physician, but has not been on psychotropics in many years.  Aside  from marital therapy, she has not participated in any other mental health treatment.  She s not feeling especially anxious  now.  She thinks she was mentally traumatized and at one time had panic attacks and other functional difficulties associated with anxiety  almost a sense of paralysis, that why my brain shuts down.       Regarding habits, she smoked up to two packs-per-day as a teenager, but quit the habit long ago.  As a teenager she took speed in pill form and smoked marijuana, but denied use of any street drugs since age 17.  Alcohol use was never heavy or problematic and lately has been about one glass of wine a week.    The only medications currently taken are Asthmanex, level buteral, ascorbic acid, and B complex vitamins.      Family history is mentionable for both parents who were diabetic, her father who had heart disease and reportedly was an alcoholic, her mother was obese and had cerebrovascular disease, and was also treated for severe depression.  One brother reportedly has PTSD, from his [place] experiences, another brother is possibly alcoholic.    During testing she was cooperative and seemed to apply herself fully.  She was sufficiently alert and attentive and had no trouble understanding, retaining, or following directives.  Results are considered technically valid and an accurate reflection of current cognitive capabilities.    NEUROPSYCHOLOGICAL FINDINGS:    Select intellectual abilities were assessed with subtests from the Wechsler Adult Intelligence Scale-IV.  Overall intellectual functioning is estimated to be in the high average range.  Visuospatial processing and constructional abilities (Block Design), speeded graphomotor learning (Coding), and speeded visual attention (Symbol Search), were average.  Auditory attention span on a digit sequence learning exercise (Digit Span) was above average.  She could repeat up to eight digits in the forward direction and five in the reverse order.   Word knowledge and expressive communicability (Vocabulary) was superior.      Immediate memory for two story passages from the Wechsler Memory Scale-Revised was solidly average with 22 of 50 story elements recalled immediately after presentation.  Retention of the material 30 minutes later was below average, with inconsistent retention of the two stories (25% retention of the first story, 80% retention of the second one).  Word list learning (Lenny Auditory Verbal Learning Test) was average for learning over trials with 14 of the 15 words learned by the last trial (above average).  Recall of the list after a brief distractor exercise was superior, and recall 30 minutes later was above average.  Fourteen of the 15 words were correctly selected when presented among a list of foils with two intrusive errors.  Immediate memory for figural material (four designs from the WMS) was average.  Free recall 30 minutes later was below average, but with visual cueing, nearly all of the originally learned material was recalled, and all four figures were recognized when presented in multiple choice format.  Performance on a simple numerical sequencing exercise (Trail Making Test Part A), requiring sustained attention, was average for speed and error free.  Performance on a more complex sequencing exercise (Trail Making Test Part B), requiring divided attention (alternating between number and letter sequences) was high average for speed and error free.  Verbal associative fluency on the Controlled Oral Word Association Test (generating words beginning with target letters) was average to high average.  Nonverbal planning and foresight, as demonstrated on a maze solving exercise ([employer] [employer] Maze Test), were high average.      Finger tapping speeds were superior bilaterally, the right (dominant) hand faster than the left, as expected.  Fine motor speed and dexterity (Grooved Pegboard) was above average for the right hand and above average  if not superior for the left hand.  Confrontation naming on the [place] Naming Test was intact with 58 of 60 pictured items correctly, spontaneously named with no paraphasic errors or visual misperceptions.  Speeded word reading, color naming, and color naming involving suppression of a more familiar response (Stroop Color-Word Test) were average.  A variety of brief exercises requiring sustained attention and cognitive flexibility (Test of Sustained Attention and Tracking) were completed quickly with no errors.    CONCLUSIONS AND RECOMMENDATIONS:    This 62-year-old woman comes to medical attention with concerns about intermittent mental  fogginess  and lightheadedness.  She s concerned this could be due to residuals from old mild head traumas, low oxygen possibly related to asthma and a home chimney, mental exhaustion, or other factors.  There is a history of anxiety and panic attacks, as detailed above.      Testing does not provide consistent or compelling evidence of brain dysfunction of any kind.  Long-term retention of story passages was highly inconsistent, but at best within normal limits.  Word list learning was actually above average with very good short and long-term retention of the material.  Memory for figural material was also grossly intact.  Intellectual abilities are estimated to be in the above average range, consistent with her normal baseline.  There are no expressive or receptive language deficits.  Specifically, word retrieval is fast and there are no signs of word searching on a confrontation naming task.  Executive abilities such as divided attention and nonverbal planning are also intact, as are psychomotor speeds.  She s actually quite fast and accurate on tests requiring sustained attention, visual and horizontal scanning, etc.  I conclude her concerns are most likely of a functional (nonorganic) nature, primarily related to stress and anxiety.  I defer to Dr. Wang in determining  whether there are other noncerebral factors that might be contributing to her reported lightheadedness.      Saritha Shah.[email or web address].   Licensed Psychologist, [employer] [facility]  Diplomate in Clinical Neuropsychology, [facility]    The diagnostic impression for the purposes of this evaluation is Cognitive Disorder in the context of Anxiety.  This evaluation included approximately three hours of testing administered by a psychometrist with interpretation by a neuropsychologist (CPT 11447) and an additional three hours of professional time spent on the interview, data integration, record review, and report preparation (CPT 77262).    DDR: (MAYURI)

## 2018-01-10 NOTE — PROGRESS NOTES
WECHSLER ADULT INTELLIGENCE SCALE-IV CONTROLLED WORD ASSOCIATION TEST        Age-Scaled Score Score   48    Vocabulary          14        Digit Span          13      PORTEUS MAZE TEST   Block Design          11     Coding          11   Test Age          16.5       Symbol Search           11     Test Quotient        118    WECHSLER MEMORY SCALES TRAIL MAKING TEST       Logical Mem Immediate    12/10   Time Errors   Logical Mem 30 Minute   3/8   A   41             0    Visual Repr Immediate                  9     B   80             0    Visual Repr 30                   6           30 Minute Recognition                  4     PSYCHOMOTOR TESTS       TEST OF SUSTAINED ATTENTION & TRACKING  Right    Left    Finger Tapping   46      43      Total Time     76   Grooved Pegboard   60           57      Total Errors    0       Drops: 0          Drops: 0         LULÚ AUDITORY VERBAL LEARNING TEST BOSTON NAMING TEST         I   II III IV V           VI VII Score  58     7    8      9     11     14      6     13             30 Minute Recall  12      30 Minute Recognition  14     Intrusions    2         STROOP COLOR-WORD TEST                                T-Score    Word              52             Color              53            Color-Word           53

## 2018-01-19 ENCOUNTER — OFFICE VISIT (OUTPATIENT)
Dept: FAMILY MEDICINE | Facility: CLINIC | Age: 63
End: 2018-01-19
Payer: COMMERCIAL

## 2018-01-19 VITALS
TEMPERATURE: 97.1 F | OXYGEN SATURATION: 97 % | DIASTOLIC BLOOD PRESSURE: 70 MMHG | SYSTOLIC BLOOD PRESSURE: 109 MMHG | HEART RATE: 78 BPM | WEIGHT: 157 LBS | BODY MASS INDEX: 27.82 KG/M2 | HEIGHT: 63 IN

## 2018-01-19 DIAGNOSIS — G47.9 SLEEP DISTURBANCE: Primary | ICD-10-CM

## 2018-01-19 DIAGNOSIS — F43.9 STRESS: ICD-10-CM

## 2018-01-19 DIAGNOSIS — E78.5 HYPERLIPIDEMIA LDL GOAL <130: ICD-10-CM

## 2018-01-19 PROCEDURE — 99213 OFFICE O/P EST LOW 20 MIN: CPT | Performed by: INTERNAL MEDICINE

## 2018-01-19 NOTE — NURSING NOTE
"Chief Complaint   Patient presents with     Follow Up For     Neuropsych testing       Initial /70 (BP Location: Right arm, Cuff Size: Adult Regular)  Pulse 78  Temp 97.1  F (36.2  C) (Oral)  Ht 5' 3\" (1.6 m)  Wt 157 lb (71.2 kg)  SpO2 97%  BMI 27.81 kg/m2 Estimated body mass index is 27.81 kg/(m^2) as calculated from the following:    Height as of this encounter: 5' 3\" (1.6 m).    Weight as of this encounter: 157 lb (71.2 kg).  Medication Reconciliation: complete   Sabra Medrano MA  "

## 2018-01-19 NOTE — MR AVS SNAPSHOT
After Visit Summary   1/19/2018    Izaeblla Concepcion    MRN: [id number]           Patient Information     Date Of Birth          1955        Visit Information        Provider Department      1/19/2018 10:30 AM Blanco Wang MD [facility]        Today's Diagnoses     Sleep disturbance    -  1    Stress        Hyperlipidemia LDL goal <130           Follow-ups after your visit        Additional Services     MENTAL HEALTH REFERRAL  - Adult; Outpatient Treatment; Individual/Couples/Family/Group Therapy/Health Psychology; [facility]: [facility] [phone]; We will contact you to schedule the appointment or please call with any questions       All scheduling is subject to the client's specific insurance plan & benefits, provider/location availability, and provider clinical specialities.  Please arrive 15 minutes early for your first appointment and bring your completed paperwork.    Please be aware that coverage of these services is subject to the terms and limitations of your health insurance plan.  Call member services at your health plan with any benefit or coverage questions.                            Who to contact     If you have questions or need follow up information about today's clinic visit or your schedule please contact [facility] directly at [phone].  Normal or non-critical lab and imaging results will be communicated to you by MyChart, letter or phone within 4 business days after the clinic has received the results. If you do not hear from us within 7 days, please contact the clinic through MyChart or phone. If you have a critical or abnormal lab result, we will notify you by phone as soon as possible.  Submit refill requests through [employer] or call your pharmacy and they will forward the refill request to us. Please allow 3 business days for your refill to be completed.          Additional Information About Your Visit        MyChart  "Information     Judy gives you secure access to your electronic health record. If you see a primary care provider, you can also send messages to your care team and make appointments. If you have questions, please call your primary care clinic.  If you do not have a primary care provider, please call [phone] and they will assist you.        Care EveryWhere ID     This is your Care EveryWhere ID. This could be used by other organizations to access your [place] medical records  [id number]        Your Vitals Were     Pulse Temperature Height Pulse Oximetry BMI (Body Mass Index)       78 97.1  F (36.2  C) (Oral) 5' 3\" (1.6 m) 97% 27.81 kg/m2        Blood Pressure from Last 3 Encounters:   01/19/18 109/70   11/30/17 124/70   11/01/17 105/64    Weight from Last 3 Encounters:   01/19/18 157 lb (71.2 kg)   11/30/17 157 lb 9.6 oz (71.5 kg)   11/01/17 158 lb (71.7 kg)              We Performed the Following     MENTAL HEALTH REFERRAL  - Adult; Outpatient Treatment; Individual/Couples/Family/Group Therapy/Health Psychology; [facility]: [facility] [phone]; We will contact you to schedule the appointment or please call with any questions        Primary Care Provider Office Phone # Fax #    Blanco Wang [phone]       [facility] [address] [place] [address] [address]  [facility] [address]        Equal Access to Services     DIONNE MONTOYA : Hadii aad ku hadasho Soabimael, waaxda luqadaha, qaybta kaalmada karo bernabe. So [facility] [phone].    ATENCIÓN: Si habla español, tiene a curiel disposición servicios gratuitos de asistencia lingüística. Llame al [phone].    We comply with applicable federal civil rights laws and [place] laws. We do not discriminate on the basis of race, color, national origin, age, disability, sex, sexual orientation, or gender identity.            Thank you!     Thank you for choosing [facility]  for your care. " Our goal is always to provide you with excellent care. Hearing back from our patients is one way we can continue to improve our services. Please take a few minutes to complete the written survey that you may receive in the mail after your visit with us. Thank you!             Your Updated Medication List - Protect others around you: Learn how to safely use, store and throw away your medicines at www.disposemymeds.org.          This list is accurate as of: 1/19/18 11:13 AM.  Always use your most recent med list.                   Brand Name Dispense Instructions for use Diagnosis    ASMANEX 120 METERED DOSES 220 MCG/INH Inhaler   Generic drug:  mometasone     1 Inhaler    Inhale 2 puffs into the lungs daily as needed    Moderate persistent asthma without complication       C-500 500 MG Chew   Generic drug:  Ascorbic Acid      Take 500 mg by mouth daily        levalbuterol 45 MCG/ACT Inhaler    XOPENEX HFA    3 Inhaler    Inhale 2 puffs into the lungs every 8 hours as needed for shortness of breath / dyspnea or wheezing    Moderate persistent asthma without complication       MULTI-VITAMINS Tabs      Take 1 tablet by mouth        Vitamin-B Complex Tabs      Take 1 tablet by mouth daily

## 2018-01-19 NOTE — PROGRESS NOTES
SUBJECTIVE:   Izabella Concepcion is a 62 year old female who presents to clinic today for the following health issues:        F/up neuropsych testing 1-2-2018    Neuropsychological testing results reviewed with patient  Cognitive symptoms have improved  She wonders if she inhaled too much smoke from her fireplace?  She asks for help balancing her work life and health  Admits to excessive responsibility at work, but denies anxiety related to this  Has trouble falling asleep and staying asleep, moderate present for months  Sleep test at [place] normal by her report           Problem list and histories reviewed & adjusted, as indicated.  Additional history: as documented    Patient Active Problem List   Diagnosis     Moderate persistent asthma without complication     Statin intolerance     Hyperlipidemia LDL goal <130     Past Surgical History:   Procedure Laterality Date     ORTHOPEDIC SURGERY Right     right arm fracture     TONSILLECTOMY         Social History   Substance Use Topics     Smoking status: Former Smoker     Packs/day: 2.00     Years: 6.00     Smokeless tobacco: Never Used     Alcohol use Yes      Comment: 5-6 drinks per month     Family History   Problem Relation Age of Onset     CEREBROVASCULAR DISEASE Mother      hemorrhagic stroke     Myocardial Infarction Father 29     DIABETES Father          Current Outpatient Prescriptions   Medication Sig Dispense Refill     levalbuterol (XOPENEX HFA) 45 MCG/ACT Inhaler Inhale 2 puffs into the lungs every 8 hours as needed for shortness of breath / dyspnea or wheezing 3 Inhaler 1     Ascorbic Acid (C-500) 500 MG CHEW Take 500 mg by mouth daily       B Complex Vitamins (VITAMIN-B COMPLEX) TABS Take 1 tablet by mouth daily       ASMANEX 120 METERED DOSES 220 MCG/INH Inhaler Inhale 2 puffs into the lungs daily as needed 1 Inhaler 11     Multiple Vitamin (MULTI-VITAMINS) TABS Take 1 tablet by mouth       Allergies   Allergen Reactions     Vigamox [Moxifloxacin]  "Swelling         Reviewed and updated as needed this visit by clinical staff       Reviewed and updated as needed this visit by Provider         ROS:  Constitutional, HEENT, cardiovascular, pulmonary, gi and gu systems are negative, except as otherwise noted.      OBJECTIVE:   /70 (BP Location: Right arm, Cuff Size: Adult Regular)  Pulse 78  Temp 97.1  F (36.2  C) (Oral)  Ht 5' 3\" (1.6 m)  Wt 157 lb (71.2 kg)  SpO2 97%  BMI 27.81 kg/m2  Body mass index is 27.81 kg/(m^2).  GENERAL: healthy, alert and no distress  NEURO: Normal strength and tone, mentation intact and speech normal  PSYCH: Mildly anxious affect, normal mood, well groomed, reasonable insight that is improving, normal speech    Diagnostic Test Results:  none     ASSESSMENT/PLAN:         ICD-10-CM    1. Sleep disturbance G47.9 MENTAL HEALTH REFERRAL  - Adult; Outpatient Treatment; Individual/Couples/Family/Group Therapy/Health Psychology; [facility]: [facility] [phone]; We will contact you to schedule the appointment or please call with any questions   2. Stress F43.9 MENTAL HEALTH REFERRAL  - Adult; Outpatient Treatment; Individual/Couples/Family/Group Therapy/Health Psychology; [facility]: [facility] [phone]; We will contact you to schedule the appointment or please call with any questions   3. Hyperlipidemia LDL goal <130 E78.5        I think CBT from psychologist would help sleep most  Also, counseling may help with anxiety/stress which neuropsychological test pointed toward as cause of mental symptoms reported.  Could get help with works/personal boundaries from counselor as well    I recommended a  to help with weight loss goals  Counseled on diet for cholesterol management    Recommended CO alarm for home due to concern for smoke related cognitive issues     Total time > 20 minutes almost all counseling       Return next November with fasting labs or sooner as symptoms dictate     Blanco " KAILYN Wang MD  [facility]

## 2018-08-28 ENCOUNTER — TRANSFERRED RECORDS (OUTPATIENT)
Dept: HEALTH INFORMATION MANAGEMENT | Facility: CLINIC | Age: 63
End: 2018-08-28

## 2018-09-11 ENCOUNTER — HOSPITAL ENCOUNTER (OUTPATIENT)
Dept: MAMMOGRAPHY | Facility: CLINIC | Age: 63
Discharge: HOME | End: 2018-09-11
Attending: OBSTETRICS & GYNECOLOGY | Admitting: OBSTETRICS & GYNECOLOGY
Payer: COMMERCIAL

## 2018-09-11 DIAGNOSIS — Z12.31 VISIT FOR SCREENING MAMMOGRAM: ICD-10-CM

## 2018-09-11 PROCEDURE — 77067 SCR MAMMO BI INCL CAD: CPT

## 2018-10-21 ENCOUNTER — APPOINTMENT (OUTPATIENT)
Dept: GENERAL RADIOLOGY | Facility: CLINIC | Age: 63
End: 2018-10-21
Attending: EMERGENCY MEDICINE
Payer: COMMERCIAL

## 2018-10-21 ENCOUNTER — HOSPITAL ENCOUNTER (INPATIENT)
Facility: CLINIC | Age: 63
LOS: 3 days | Discharge: HOME HEALTH | End: 2018-10-24
Attending: EMERGENCY MEDICINE | Admitting: INTERNAL MEDICINE
Payer: COMMERCIAL

## 2018-10-21 ENCOUNTER — MEDICAL CORRESPONDENCE (OUTPATIENT)
Dept: HEALTH INFORMATION MANAGEMENT | Facility: CLINIC | Age: 63
End: 2018-10-21

## 2018-10-21 ENCOUNTER — APPOINTMENT (OUTPATIENT)
Dept: GENERAL RADIOLOGY | Facility: CLINIC | Age: 63
End: 2018-10-21
Attending: ORTHOPAEDIC SURGERY
Payer: COMMERCIAL

## 2018-10-21 ENCOUNTER — ANESTHESIA (OUTPATIENT)
Dept: SURGERY | Facility: CLINIC | Age: 63
End: 2018-10-21
Payer: COMMERCIAL

## 2018-10-21 ENCOUNTER — ANESTHESIA EVENT (OUTPATIENT)
Dept: SURGERY | Facility: CLINIC | Age: 63
End: 2018-10-21
Payer: COMMERCIAL
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
## 2018-10-21 DIAGNOSIS — S72.001A CLOSED FRACTURE OF RIGHT HIP, INITIAL ENCOUNTER (H): ICD-10-CM

## 2018-10-21 PROBLEM — S72.91XA FEMUR FRACTURE, RIGHT (H): Status: ACTIVE | Noted: 2018-10-21

## 2018-10-21 LAB
ALBUMIN SERPL-MCNC: 3.5 G/DL (ref 3.4–5)
ALP SERPL-CCNC: 63 U/L (ref 40–150)
ALT SERPL W P-5'-P-CCNC: 17 U/L (ref 0–50)
ANION GAP SERPL CALCULATED.3IONS-SCNC: 8 MMOL/L (ref 3–14)
APTT PPP: 27 SEC (ref 22–37)
AST SERPL W P-5'-P-CCNC: 21 U/L (ref 0–45)
BASOPHILS # BLD AUTO: 0 10E9/L (ref 0–0.2)
BASOPHILS NFR BLD AUTO: 0.4 %
BILIRUB SERPL-MCNC: 0.3 MG/DL (ref 0.2–1.3)
BUN SERPL-MCNC: 11 MG/DL (ref 7–30)
CALCIUM SERPL-MCNC: 8.1 MG/DL (ref 8.5–10.1)
CHLORIDE SERPL-SCNC: 108 MMOL/L (ref 94–109)
CO2 SERPL-SCNC: 25 MMOL/L (ref 20–32)
CREAT SERPL-MCNC: 0.58 MG/DL (ref 0.52–1.04)
DIFFERENTIAL METHOD BLD: NORMAL
EOSINOPHIL # BLD AUTO: 0.2 10E9/L (ref 0–0.7)
EOSINOPHIL NFR BLD AUTO: 2.4 %
ERYTHROCYTE [DISTWIDTH] IN BLOOD BY AUTOMATED COUNT: 13.2 % (ref 10–15)
GFR SERPL CREATININE-BSD FRML MDRD: >90 ML/MIN/1.7M2
GLUCOSE SERPL-MCNC: 117 MG/DL (ref 70–99)
HCT VFR BLD AUTO: 37.6 % (ref 35–47)
HGB BLD-MCNC: 12.7 G/DL (ref 11.7–15.7)
IMM GRANULOCYTES # BLD: 0 10E9/L (ref 0–0.4)
IMM GRANULOCYTES NFR BLD: 0.4 %
INR PPP: 0.96 (ref 0.86–1.14)
INTERPRETATION ECG - MUSE: NORMAL
LYMPHOCYTES # BLD AUTO: 2 10E9/L (ref 0.8–5.3)
LYMPHOCYTES NFR BLD AUTO: 24.4 %
MCH RBC QN AUTO: 29.5 PG (ref 26.5–33)
MCHC RBC AUTO-ENTMCNC: 33.8 G/DL (ref 31.5–36.5)
MCV RBC AUTO: 87 FL (ref 78–100)
MONOCYTES # BLD AUTO: 0.7 10E9/L (ref 0–1.3)
MONOCYTES NFR BLD AUTO: 8.3 %
NEUTROPHILS # BLD AUTO: 5.2 10E9/L (ref 1.6–8.3)
NEUTROPHILS NFR BLD AUTO: 64.1 %
NRBC # BLD AUTO: 0 10*3/UL
NRBC BLD AUTO-RTO: 0 /100
PLATELET # BLD AUTO: 241 10E9/L (ref 150–450)
POTASSIUM SERPL-SCNC: 3.8 MMOL/L (ref 3.4–5.3)
PROT SERPL-MCNC: 6.6 G/DL (ref 6.8–8.8)
RBC # BLD AUTO: 4.3 10E12/L (ref 3.8–5.2)
SODIUM SERPL-SCNC: 141 MMOL/L (ref 133–144)
WBC # BLD AUTO: 8.1 10E9/L (ref 4–11)

## 2018-10-21 PROCEDURE — 25000125 ZZHC RX 250: Performed by: ORTHOPAEDIC SURGERY

## 2018-10-21 PROCEDURE — 40000169 ZZH STATISTIC PRE-PROCEDURE ASSESSMENT I: Performed by: ORTHOPAEDIC SURGERY

## 2018-10-21 PROCEDURE — 85610 PROTHROMBIN TIME: CPT | Performed by: EMERGENCY MEDICINE

## 2018-10-21 PROCEDURE — 25000125 ZZHC RX 250: Performed by: NURSE ANESTHETIST, CERTIFIED REGISTERED

## 2018-10-21 PROCEDURE — 25000566 ZZH SEVOFLURANE, EA 15 MIN: Performed by: ORTHOPAEDIC SURGERY

## 2018-10-21 PROCEDURE — C1713 ANCHOR/SCREW BN/BN,TIS/BN: HCPCS | Performed by: ORTHOPAEDIC SURGERY

## 2018-10-21 PROCEDURE — 86922 COMPATIBILITY TEST ANTIGLOB: CPT | Performed by: EMERGENCY MEDICINE

## 2018-10-21 PROCEDURE — 73630 X-RAY EXAM OF FOOT: CPT | Mod: RT

## 2018-10-21 PROCEDURE — C1776 JOINT DEVICE (IMPLANTABLE): HCPCS | Performed by: ORTHOPAEDIC SURGERY

## 2018-10-21 PROCEDURE — 25800025 ZZH RX 258: Performed by: ORTHOPAEDIC SURGERY

## 2018-10-21 PROCEDURE — 37000008 ZZH ANESTHESIA TECHNICAL FEE, 1ST 30 MIN: Performed by: ORTHOPAEDIC SURGERY

## 2018-10-21 PROCEDURE — 36000063 ZZH SURGERY LEVEL 4 EA 15 ADDTL MIN: Performed by: ORTHOPAEDIC SURGERY

## 2018-10-21 PROCEDURE — 12000007 ZZH R&B INTERMEDIATE

## 2018-10-21 PROCEDURE — 93005 ELECTROCARDIOGRAM TRACING: CPT

## 2018-10-21 PROCEDURE — 0SR906A REPLACEMENT OF RIGHT HIP JOINT WITH OXIDIZED ZIRCONIUM ON POLYETHYLENE SYNTHETIC SUBSTITUTE, UNCEMENTED, OPEN APPROACH: ICD-10-PCS | Performed by: ORTHOPAEDIC SURGERY

## 2018-10-21 PROCEDURE — 25000128 H RX IP 250 OP 636: Performed by: ORTHOPAEDIC SURGERY

## 2018-10-21 PROCEDURE — 86901 BLOOD TYPING SEROLOGIC RH(D): CPT | Performed by: EMERGENCY MEDICINE

## 2018-10-21 PROCEDURE — 86870 RBC ANTIBODY IDENTIFICATION: CPT | Performed by: EMERGENCY MEDICINE

## 2018-10-21 PROCEDURE — 37000009 ZZH ANESTHESIA TECHNICAL FEE, EACH ADDTL 15 MIN: Performed by: ORTHOPAEDIC SURGERY

## 2018-10-21 PROCEDURE — 36415 COLL VENOUS BLD VENIPUNCTURE: CPT

## 2018-10-21 PROCEDURE — 85730 THROMBOPLASTIN TIME PARTIAL: CPT | Performed by: EMERGENCY MEDICINE

## 2018-10-21 PROCEDURE — 71045 X-RAY EXAM CHEST 1 VIEW: CPT

## 2018-10-21 PROCEDURE — 80053 COMPREHEN METABOLIC PANEL: CPT | Performed by: EMERGENCY MEDICINE

## 2018-10-21 PROCEDURE — 73552 X-RAY EXAM OF FEMUR 2/>: CPT | Mod: RT

## 2018-10-21 PROCEDURE — 25000128 H RX IP 250 OP 636: Performed by: INTERNAL MEDICINE

## 2018-10-21 PROCEDURE — 25000128 H RX IP 250 OP 636: Performed by: NURSE ANESTHETIST, CERTIFIED REGISTERED

## 2018-10-21 PROCEDURE — 86900 BLOOD TYPING SEROLOGIC ABO: CPT | Performed by: EMERGENCY MEDICINE

## 2018-10-21 PROCEDURE — 99285 EMERGENCY DEPT VISIT HI MDM: CPT

## 2018-10-21 PROCEDURE — 85025 COMPLETE CBC W/AUTO DIFF WBC: CPT | Performed by: EMERGENCY MEDICINE

## 2018-10-21 PROCEDURE — 27210794 ZZH OR GENERAL SUPPLY STERILE: Performed by: ORTHOPAEDIC SURGERY

## 2018-10-21 PROCEDURE — 99222 1ST HOSP IP/OBS MODERATE 55: CPT | Mod: AI | Performed by: INTERNAL MEDICINE

## 2018-10-21 PROCEDURE — 72170 X-RAY EXAM OF PELVIS: CPT

## 2018-10-21 PROCEDURE — 86850 RBC ANTIBODY SCREEN: CPT | Performed by: EMERGENCY MEDICINE

## 2018-10-21 PROCEDURE — 40000986 XR PELVIS AD HIP PORTABLE RIGHT 1 VIEW

## 2018-10-21 PROCEDURE — 36000093 ZZH SURGERY LEVEL 4 1ST 30 MIN: Performed by: ORTHOPAEDIC SURGERY

## 2018-10-21 PROCEDURE — 71000012 ZZH RECOVERY PHASE 1 LEVEL 1 FIRST HR: Performed by: ORTHOPAEDIC SURGERY

## 2018-10-21 DEVICE — IMP HEAD FEMORAL SNN 12/14 OXIN 36MM +4MM 71343604: Type: IMPLANTABLE DEVICE | Site: HIP | Status: FUNCTIONAL

## 2018-10-21 DEVICE — IMP SHELL SNR ACET R3 3H 54MM 71335554: Type: IMPLANTABLE DEVICE | Site: HIP | Status: FUNCTIONAL

## 2018-10-21 DEVICE — IMP SCR ACET SNN SPHERICAL HEAD 6.5X30MM 71332530: Type: IMPLANTABLE DEVICE | Site: HIP | Status: FUNCTIONAL

## 2018-10-21 DEVICE — IMP SCR ACET SNN SPHERICAL HEAD 6.5X25MM 71332525: Type: IMPLANTABLE DEVICE | Site: HIP | Status: FUNCTIONAL

## 2018-10-21 DEVICE — IMPLANTABLE DEVICE: Type: IMPLANTABLE DEVICE | Site: HIP | Status: FUNCTIONAL

## 2018-10-21 DEVICE — IMP STEM SNN STD OFFSET SZ 6 71356006: Type: IMPLANTABLE DEVICE | Site: HIP | Status: FUNCTIONAL

## 2018-10-21 RX ORDER — POTASSIUM CL/LIDO/0.9 % NACL 10MEQ/0.1L
10 INTRAVENOUS SOLUTION, PIGGYBACK (ML) INTRAVENOUS
Status: DISCONTINUED | OUTPATIENT
Start: 2018-10-21 | End: 2018-10-24 | Stop reason: HOSPADM

## 2018-10-21 RX ORDER — CEFAZOLIN SODIUM 2 G/100ML
2 INJECTION, SOLUTION INTRAVENOUS
Status: COMPLETED | OUTPATIENT
Start: 2018-10-21 | End: 2018-10-21

## 2018-10-21 RX ORDER — ALBUTEROL SULFATE 5 MG/ML
2.5 SOLUTION RESPIRATORY (INHALATION) EVERY 6 HOURS PRN
Status: DISCONTINUED | OUTPATIENT
Start: 2018-10-21 | End: 2018-10-24 | Stop reason: HOSPADM

## 2018-10-21 RX ORDER — AMOXICILLIN 250 MG
2 CAPSULE ORAL 2 TIMES DAILY
Status: DISCONTINUED | OUTPATIENT
Start: 2018-10-21 | End: 2018-10-24 | Stop reason: HOSPADM

## 2018-10-21 RX ORDER — PROCHLORPERAZINE MALEATE 5 MG
10 TABLET ORAL EVERY 6 HOURS PRN
Status: DISCONTINUED | OUTPATIENT
Start: 2018-10-21 | End: 2018-10-24

## 2018-10-21 RX ORDER — DEXAMETHASONE SODIUM PHOSPHATE 4 MG/ML
INJECTION, SOLUTION INTRA-ARTICULAR; INTRALESIONAL; INTRAMUSCULAR; INTRAVENOUS; SOFT TISSUE PRN
Status: DISCONTINUED | OUTPATIENT
Start: 2018-10-21 | End: 2018-10-21

## 2018-10-21 RX ORDER — GLYCOPYRROLATE 0.2 MG/ML
INJECTION, SOLUTION INTRAMUSCULAR; INTRAVENOUS PRN
Status: DISCONTINUED | OUTPATIENT
Start: 2018-10-21 | End: 2018-10-21

## 2018-10-21 RX ORDER — POTASSIUM CHLORIDE 7.45 MG/ML
10 INJECTION INTRAVENOUS
Status: DISCONTINUED | OUTPATIENT
Start: 2018-10-21 | End: 2018-10-24 | Stop reason: HOSPADM

## 2018-10-21 RX ORDER — ONDANSETRON 4 MG/1
4 TABLET, ORALLY DISINTEGRATING ORAL EVERY 6 HOURS PRN
Status: DISCONTINUED | OUTPATIENT
Start: 2018-10-21 | End: 2018-10-24 | Stop reason: HOSPADM

## 2018-10-21 RX ORDER — OXYCODONE HYDROCHLORIDE 5 MG/1
5-10 TABLET ORAL
Status: DISCONTINUED | OUTPATIENT
Start: 2018-10-21 | End: 2018-10-21

## 2018-10-21 RX ORDER — SODIUM CHLORIDE 9 MG/ML
INJECTION, SOLUTION INTRAVENOUS CONTINUOUS
Status: DISCONTINUED | OUTPATIENT
Start: 2018-10-21 | End: 2018-10-21

## 2018-10-21 RX ORDER — SODIUM CHLORIDE, SODIUM LACTATE, POTASSIUM CHLORIDE, CALCIUM CHLORIDE 600; 310; 30; 20 MG/100ML; MG/100ML; MG/100ML; MG/100ML
INJECTION, SOLUTION INTRAVENOUS CONTINUOUS
Status: DISCONTINUED | OUTPATIENT
Start: 2018-10-21 | End: 2018-10-21 | Stop reason: HOSPADM

## 2018-10-21 RX ORDER — EPHEDRINE SULFATE 50 MG/ML
INJECTION, SOLUTION INTRAMUSCULAR; INTRAVENOUS; SUBCUTANEOUS PRN
Status: DISCONTINUED | OUTPATIENT
Start: 2018-10-21 | End: 2018-10-21

## 2018-10-21 RX ORDER — MAGNESIUM HYDROXIDE 1200 MG/15ML
LIQUID ORAL PRN
Status: DISCONTINUED | OUTPATIENT
Start: 2018-10-21 | End: 2018-10-21 | Stop reason: HOSPADM

## 2018-10-21 RX ORDER — AMOXICILLIN 250 MG
1 CAPSULE ORAL 2 TIMES DAILY PRN
Status: DISCONTINUED | OUTPATIENT
Start: 2018-10-21 | End: 2018-10-24 | Stop reason: HOSPADM

## 2018-10-21 RX ORDER — ACETAMINOPHEN 325 MG/1
650 TABLET ORAL EVERY 4 HOURS PRN
Status: DISCONTINUED | OUTPATIENT
Start: 2018-10-21 | End: 2018-10-21

## 2018-10-21 RX ORDER — POLYETHYLENE GLYCOL 3350 17 G/17G
17 POWDER, FOR SOLUTION ORAL DAILY PRN
Status: DISCONTINUED | OUTPATIENT
Start: 2018-10-21 | End: 2018-10-24 | Stop reason: HOSPADM

## 2018-10-21 RX ORDER — HYDROMORPHONE HYDROCHLORIDE 1 MG/ML
.3-.5 INJECTION, SOLUTION INTRAMUSCULAR; INTRAVENOUS; SUBCUTANEOUS
Status: DISCONTINUED | OUTPATIENT
Start: 2018-10-21 | End: 2018-10-24 | Stop reason: HOSPADM

## 2018-10-21 RX ORDER — KETOROLAC TROMETHAMINE 30 MG/ML
30 INJECTION, SOLUTION INTRAMUSCULAR; INTRAVENOUS EVERY 6 HOURS
Status: COMPLETED | OUTPATIENT
Start: 2018-10-21 | End: 2018-10-22

## 2018-10-21 RX ORDER — ONDANSETRON 2 MG/ML
4 INJECTION INTRAMUSCULAR; INTRAVENOUS EVERY 6 HOURS PRN
Status: DISCONTINUED | OUTPATIENT
Start: 2018-10-21 | End: 2018-10-21

## 2018-10-21 RX ORDER — POTASSIUM CHLORIDE 1.5 G/1.58G
20-40 POWDER, FOR SOLUTION ORAL
Status: DISCONTINUED | OUTPATIENT
Start: 2018-10-21 | End: 2018-10-24 | Stop reason: HOSPADM

## 2018-10-21 RX ORDER — POTASSIUM CHLORIDE 29.8 MG/ML
20 INJECTION INTRAVENOUS
Status: DISCONTINUED | OUTPATIENT
Start: 2018-10-21 | End: 2018-10-24 | Stop reason: HOSPADM

## 2018-10-21 RX ORDER — ACETAMINOPHEN 650 MG/1
650 SUPPOSITORY RECTAL EVERY 4 HOURS PRN
Status: DISCONTINUED | OUTPATIENT
Start: 2018-10-21 | End: 2018-10-24 | Stop reason: HOSPADM

## 2018-10-21 RX ORDER — PROCHLORPERAZINE 25 MG
25 SUPPOSITORY, RECTAL RECTAL EVERY 12 HOURS PRN
Status: DISCONTINUED | OUTPATIENT
Start: 2018-10-21 | End: 2018-10-24 | Stop reason: HOSPADM

## 2018-10-21 RX ORDER — ACETAMINOPHEN 325 MG/1
650 TABLET ORAL EVERY 4 HOURS PRN
Status: DISCONTINUED | OUTPATIENT
Start: 2018-10-24 | End: 2018-10-24 | Stop reason: HOSPADM

## 2018-10-21 RX ORDER — LIDOCAINE 40 MG/G
CREAM TOPICAL
Status: DISCONTINUED | OUTPATIENT
Start: 2018-10-21 | End: 2018-10-24 | Stop reason: HOSPADM

## 2018-10-21 RX ORDER — CEFAZOLIN SODIUM 1 G/3ML
1 INJECTION, POWDER, FOR SOLUTION INTRAMUSCULAR; INTRAVENOUS EVERY 8 HOURS
Status: COMPLETED | OUTPATIENT
Start: 2018-10-21 | End: 2018-10-22

## 2018-10-21 RX ORDER — NALOXONE HYDROCHLORIDE 0.4 MG/ML
.1-.4 INJECTION, SOLUTION INTRAMUSCULAR; INTRAVENOUS; SUBCUTANEOUS
Status: DISCONTINUED | OUTPATIENT
Start: 2018-10-21 | End: 2018-10-24 | Stop reason: HOSPADM

## 2018-10-21 RX ORDER — PROPOFOL 10 MG/ML
INJECTION, EMULSION INTRAVENOUS PRN
Status: DISCONTINUED | OUTPATIENT
Start: 2018-10-21 | End: 2018-10-21

## 2018-10-21 RX ORDER — CEFAZOLIN SODIUM 1 G/3ML
1 INJECTION, POWDER, FOR SOLUTION INTRAMUSCULAR; INTRAVENOUS SEE ADMIN INSTRUCTIONS
Status: DISCONTINUED | OUTPATIENT
Start: 2018-10-21 | End: 2018-10-21 | Stop reason: HOSPADM

## 2018-10-21 RX ORDER — SODIUM CHLORIDE, SODIUM LACTATE, POTASSIUM CHLORIDE, CALCIUM CHLORIDE 600; 310; 30; 20 MG/100ML; MG/100ML; MG/100ML; MG/100ML
INJECTION, SOLUTION INTRAVENOUS CONTINUOUS
Status: DISCONTINUED | OUTPATIENT
Start: 2018-10-21 | End: 2018-10-24 | Stop reason: HOSPADM

## 2018-10-21 RX ORDER — DIPHENHYDRAMINE HYDROCHLORIDE 50 MG/ML
25 INJECTION INTRAMUSCULAR; INTRAVENOUS EVERY 6 HOURS PRN
Status: DISCONTINUED | OUTPATIENT
Start: 2018-10-21 | End: 2018-10-24 | Stop reason: HOSPADM

## 2018-10-21 RX ORDER — NALOXONE HYDROCHLORIDE 0.4 MG/ML
.1-.4 INJECTION, SOLUTION INTRAMUSCULAR; INTRAVENOUS; SUBCUTANEOUS
Status: DISCONTINUED | OUTPATIENT
Start: 2018-10-21 | End: 2018-10-22

## 2018-10-21 RX ORDER — ONDANSETRON 2 MG/ML
INJECTION INTRAMUSCULAR; INTRAVENOUS PRN
Status: DISCONTINUED | OUTPATIENT
Start: 2018-10-21 | End: 2018-10-21

## 2018-10-21 RX ORDER — DIPHENHYDRAMINE HCL 25 MG
25 CAPSULE ORAL EVERY 6 HOURS PRN
Status: DISCONTINUED | OUTPATIENT
Start: 2018-10-21 | End: 2018-10-24 | Stop reason: HOSPADM

## 2018-10-21 RX ORDER — ONDANSETRON 2 MG/ML
4 INJECTION INTRAMUSCULAR; INTRAVENOUS EVERY 6 HOURS PRN
Status: DISCONTINUED | OUTPATIENT
Start: 2018-10-21 | End: 2018-10-24 | Stop reason: HOSPADM

## 2018-10-21 RX ORDER — LIDOCAINE HYDROCHLORIDE 20 MG/ML
INJECTION, SOLUTION INFILTRATION; PERINEURAL PRN
Status: DISCONTINUED | OUTPATIENT
Start: 2018-10-21 | End: 2018-10-21

## 2018-10-21 RX ORDER — POTASSIUM CHLORIDE 1500 MG/1
20-40 TABLET, EXTENDED RELEASE ORAL
Status: DISCONTINUED | OUTPATIENT
Start: 2018-10-21 | End: 2018-10-24 | Stop reason: HOSPADM

## 2018-10-21 RX ORDER — AMOXICILLIN 250 MG
1 CAPSULE ORAL 2 TIMES DAILY
Status: DISCONTINUED | OUTPATIENT
Start: 2018-10-21 | End: 2018-10-24 | Stop reason: HOSPADM

## 2018-10-21 RX ORDER — ACETAMINOPHEN 325 MG/1
975 TABLET ORAL EVERY 8 HOURS
Status: DISCONTINUED | OUTPATIENT
Start: 2018-10-21 | End: 2018-10-24 | Stop reason: HOSPADM

## 2018-10-21 RX ORDER — PROPOFOL 10 MG/ML
INJECTION, EMULSION INTRAVENOUS CONTINUOUS PRN
Status: DISCONTINUED | OUTPATIENT
Start: 2018-10-21 | End: 2018-10-21

## 2018-10-21 RX ORDER — SODIUM CHLORIDE, SODIUM LACTATE, POTASSIUM CHLORIDE, CALCIUM CHLORIDE 600; 310; 30; 20 MG/100ML; MG/100ML; MG/100ML; MG/100ML
INJECTION, SOLUTION INTRAVENOUS CONTINUOUS PRN
Status: DISCONTINUED | OUTPATIENT
Start: 2018-10-21 | End: 2018-10-21

## 2018-10-21 RX ORDER — BISACODYL 10 MG
10 SUPPOSITORY, RECTAL RECTAL DAILY PRN
Status: DISCONTINUED | OUTPATIENT
Start: 2018-10-21 | End: 2018-10-24 | Stop reason: HOSPADM

## 2018-10-21 RX ORDER — ONDANSETRON 4 MG/1
4 TABLET, ORALLY DISINTEGRATING ORAL EVERY 6 HOURS PRN
Status: DISCONTINUED | OUTPATIENT
Start: 2018-10-21 | End: 2018-10-21

## 2018-10-21 RX ORDER — NEOSTIGMINE METHYLSULFATE 1 MG/ML
VIAL (ML) INJECTION PRN
Status: DISCONTINUED | OUTPATIENT
Start: 2018-10-21 | End: 2018-10-21

## 2018-10-21 RX ORDER — FENTANYL CITRATE 50 UG/ML
INJECTION, SOLUTION INTRAMUSCULAR; INTRAVENOUS PRN
Status: DISCONTINUED | OUTPATIENT
Start: 2018-10-21 | End: 2018-10-21

## 2018-10-21 RX ORDER — HYDROMORPHONE HYDROCHLORIDE 1 MG/ML
.2-.4 INJECTION, SOLUTION INTRAMUSCULAR; INTRAVENOUS; SUBCUTANEOUS
Status: DISCONTINUED | OUTPATIENT
Start: 2018-10-21 | End: 2018-10-21 | Stop reason: ALTCHOICE

## 2018-10-21 RX ORDER — PROCHLORPERAZINE MALEATE 5 MG
10 TABLET ORAL EVERY 6 HOURS PRN
Status: DISCONTINUED | OUTPATIENT
Start: 2018-10-21 | End: 2018-10-24 | Stop reason: HOSPADM

## 2018-10-21 RX ORDER — HYDROMORPHONE HYDROCHLORIDE 2 MG/1
2-4 TABLET ORAL EVERY 4 HOURS PRN
Status: DISCONTINUED | OUTPATIENT
Start: 2018-10-21 | End: 2018-10-24 | Stop reason: HOSPADM

## 2018-10-21 RX ORDER — AMOXICILLIN 250 MG
2 CAPSULE ORAL 2 TIMES DAILY PRN
Status: DISCONTINUED | OUTPATIENT
Start: 2018-10-21 | End: 2018-10-24 | Stop reason: HOSPADM

## 2018-10-21 RX ADMIN — ROCURONIUM BROMIDE 30 MG: 10 INJECTION INTRAVENOUS at 11:47

## 2018-10-21 RX ADMIN — KETOROLAC TROMETHAMINE 30 MG: 30 INJECTION, SOLUTION INTRAMUSCULAR at 21:02

## 2018-10-21 RX ADMIN — Medication 5 MG: at 13:39

## 2018-10-21 RX ADMIN — CEFAZOLIN SODIUM 2 G: 2 INJECTION, SOLUTION INTRAVENOUS at 12:08

## 2018-10-21 RX ADMIN — HYDROMORPHONE HYDROCHLORIDE 0.25 MG: 1 INJECTION, SOLUTION INTRAMUSCULAR; INTRAVENOUS; SUBCUTANEOUS at 13:28

## 2018-10-21 RX ADMIN — TRANEXAMIC ACID 1 G: 100 INJECTION, SOLUTION INTRAVENOUS at 14:21

## 2018-10-21 RX ADMIN — NEOSTIGMINE METHYLSULFATE 3 MG: 1 INJECTION, SOLUTION INTRAVENOUS at 14:02

## 2018-10-21 RX ADMIN — CEFAZOLIN 1 G: 1 INJECTION, POWDER, FOR SOLUTION INTRAMUSCULAR; INTRAVENOUS at 19:51

## 2018-10-21 RX ADMIN — FENTANYL CITRATE 50 MCG: 50 INJECTION, SOLUTION INTRAMUSCULAR; INTRAVENOUS at 12:39

## 2018-10-21 RX ADMIN — DEXMEDETOMIDINE HYDROCHLORIDE 8 MCG: 100 INJECTION, SOLUTION INTRAVENOUS at 12:40

## 2018-10-21 RX ADMIN — DEXMEDETOMIDINE HYDROCHLORIDE 8 MCG: 100 INJECTION, SOLUTION INTRAVENOUS at 13:26

## 2018-10-21 RX ADMIN — KETOROLAC TROMETHAMINE 30 MG: 30 INJECTION, SOLUTION INTRAMUSCULAR at 15:18

## 2018-10-21 RX ADMIN — Medication 5 MG: at 13:38

## 2018-10-21 RX ADMIN — PHENYLEPHRINE HYDROCHLORIDE 100 MCG: 10 INJECTION, SOLUTION INTRAMUSCULAR; INTRAVENOUS; SUBCUTANEOUS at 12:51

## 2018-10-21 RX ADMIN — SODIUM CHLORIDE, POTASSIUM CHLORIDE, SODIUM LACTATE AND CALCIUM CHLORIDE: 600; 310; 30; 20 INJECTION, SOLUTION INTRAVENOUS at 13:00

## 2018-10-21 RX ADMIN — ONDANSETRON 4 MG: 2 INJECTION INTRAMUSCULAR; INTRAVENOUS at 19:47

## 2018-10-21 RX ADMIN — HYDROMORPHONE HYDROCHLORIDE 0.25 MG: 1 INJECTION, SOLUTION INTRAMUSCULAR; INTRAVENOUS; SUBCUTANEOUS at 13:30

## 2018-10-21 RX ADMIN — LIDOCAINE HYDROCHLORIDE 40 MG: 20 INJECTION, SOLUTION INFILTRATION; PERINEURAL at 11:47

## 2018-10-21 RX ADMIN — Medication 10 MG: at 13:21

## 2018-10-21 RX ADMIN — MIDAZOLAM 2 MG: 1 INJECTION INTRAMUSCULAR; INTRAVENOUS at 11:47

## 2018-10-21 RX ADMIN — SODIUM CHLORIDE, POTASSIUM CHLORIDE, SODIUM LACTATE AND CALCIUM CHLORIDE: 600; 310; 30; 20 INJECTION, SOLUTION INTRAVENOUS at 16:42

## 2018-10-21 RX ADMIN — ONDANSETRON 4 MG: 2 INJECTION INTRAMUSCULAR; INTRAVENOUS at 12:35

## 2018-10-21 RX ADMIN — SODIUM CHLORIDE 500 ML: 9 INJECTION, SOLUTION INTRAVENOUS at 17:12

## 2018-10-21 RX ADMIN — SODIUM CHLORIDE, POTASSIUM CHLORIDE, SODIUM LACTATE AND CALCIUM CHLORIDE: 600; 310; 30; 20 INJECTION, SOLUTION INTRAVENOUS at 11:38

## 2018-10-21 RX ADMIN — PROPOFOL 50 MCG/KG/MIN: 10 INJECTION, EMULSION INTRAVENOUS at 11:49

## 2018-10-21 RX ADMIN — Medication 0.3 MG: at 16:43

## 2018-10-21 RX ADMIN — CEFAZOLIN SODIUM 1 G: 2 INJECTION, SOLUTION INTRAVENOUS at 14:08

## 2018-10-21 RX ADMIN — FENTANYL CITRATE 50 MCG: 50 INJECTION, SOLUTION INTRAMUSCULAR; INTRAVENOUS at 12:19

## 2018-10-21 RX ADMIN — DEXAMETHASONE SODIUM PHOSPHATE 4 MG: 4 INJECTION, SOLUTION INTRA-ARTICULAR; INTRALESIONAL; INTRAMUSCULAR; INTRAVENOUS; SOFT TISSUE at 12:34

## 2018-10-21 RX ADMIN — PROPOFOL 200 MG: 10 INJECTION, EMULSION INTRAVENOUS at 11:47

## 2018-10-21 RX ADMIN — DEXMEDETOMIDINE HYDROCHLORIDE 4 MCG: 100 INJECTION, SOLUTION INTRAVENOUS at 13:28

## 2018-10-21 RX ADMIN — GLYCOPYRROLATE 0.6 MG: 0.2 INJECTION, SOLUTION INTRAMUSCULAR; INTRAVENOUS at 14:02

## 2018-10-21 ASSESSMENT — ACTIVITIES OF DAILY LIVING (ADL)
SWALLOWING: 0-->SWALLOWS FOODS/LIQUIDS WITHOUT DIFFICULTY
RETIRED_EATING: 0-->INDEPENDENT
COGNITION: 0 - NO COGNITION ISSUES REPORTED
BATHING: 1-->ASSISTIVE EQUIPMENT
DRESS: 0-->INDEPENDENT
TOILETING: 1-->ASSISTIVE EQUIPMENT
ADLS_ACUITY_SCORE: 11
AMBULATION: 0-->INDEPENDENT
RETIRED_COMMUNICATION: 0-->UNDERSTANDS/COMMUNICATES WITHOUT DIFFICULTY
TRANSFERRING: 0-->INDEPENDENT

## 2018-10-21 ASSESSMENT — ENCOUNTER SYMPTOMS
NUMBNESS: 0
WEAKNESS: 0
SHORTNESS OF BREATH: 0
ARTHRALGIAS: 1
HEADACHES: 0
JOINT SWELLING: 1

## 2018-10-21 NOTE — ANESTHESIA POSTPROCEDURE EVALUATION
Patient: Izabella Concepcion    Procedure(s):  RIGHT TOTAL HIP ARTHROPLASTY    Diagnosis:Right femoral neck fracture  Diagnosis Additional Information: No value filed.    Anesthesia Type:  General, ETT    Note:  Anesthesia Post Evaluation    Patient location during evaluation: PACU  Patient participation: Able to fully participate in evaluation  Level of consciousness: awake  Pain management: adequate  Airway patency: patent  Cardiovascular status: acceptable  Respiratory status: acceptable  Hydration status: acceptable  PONV: controlled     Anesthetic complications: None          Last vitals:  Vitals:    10/21/18 1500 10/21/18 1510 10/21/18 1520   BP: 109/68 101/65 109/67   Pulse:      Resp: 11 10 16   Temp:   37.3  C (99.1  F)   SpO2: 95% 95% 94%         Electronically Signed By: Laurie Long MD  October 21, 2018  3:31 PM
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
## 2018-10-21 NOTE — ED NOTES
"[facility]  ED Nurse Handoff Report    ED Chief complaint: Hip Pain (coming from home. pt was getting OOB and tripped over large decorative pillows on the floor, hitting shelf and landing on right side, spec hip. unable to stand. pt was able to get cell phone call daughter lives downstairs and call 911. )      ED Diagnosis:   Final diagnoses:   Closed fracture of right hip, initial encounter (H)   Fall, initial encounter       Code Status: Full Code    Allergies:   Allergies   Allergen Reactions     Vigamox [Moxifloxacin] Swelling       Activity level - Baseline/Home:  Independent    Activity Level - Current:   Total Care     Needed?: No    Isolation: No  Infection: Not Applicable  Bariatric?: No    Vital Signs:   Vitals:    10/21/18 0820   BP: 129/66   Pulse: 65   Resp: 18   Temp: 97.4  F (36.3  C)   TempSrc: Oral   SpO2: 95%   Weight: 70.3 kg (155 lb)   Height: 1.6 m (5' 3\")       Cardiac Rhythm: ,        Pain level: 0-10 Pain Scale: 2    Is this patient confused?: No   [place] - Suicide Severity Rating Scale Completed?  Yes  If yes, what color did the patient score?  White    Patient Report: Initial Complaint: fall  Focused Assessment: pt was getting OOB when tripped over pillows on the floor, running into a book shelf and landing on right hip. Pt has right hip Fx and will need further intervention.   Tests Performed: labs, XR  Abnormal Results: pending  Treatments provided: none yet    Family Comments: daughter at bedside    OBS brochure/video discussed/provided to patient/family: N/A              Name of person given brochure if not patient: NA              Relationship to patient: NA    ED Medications: Medications - No data to display    Drips infusing?:  No    For the majority of the shift this patient was Green.   Interventions performed were none.    Severe Sepsis OR Septic Shock Diagnosis Present: No    To be done/followed up on inpatient unit:      ED NURSE PHONE NUMBER: " *[phone]

## 2018-10-21 NOTE — ED PROVIDER NOTES
"  History     Chief Complaint:  Right hip pain    HPI   Izabella Concepcion is a 62 year old female who presents with right hip pain and fall. This morning at 0715 the patient stepped out of bed in which her right big toe got stuck and she tripped over some pillows, fell onto a book case, and landed on the ground onto her right hip. She describes feeling \"sick to her stomach\". She did not hit her head or neck and did not lose consciousness. She was able to call EMS and was given Dilaudid en-route. Here the patient complains of right lateral hip pain that is painful to move. She otherwise denies a headache, back pain, or neck pain. She denies numbness or weakness in the limb.    Allergies:  Vigamox [Moxifloxacin]      Medications:    Xonpenex  Multivitamin      Past Medical History:    Asthma  Hyperlipidemia   Statin intolerance     Past Surgical History:    Right arm fracture  Tonsillectomy     Family History:    Stroke, MI, Diabetes     Social History:  Smoking Status: Former Smoker  Alcohol Use: Yes  Patient presents with family  Marital Status:        Review of Systems   Respiratory: Negative for shortness of breath.    Cardiovascular: Negative for chest pain.   Musculoskeletal: Positive for arthralgias and joint swelling.   Neurological: Negative for syncope, weakness, numbness and headaches.   All other systems reviewed and are negative.      Physical Exam   First Vitals:  BP: 129/66  Pulse: 65  Heart Rate: 81  Temp: 97.4  F (36.3  C)  Resp: 18  Height: 160 cm (5' 3\")  Weight: 70.3 kg (155 lb)  SpO2: 95 %      Physical Exam  Vitals: reviewed by me  General: Pt seen on [facility], pleasant, cooperative, and alert to conversation, does appear to be in some pain however.  Eyes: Tracking well, clear conjunctiva BL  ENT: MMM, midline trachea.   Lungs:  No tachypnea, no accessory muscle use. No respiratory distress.   CV: Rate as above, regular rhythm.    Abd: Soft, non tender, no guarding, no rebound. Non " distended  MSK: no peripheral edema or joint effusion.    Does have tenderness to palpation to right hip on palpation.  Does have painful logroll of right lower extremity.  Right lower extremities with sensation intact light touch throughout, able to wiggle toes.  DP and PT are intact, foot is warm well perfused.  No pain to knee.  No skin breaks, no skin changes.  Skin: No rash, normal turgor and temperature  Neuro: Clear speech and no facial droop.  Psych: Not RIS, no e/o AH/VH      Emergency Department Course     ECG (9:05:31):  Rate 64 bpm. WV interval 160. QRS duration 96. QT/QTc 422/435. P-R-T axes 77 -12 46. Normal sinus rhythm. Low voltage QRS. Incomplete right bundle branch block. Borderline ECG. Interpreted at 0910 by Dany Rendon MD.     Imaging:  Radiographic findings were communicated with the patient who voiced understanding of the findings.     Foot  XR, G/E 3 views, right  Three views right foot. No fracture or dislocation. No  significant soft tissue swelling. No radiopaque foreign body is  appreciated. Mild degenerative changes are noted at the  metatarsophalangeal joint of the great toe.  JOHNNIE OBRIEN MD    XR Femur Right 2 Views  Transverse fracture of the femoral neck with impaction and  external rotation of the distal fracture fragment.    IESHA LANIER MD    XR Pelvis 1/2 Views  AP view of the pelvis is performed. Femoral heads are  located but there does appear to be a femoral neck fracture involving  the right hip. No obvious left hip fracture. Pelvic ring appears  intact. Bowel gas pattern is unremarkable.  JOHNNIE OBRIEN MD    Laboratory:  CBC: WNL (WBC 8.1, HGB 12.7, )   CMP: Glucose 117 (H), Ca 8.1 (L), Protein total 6.6 (L), o/w (Creatinine 0.58)   INR: 0.96  PTT: 27  Blood Culture x2: Pending     Interventions:  1208 - Ancef 2g IVPB    Emergency Department Course:  The patient arrived in the emergency department via EMS.   Past medical records, nursing notes, and  vitals reviewed.  0822: I performed an exam of the patient and obtained history, as documented above.      The patient was sent for a X-ray while in the emergency department, findings above.     Findings and plan explained to the Patient who consents to admission.     0925: Discussed the patient with Dr. Lopez, who will admit the patient to a medical bed for further monitoring, evaluation, and treatment.      23: I discussed the case with Dr. Padilla of Orthopedics regarding the patient.     Impression & Plan      Medical Decision [place]inyo female who presents to the ED with a right sided hip fracture at the femoral neck after a mechanical fall. She gives a great story for mechanical fall, and has no evidence of syncope. Her distal right extremity is warm and well perfused, and is neurovascularly intact. She has been ordered to get Pre-op labs as well as a pre-op EKG, and I spoke with Dr. Lopez who generously agrees to accept care of the patient to an ortho floor. I paged ortho as well for operative planning. We will admit as above. Pain was well controlled with Dilaudid.    Diagnosis:    ICD-10-CM   1. Closed fracture of right hip, initial encounter (H) S72.001A       2. Fall, initial encounter W19.XXXA       Mindy Eastman  10/21/2018    EMERGENCY DEPARTMENT  I, Mindy Eastman, am serving as a scribe at 8:22 AM on 10/21/2018 to document services personally performed by Dany Rendon based on my observations and the provider's statements to me.          Dany Rendon MD  10/21/18 8509

## 2018-10-21 NOTE — IP AVS SNAPSHOT
[address] [facility]    [address] [place] [address]    Phone:  [phone]                                       After Visit Summary   10/21/2018    Izabella Concepcion    MRN: [id number]           After Visit Summary Signature Page     I have received my discharge instructions, and my questions have been answered. I have discussed any challenges I see with this plan with the nurse or doctor.    ..........................................................................................................................................  Patient/Patient Representative Signature      ..........................................................................................................................................  Patient Representative Print Name and Relationship to Patient    ..................................................               ................................................  Date                                   Time    ..........................................................................................................................................  Reviewed by Signature/Title    ...................................................              ..............................................  Date                                               Time          22EPIC Rev 08/18

## 2018-10-21 NOTE — ANESTHESIA CARE TRANSFER NOTE
Patient: Izabella Concepcion    Procedure(s):  RIGHT TOTAL HIP ARTHROPLASTY    Diagnosis: Right femoral neck fracture  Diagnosis Additional Information: No value filed.    Anesthesia Type:   General, ETT     Note:  Airway :Face Mask  Patient transferred to:PACU  Comments: Pt to PACU. VSS. Report complete to RNHandoff Report: Identifed the Patient, Identified the Reponsible Provider, Reviewed the pertinent medical history, Discussed the surgical course, Reviewed Intra-OP anesthesia mangement and issues during anesthesia, Set expectations for post-procedure period and Allowed opportunity for questions and acknowledgement of understanding      Vitals: (Last set prior to Anesthesia Care Transfer)    CRNA VITALS  10/21/2018 1357 - 10/21/2018 1435      10/21/2018             Resp Rate (observed): (!)  3                Electronically Signed By: Deepti Acosta CRNA, APRN GEOFF  October 21, 2018  2:35 PM

## 2018-10-21 NOTE — BRIEF OP NOTE
[facility] Brief Operative Note    Pre-operative diagnosis: Right femoral neck fracture   Post-operative diagnosis same   Procedure: Procedure(s):  RIGHT TOTAL HIP ARTHROPLASTY   Surgeon(s): Surgeon(s) and Role:     * Ivan Padilla MD - Primary     * Toshia Villarreal PA-C - Assisting   Estimated blood loss: 300 mL    Specimens: * No specimens in log *   Findings: See operative dictation    Plan:  - wbat  - pt/ot/sw  - pain control  - dvt prophylaxis with lovenox while in hospital, transition to aspirin 325 BID upon discharge for 6 weeks

## 2018-10-21 NOTE — ED NOTES
Bed: ED03  Expected date: 10/21/18  Expected time: 8:04 AM  Means of arrival: Ambulance  Comments:  [age]f right hip pain  Eta 0835   Recommended observation.
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
## 2018-10-21 NOTE — OP NOTE
Procedure Date: 10/21/2018      PREOPERATIVE DIAGNOSIS:  Right femoral neck fracture.      POSTOPERATIVE DIAGNOSIS:  Right femoral neck fracture.      PROCEDURE:  Right total hip arthroplasty.      SURGEON:  Ivan Padilla MD      FIRST ASSISTANT:  Toshia Villarreal PA-C      A skilled first assistant was necessary for patient positioning, prepping and draping, help with retraction, help with holding the leg as well as reduction maneuvers, and closing and patient transfer.      ANESTHESIA:  General and local.      COMPLICATIONS:  None apparent.      IMPLANTS:   1.  Irizarry and Nephew Anthology standard offset femoral stem, size 6.   2.  Smith and Nephew R3 3-hole hemispherical cup, size 54.   3.  Smith and Nephew acetabular liner with a 20-degree lip.   4.  Smith and Nephew Oxinium, size 36+4 femoral head.   5.  Two Smith & Nephew 6.5 diameter cancellous screws for the acetabulum.      INDICATIONS:  The patient is a 62-year-old female with no prior medical history who had a trip and fall today on a decorative pillow.  She landed on her right side.  She had inability to weightbear and severe pain in her right hip.  EMS was called and she was brought to the Emergency Department where she was diagnosed with a displaced femoral neck fracture.  Orthopedics was consulted.  After discussion of treatment options with the patient as well as the family, we decided the best way to move forward would be a total hip arthroplasty given her relatively young age and activity status.  They agreed to proceed.      DESCRIPTION OF PROCEDURE:  On the date of the procedure, the patient was met in the preoperative area by the surgery and anesthesia team.  Her right hip was marked by the operative surgeon and informed consent was obtained.  Risks and benefits of the surgery were discussed with the patient as well as with the family including risk of bleeding, infection, damage to neurovascular structures, risk of blood clot, risk of  dislocation, as well as risk of need for future surgery for possible revision.  They understood and they agreed to proceed.      The patient was then brought to the operating room and general anesthesia was administered.  A Vazquez catheter was placed and she was then placed on the operating table in supine position.  She was then flipped into the lateral decubitus position with the right hip facing up.  Preoperative antibiotics were given and the right hip was prepped and draped in the usual sterile fashion.  Preoperative timeout was then performed.  We began the procedure by making a standard incision over the greater trochanter for a posterolateral approach.  Sharp dissection was carried down through the skin as well as the subcutaneous tissue down to the fascia and the gluteus adrienne.  The fascia was split sharply along the decussating fibers of the gluteus adrienne and a Charnley retractor was placed.        We then visualized the bursa over greater trochanter which was removed.  We then placed a cobra retractor down to the gluteus medius and under the gluteus medius and visualized the short external rotators as well as the piriformis.  These were tagged using a #5 FiberWire and released off their femoral insertion.  We then visualized the capsule.  The capsule was split in a T-type fashion.  A T-shaped capsulotomy was made and the leaflets were tagged using #2 FiberWire and a rush of hematoma was expelled out from the area and we visualized the femoral neck fracture.  The head was then removed using a corkscrew and the pulvinar was also removed.  The labrum was also removed using electrocautery and we then freshened up the cut of the proximal femur approximately 1 cm just proximal to the lesser trochanter using a femoral neck cut guide.        We then turned our attention to the acetabulum and sequentially reamed up to a size 52.  We were able to ream all the way down into the nice cancellous bone and upon  trialing everything it looked fine, and then upon opening a true size 52 spherical shell, we could not get a good bite and it was spinning in the rim.  Therefore, we reamed up to a size 53 and impacted in a size 54 Smith & Nephew acetabular shell.  This had excellent fixation.  However, we did place 2 cancellous screws into the pelvis for further fixation, we then placed a Smith & Nephew liner with a 20-degree tilt that was facing posterior superiorly to prevent further dislocation issues and then the wound was packed and we turned our attention to the proximal femur.        A box osteotome was used followed by a canal finder.  We then broached up to a size 6 which had excellent fit.  We trialed the standard neck with a +0 head.  It had excellent stability.  We were able to flex up to approximately 90 degrees and internally rotate to approximately 70 prior to a dislocation event.  The trials were then removed.  A true size 6 Anthology porous coated Irizarry and Nephew femoral stem was impacted into place in the appropriate amount of anteversion with a standard neck.  We trialed once again with a 0 as well as a +4 and determined that a +4 had a better leg length, therefore a true size +4 size 36 Oxinium head was impacted onto the stem and reduced.        The wound was then copiously irrigated and we turned our attention to closure.  Two bone tunnels were placed through the greater trochanter and the capsule as well as the short external rotators were repaired over the bone tunnels.  We then injected a local cocktail into the gluteus medius, adrienne, IT band, as well as in the short external rotators for pain control.  The IT band and gluteus adrienne fascia were closed with #1 Vicryl followed by 2-0 Vicryl for deep dermals and a running 4-0 Monocryl for skin.  Steri-Strips were applied.  Sterile dressing was applied.  The patient was then placed into a hip abduction wedge and awakened from anesthesia in stable condition  and brought to the PACU for recovery.         CLAUDIA NUGENT MD             D: 10/21/2018   T: 10/21/2018   MT: IKE      Name:     TERRENCE GURROLA   MRN:      [id number]        Account:        [id number]   :      1955           Procedure Date: 10/21/2018      Document: [id number]

## 2018-10-21 NOTE — IP AVS SNAPSHOT
MRN:[id number]                      After Visit Summary   10/21/2018    Izabella Concepcion    MRN: [id number]           Thank you!     Thank you for choosing [facility] for your care. Our goal is always to provide you with excellent care. Hearing back from our patients is one way we can continue to improve our services. Please take a few minutes to complete the written survey that you may receive in the mail after you visit with us. Thank you!        Patient Information     Date Of Birth          1955        Designated Caregiver       Most Recent Value    Caregiver    Will someone help with your care after discharge? yes    Name of designated caregiver Kjersten    Phone number of caregiver [phone]    Caregiver address [place]      About your hospital stay     You were admitted on:  October 21, 2018 You last received care in the:  [address] [facility]    You were discharged on:  October 24, 2018        Reason for your hospital stay       Right total hip arthroplasty due to femoral neck fracture                  Who to Call     For medical emergencies, please call 911.  For non-urgent questions about your medical care, please call your primary care provider or clinic, [phone]  For questions related to your surgery, please call your surgery clinic        Attending Provider     Provider Specialty    Dany Rendon MD Emergency Medicine    [facility]       Primary Care Provider Office Phone # Fax #    Blanco Wang [phone]      After Care Instructions     Activity       Your activity upon discharge: activity as tolerated and no driving while on analgesics            Diet       Follow this diet upon discharge: Orders Placed This Encounter      Advance Diet as Tolerated: Regular Diet Adult            Wound care and dressings       Instructions to care for your wound at home: as directed, keep  "wound clean and dry and reinforce dressing as needed.                  Follow-up Appointments     Follow-up and recommended labs and tests        Follow up with Dr. Padilla , at (location with clinic name or city) [facility], within 2 weeks to evaluate after surgery. No follow up labs or test are needed.  Call [facility] for appointment [phone]                  Additional Services     Home Care OT Referral for Hospital Discharge       OT to eval and treat    Your provider has ordered home care - occupational therapy. If you have not been contacted within 2 days of your discharge please call the department phone number listed on the top of this document.            Home Care PT Referral for Hospital Discharge       PT to eval and treat    Your provider has ordered home care - physical therapy. If you have not been contacted within 2 days of your discharge please call the department phone number listed on the top of this document.                  Further instructions from your care team       Home care arranged by:  [facility]  Phone number #[phone]    Pending Results     Date and Time Order Name Status Description    10/23/2018 1206 Urine Culture Aerobic Bacterial Preliminary             Statement of Approval     Ordered          10/24/18 1408  I have reviewed and agree with all the recommendations and orders detailed in this document.  EFFECTIVE NOW     Approved and electronically signed by:  Dajuan Luevano MD             Admission Information     Date & Time Provider Department Dept. Phone    10/21/2018 Jase Lopez MD [facility] [place] [address] [facility] [phone]      Your Vitals Were     Blood Pressure Pulse Temperature Respirations Height Weight    115/65 (BP Location: Left arm) 73 97.8  F (36.6  C) (Oral) 16 1.6 m (5' 3\") 70.3 kg (155 lb)    Pulse Oximetry BMI (Body Mass Index)                96% 27.46 kg/m2          MyChart Information     [facility] gives you secure " access to your electronic health record. If you see a primary care provider, you can also send messages to your care team and make appointments. If you have questions, please call your primary care clinic.  If you do not have a primary care provider, please call [phone] and they will assist you.        Care EveryWhere ID     This is your Care EveryWhere ID. This could be used by other organizations to access your [facility] medical records  [id number]        Equal Access to Services     DIONNE MONTOYA : Hadii daniel jensen hadasho Soomaali, waaxda luqadaha, qaybta kaalmada adeegyada, karo villaseñor. So [facility] [phone].    ATENCIÓN: Si habla cynthia, tiene a curiel disposición servicios gratuitos de asistencia lingüística. Llame al [phone].    We comply with applicable federal civil rights laws and [place] laws. We do not discriminate on the basis of race, color, national origin, age, disability, sex, sexual orientation, or gender identity.               Review of your medicines      START taking        Dose / Directions    acetaminophen 325 MG tablet   Commonly known as:  TYLENOL   Used for:  Closed fracture of right hip, initial encounter (H)        Dose:  650 mg   Take 2 tablets (650 mg) by mouth every 4 hours as needed for mild pain   Quantity:  40 tablet   Refills:  0       aspirin 325 MG tablet   Used for:  Closed fracture of right hip, initial encounter (H)        Dose:  325 mg   Take 1 tablet (325 mg) by mouth daily   Quantity:  30 tablet   Refills:  0       HYDROmorphone 2 MG tablet   Commonly known as:  DILAUDID   Used for:  Closed fracture of right hip, initial encounter (H)        Dose:  2 mg   Take 1 tablet (2 mg) by mouth every 4 hours as needed for moderate to severe pain   Quantity:  20 tablet   Refills:  0       ondansetron 4 MG ODT tab   Commonly known as:  ZOFRAN-ODT   Used for:  Closed fracture of right hip, initial encounter (H)        Dose:  4 mg   Take 1 tablet (4  mg) by mouth every 6 hours as needed for nausea or vomiting   Quantity:  10 tablet   Refills:  0       order for DME   Used for:  Closed fracture of right hip, initial encounter (H)        Equipment being ordered: Walker Wheels () and Walker () Treatment Diagnosis: Gait instability   Quantity:  1 each   Refills:  0       senna-docusate 8.6-50 MG per tablet   Commonly known as:  SENOKOT-S;PERICOLACE   Used for:  Closed fracture of right hip, initial encounter (H)        Dose:  2 tablet   Take 2 tablets by mouth 2 times daily   Quantity:  40 tablet   Refills:  0         CONTINUE these medicines which have NOT CHANGED        Dose / Directions    MULTI-VITAMINS Tabs        Dose:  1 tablet   Take 1 tablet by mouth daily   Refills:  0            Where to get your medicines      Some of these will need a paper prescription and others can be bought over the counter. Ask your nurse if you have questions.     Bring a paper prescription for each of these medications     acetaminophen 325 MG tablet    aspirin 325 MG tablet    HYDROmorphone 2 MG tablet    ondansetron 4 MG ODT tab    order for DME    senna-docusate 8.6-50 MG per tablet                Protect others around you: Learn how to safely use, store and throw away your medicines at www.disposemymeds.org.        Information about OPIOIDS     PRESCRIPTION OPIOIDS: WHAT YOU NEED TO KNOW   We gave you an opioid (narcotic) pain medicine. It is important to manage your pain, but opioids are not always the best choice. You should first try all the other options your care team gave you. Take this medicine for as short a time (and as few doses) as possible.    Some activities can increase your pain, such as bandage changes or therapy sessions. It may help to take your pain medicine 30 to 60 minutes before these activities. Reduce your stress by getting enough sleep, working on hobbies you enjoy and practicing relaxation or meditation. Talk to your care team about ways to  manage your pain beyond prescription opioids.    These medicines have risks:    DO NOT drive when on new or higher doses of pain medicine. These medicines can affect your alertness and reaction times, and you could be arrested for driving under the influence (DUI). If you need to use opioids long-term, talk to your care team about driving.    DO NOT operate heavy machinery    DO NOT do any other dangerous activities while taking these medicines.    DO NOT drink any alcohol while taking these medicines.     If the opioid prescribed includes acetaminophen, DO NOT take with any other medicines that contain acetaminophen. Read all labels carefully. Look for the word  acetaminophen  or  Tylenol.  Ask your pharmacist if you have questions or are unsure.    You can get addicted to pain medicines, especially if you have a history of addiction (chemical, alcohol or substance dependence). Talk to your care team about ways to reduce this risk.    All opioids tend to cause constipation. Drink plenty of water and eat foods that have a lot of fiber, such as fruits, vegetables, prune juice, apple juice and high-fiber cereal. Take a laxative (Miralax, milk of magnesia, Colace, Senna) if you don t move your bowels at least every other day. Other side effects include upset stomach, sleepiness, dizziness, throwing up, tolerance (needing more of the medicine to have the same effect), physical dependence and slowed breathing.    Store your pills in a secure place, locked if possible. We will not replace any lost or stolen medicine. If you don t finish your medicine, please throw away (dispose) as directed by your pharmacist. The [place] Pollution [employer] has more information about safe disposal: https://www.pca.[facility].[place].us/living-green/managing-unwanted-medications             Medication List: This is a list of all your medications and when to take them. Check marks below indicate your daily home schedule. Keep this list as a  reference.      Medications           Morning Afternoon Evening Bedtime As Needed    acetaminophen 325 MG tablet   Commonly known as:  TYLENOL   Take 2 tablets (650 mg) by mouth every 4 hours as needed for mild pain   Last time this was given:  975 mg on 10/24/2018  8:34 AM   Next Dose Due:  Available anytime                                aspirin 325 MG tablet   Take 1 tablet (325 mg) by mouth daily   Next Dose Due:  10/25 AM                                   HYDROmorphone 2 MG tablet   Commonly known as:  DILAUDID   Take 1 tablet (2 mg) by mouth every 4 hours as needed for moderate to severe pain   Last time this was given:  2 mg on 10/23/2018  8:24 AM   Next Dose Due:  Available anytime                                MULTI-VITAMINS Tabs   Take 1 tablet by mouth daily   Next Dose Due:  Resume home scheduling                                    ondansetron 4 MG ODT tab   Commonly known as:  ZOFRAN-ODT   Take 1 tablet (4 mg) by mouth every 6 hours as needed for nausea or vomiting   Next Dose Due:  Available anytime                                order for DME   Equipment being ordered: Walker Wheels () and Walker () Treatment Diagnosis: Gait instability                                senna-docusate 8.6-50 MG per tablet   Commonly known as:  SENOKOT-S;PERICOLACE   Take 2 tablets by mouth 2 times daily   Last time this was given:  2 tablets on 10/23/2018 10:22 PM   Next Dose Due:  Available anytime

## 2018-10-21 NOTE — PHARMACY-ADMISSION MEDICATION HISTORY
Admission medication history interview status for the 10/21/2018  admission is complete. See EPIC admission navigator for prior to admission medications     Medication history source reliability:Good    Actions taken by pharmacist (provider contacted, etc):None     Additional medication history information not noted on PTA med list :None    Medication reconciliation/reorder completed by provider prior to medication history? No    Time spent in this activity: 5    Prior to Admission medications    Medication Sig Last Dose Taking? Auth Provider   Multiple Vitamin (MULTI-VITAMINS) TABS Take 1 tablet by mouth daily  Past Week at Unknown time Yes Reported, Patient

## 2018-10-22 ENCOUNTER — APPOINTMENT (OUTPATIENT)
Dept: PHYSICAL THERAPY | Facility: CLINIC | Age: 63
End: 2018-10-22
Attending: ORTHOPAEDIC SURGERY
Payer: COMMERCIAL

## 2018-10-22 LAB
ABO + RH BLD: ABNORMAL
ABO + RH BLD: ABNORMAL
BLD GP AB INVEST PLASRBC-IMP: ABNORMAL
BLD GP AB SCN SERPL QL: ABNORMAL
BLD PROD TYP BPU: ABNORMAL
BLOOD BANK CMNT PATIENT-IMP: ABNORMAL
BLOOD BANK CMNT PATIENT-IMP: ABNORMAL
GLUCOSE BLDC GLUCOMTR-MCNC: 106 MG/DL (ref 70–99)
HGB BLD-MCNC: 9.2 G/DL (ref 11.7–15.7)
NUM BPU REQUESTED: 2
SPECIMEN EXP DATE BLD: ABNORMAL

## 2018-10-22 PROCEDURE — 25000128 H RX IP 250 OP 636: Performed by: INTERNAL MEDICINE

## 2018-10-22 PROCEDURE — 97530 THERAPEUTIC ACTIVITIES: CPT | Mod: GP

## 2018-10-22 PROCEDURE — 25000128 H RX IP 250 OP 636: Performed by: ORTHOPAEDIC SURGERY

## 2018-10-22 PROCEDURE — 12000007 ZZH R&B INTERMEDIATE

## 2018-10-22 PROCEDURE — 97162 PT EVAL MOD COMPLEX 30 MIN: CPT | Mod: GP

## 2018-10-22 PROCEDURE — 85018 HEMOGLOBIN: CPT | Performed by: ORTHOPAEDIC SURGERY

## 2018-10-22 PROCEDURE — 40000193 ZZH STATISTIC PT WARD VISIT

## 2018-10-22 PROCEDURE — 99232 SBSQ HOSP IP/OBS MODERATE 35: CPT | Performed by: INTERNAL MEDICINE

## 2018-10-22 PROCEDURE — 97110 THERAPEUTIC EXERCISES: CPT | Mod: GP

## 2018-10-22 PROCEDURE — 25000132 ZZH RX MED GY IP 250 OP 250 PS 637: Performed by: ORTHOPAEDIC SURGERY

## 2018-10-22 PROCEDURE — 25000132 ZZH RX MED GY IP 250 OP 250 PS 637: Performed by: PHYSICIAN ASSISTANT

## 2018-10-22 PROCEDURE — 36415 COLL VENOUS BLD VENIPUNCTURE: CPT | Performed by: ORTHOPAEDIC SURGERY

## 2018-10-22 PROCEDURE — 00000146 ZZHCL STATISTIC GLUCOSE BY METER IP

## 2018-10-22 PROCEDURE — 97116 GAIT TRAINING THERAPY: CPT | Mod: GP

## 2018-10-22 RX ADMIN — CEFAZOLIN 1 G: 1 INJECTION, POWDER, FOR SOLUTION INTRAMUSCULAR; INTRAVENOUS at 03:49

## 2018-10-22 RX ADMIN — ACETAMINOPHEN 975 MG: 325 TABLET, FILM COATED ORAL at 00:17

## 2018-10-22 RX ADMIN — SODIUM CHLORIDE, POTASSIUM CHLORIDE, SODIUM LACTATE AND CALCIUM CHLORIDE: 600; 310; 30; 20 INJECTION, SOLUTION INTRAVENOUS at 03:54

## 2018-10-22 RX ADMIN — HYDROMORPHONE HYDROCHLORIDE 2 MG: 2 TABLET ORAL at 22:41

## 2018-10-22 RX ADMIN — HYDROMORPHONE HYDROCHLORIDE 2 MG: 2 TABLET ORAL at 13:39

## 2018-10-22 RX ADMIN — SODIUM CHLORIDE 500 ML: 9 INJECTION, SOLUTION INTRAVENOUS at 16:09

## 2018-10-22 RX ADMIN — ACETAMINOPHEN 975 MG: 325 TABLET, FILM COATED ORAL at 08:55

## 2018-10-22 RX ADMIN — KETOROLAC TROMETHAMINE 30 MG: 30 INJECTION, SOLUTION INTRAMUSCULAR at 03:49

## 2018-10-22 RX ADMIN — SENNOSIDES AND DOCUSATE SODIUM 2 TABLET: 8.6; 5 TABLET ORAL at 20:08

## 2018-10-22 RX ADMIN — KETOROLAC TROMETHAMINE 30 MG: 30 INJECTION, SOLUTION INTRAMUSCULAR at 08:55

## 2018-10-22 RX ADMIN — SENNOSIDES AND DOCUSATE SODIUM 2 TABLET: 8.6; 5 TABLET ORAL at 08:55

## 2018-10-22 RX ADMIN — ACETAMINOPHEN 975 MG: 325 TABLET, FILM COATED ORAL at 16:16

## 2018-10-22 RX ADMIN — HYDROMORPHONE HYDROCHLORIDE 2 MG: 2 TABLET ORAL at 07:20

## 2018-10-22 RX ADMIN — ENOXAPARIN SODIUM 40 MG: 40 INJECTION SUBCUTANEOUS at 08:55

## 2018-10-22 RX ADMIN — SODIUM CHLORIDE, POTASSIUM CHLORIDE, SODIUM LACTATE AND CALCIUM CHLORIDE: 600; 310; 30; 20 INJECTION, SOLUTION INTRAVENOUS at 17:19

## 2018-10-22 ASSESSMENT — ACTIVITIES OF DAILY LIVING (ADL)
ADLS_ACUITY_SCORE: 11
ADLS_ACUITY_SCORE: 9
ADLS_ACUITY_SCORE: 11
ADLS_ACUITY_SCORE: 11
ADLS_ACUITY_SCORE: 9
ADLS_ACUITY_SCORE: 9

## 2018-10-22 NOTE — PLAN OF CARE
Problem: Patient Care Overview  Goal: Plan of Care/Patient Progress Review  Discharge Planner PT   Patient plan for discharge: Home with assist from daughter and HHPT     Current status:   Pt is 63 y/o female POD #1 XAVIER after mechanical fall and fracture. Pt states she is IND at baseline however has fall history, see flowsheet in care plan note for detailed history     Eval complete, treatment initiated. Edu on PT role, POC, posterior hip precautions, WBAT. Pt maintained precautions with some cues during session. VSS during however soft BP, symptomatic toward end of session after short distance gait training see vitals flowsheet. Pt engaged in supine XAVIER exercises. Good ms activation noted. Pt performed Supine > sit with CGA and cues to maintain precautions. STS x 2 with FWW and CGA, cues for maintaing proper LE position. Pt engaged in wt shifting progressing to marching in place prior to ambulation. Stated she felt somewhat dizzy, BP 98/50. Seated rest. Pt then ambulated short distance ~ 25' with FWW and close CGA, no LOB, c/o increased dizziness therefore limited distance. Pt demonstrated  step through patter, slow kristin. BP 87/51 post ambulation. Sit > supine with CGA. Cues for midline positioning. CAPNO donned and WNL, pillow to maintain proper hip position, pneumatic compression donned. All needs in reach  Barriers to return to prior living situation: A x 1 for mobility, fall history, lives alone but daughter lives in lower level and can assist as needed, dizziness     Recommendations for discharge: Home with assist from daughter for household duties and to provide SBA on stairs, HHPT   Rationale for recommendations: HHPT to progress strength, gait, balance, overall functional mobility as pt below baseline and has fall history          Entered by: Georgette Estrada 10/22/2018 8:45 AM

## 2018-10-22 NOTE — PROVIDER NOTIFICATION
1700  Spoke with Dr Lopez re: patient reports feeling lightheaded. Low BP    Dr Lopez ordered a 500 ml bolus.

## 2018-10-22 NOTE — PROGRESS NOTES
SPIRITUAL HEALTH SERVICES Progress Note  [facility]    Visited per request.     Pt was being visited by family. SH introduced themselves and oriented Pt with  services. Pt didn't have any needs at this time, but requested a prayer.     SH provided a kind and caring presence and prayer.      SH will visit if needs arise.     Harpreet Carballo  Chaplain Resident

## 2018-10-22 NOTE — PROGRESS NOTES
[facility] Progress Note  Izabella Concepcion is a 62 year old female    Today's Date:10/22/2018  Admission Date: 10/21/2018  Fall, initial encounter [W19.XXXA]  Closed fracture of right hip, initial encounter (H) [S72.001A]  POD # 1 Right total hip arthroplasty for femoral neck fracture.    Patient Seen.  Doing well.  Dressings are clean, dry, and intact.  Circulation, motor and sensory function, intact distally.        PLAN:  Deep venous thrombosis prophylaxis - Lovenox while inpatient.  325 ASA for home.  Pain control medication: No changes.  Discharge plan: Home 1-2 days with home PT.      Temp:  [97.2  F (36.2  C)-99.1  F (37.3  C)] 98  F (36.7  C)  Heart Rate:  [64-80] 80  Resp:  [10-19] 16  BP: ()/(50-77) 87/51  SpO2:  [94 %-98 %] 95 %      Results for orders placed or performed during the hospital encounter of 10/21/18 (from the past 24 hour(s))   XR Pelvis w Hip Port Right 1 View    Narrative    PELVIS AND HIP PORTABLE RIGHT ONE VIEW   10/21/2018 3:03 PM     HISTORY: Postop hip arthroplasty.     COMPARISON: 10/21/2018      Impression    IMPRESSION: Right total hip arthroplasty with components in good  position.    IESHA LANIER MD   Glucose by meter   Result Value Ref Range    Glucose 106 (H) 70 - 99 mg/dL   Hemoglobin   Result Value Ref Range    Hemoglobin 9.2 (L) 11.7 - 15.7 g/dL         Bernabe Steiner

## 2018-10-22 NOTE — PROGRESS NOTES
10/22/18 0753   Quick Adds   Type of Visit Initial PT Evaluation   Living Environment   Lives With alone   Living Environment Comment Lives in duplex, adult children (daughter and son in law) live downstairs. Needs to negotiate 6 steps into house    Self-Care   Usual Activity Tolerance good   Current Activity Tolerance fair   Regular Exercise yes   Activity/Exercise Type walking   Functional Level Prior   Ambulation 0-->independent   Transferring 0-->independent   Toileting 0-->independent   Bathing 0-->independent   Dressing 0-->independent   Eating 0-->independent   Communication 0-->understands/communicates without difficulty   Fall history within last six months yes   Prior Functional Level Comment Pt IND however has history of falls, states recently she has had some difficulty on the stairs, feels more weak. States she sometimes trips over objects as well as sometimes feels light headed when getting up    General Information   Onset of Illness/Injury or Date of Surgery - Date 10/21/18   Referring Physician Ivan Padilla MD   Patient/Family Goals Statement To be IND and decrease falls    Pertinent History of Current Problem (include personal factors and/or comorbidities that impact the POC) POD #1 R femoral hip fx s/p R XAVIER, WBAT, posterior precautions, fall history    Precautions/Limitations fall precautions;right hip precautions   Weight-Bearing Status - RLE weight-bearing as tolerated   General Observations Posterior hip precautions   Cognitive Status Examination   Orientation orientation to person, place and time   Integumentary/Edema   Integumentary/Edema Comments Dressing clean/dry no drainage   Posture    Posture Forward head position   Range of Motion (ROM)   ROM Comment WFL   Strength   Strength Comments RLE > 3/5 based on functional mobility. LLE WFL   Bed Mobility   Bed Mobility Comments Supine > sit with CGA to maintain precautions, HOB slightly elevated   Transfer Skills   Transfer Comments STS  "with FWW and CGA from EOB, good speed   Gait   Gait Comments Pt ambulates with FWW and close CGA, step through pattern, slow kristin, downward gaze   Balance   Balance Comments Good static standing balance. Pt requires BUE support on FWW to maintain dynamic balance at this time   Sensory Examination   Sensory Perception Comments Intact   Clinical Impression   Criteria for Skilled Therapeutic Intervention yes, treatment indicated   PT Diagnosis Decreased functional mobility    Influenced by the following impairments pain, weakness, dizziness, POD #1 XAVIER   Functional limitations due to impairments Decreased functional mobility    Clinical Presentation Evolving/Changing   Clinical Presentation Rationale Age, frequent falls, previously IND    Clinical Decision Making (Complexity) Moderate complexity   Therapy Frequency` 2 times/day   Predicted Duration of Therapy Intervention (days/wks) 3 days   Anticipated Discharge Disposition Home with Home Therapy   Risk & Benefits of therapy have been explained Yes   Patient, Family & other staff in agreement with plan of care Yes   [facility] AM-PAC  \"6 Clicks\" V.2 Basic Mobility Inpatient Short Form   1. Turning from your back to your side while in a flat bed without using bedrails? 3 - A Little   2. Moving from lying on your back to sitting on the side of a flat bed without using bedrails? 3 - A Little   3. Moving to and from a bed to a chair (including a wheelchair)? 3 - A Little   4. Standing up from a chair using your arms (e.g., wheelchair, or bedside chair)? 3 - A Little   5. To walk in hospital room? 3 - A Little   6. Climbing 3-5 steps with a railing? 3 - A Little   Basic Mobility Raw Score (Score out of 24.Lower scores equate to lower levels of function) 18   Total Evaluation Time   Total Evaluation Time (Minutes) 15     "

## 2018-10-22 NOTE — PROGRESS NOTES
[facility]    Internal Medicine Hospitalist Progress Note  10/22/2018  I evaluated patient on the above date.    Dajuan Luevano Jr., MD  [phone] (p)  Text Page      Assessment & Plan   Izabella Concepcion is a 62 year old female with history of questionable asthma who presented 10/21 with mechanical fall and found to have a R hip fracture.      Mechnical fall with transverse fracture of femoral neck - s/p right total hip arthroplasty 10/21/2018 .  Presented after tripping over some pillows landing on the right side of her hip with subsequent pain. Denied any loss of consciousness or associated neurologic or cardiopulmonary symptoms with her fall.  Orthopedic surgery consulted and underwent above surgery.  - Routine postoperative cares per orthopedic surgery service.  - Continue PRN acetaminophen, PRN PO Dilaudid, PRN IV Dilaudid.  - Continue PT, OT.    Acute blood loss anemia.  Hgb 12.7 on admit 10/21.   Recent Labs  Lab 10/22/18  0655 10/21/18  0858   HGB 9.2* 12.7   - Monitor CBC.  - Consider prbc transfusion if hgb </= 7.0 or if significant bleeding with hemodynamic instability or if symptomatic.    Hypotension, suspect related to medications and hypovolemia.  SBP's in 80's 10/22 with some dizziness. Hgb 9.2 on 10/22.  - Given 500 ml NS bolus.    Asthma, question allergy-induced.  Reportedly had been diagnosed with asthma in adulthood, she eventually went to [place] and underwent nitric oxide testing, eventually underwent treatments which she cannot recall and she reports since that time she has not needed any asthma medications. She attributes her symptoms in part due to allergies as she was using a comforter that had dust mites.  - Continue PRN albuterol nebulizer.     Hyperlipidemia.  Noted to have statin intolerance. Not currently on medications.     Prophylaxis.  - PCD's, ambulation.  - Lovenox.    CODE STATUS: FULL.    Dispo.  - Pending above.    # Pain Assessment:  Current Pain Score 10/22/2018  "  Patient currently in pain? denies   Pain score (0-10) -   Pain location -   Pain descriptors -   Izabella s pain level was assessed and she currently denies pain.        Interval History   Doing better today.  Was dizzy earlier with low BP.  Denies chest pain or SOB.   Tolerating diet.    -Data reviewed today: I reviewed all new labs and imaging over the last 24 hours. I personally reviewed no images or EKG's today.    Physical Exam   Heart Rate: 78, Blood pressure (!) 83/50, pulse 75, temperature 98.3  F (36.8  C), temperature source Oral, resp. rate 16, height 1.6 m (5' 3\"), weight 70.3 kg (155 lb), SpO2 91 %, not currently breastfeeding.  Vitals:    10/21/18 0820   Weight: 70.3 kg (155 lb)     Vital Signs with Ranges  Temp:  [97.6  F (36.4  C)-98.3  F (36.8  C)] 98.3  F (36.8  C)  Heart Rate:  [65-80] 78  Resp:  [14-19] 16  BP: ()/(50-61) 83/50  SpO2:  [91 %-98 %] 91 %  Patient Vitals for the past 24 hrs:   BP Temp Temp src Heart Rate Resp SpO2   10/22/18 1520 (!) 83/50 98.3  F (36.8  C) Oral 78 16 91 %   10/22/18 1430 - - - - 16 -   10/22/18 1339 - - - - 16 -   10/22/18 0834 (!) 87/51 - - - - -   10/22/18 0800 - - - - 16 -   10/22/18 0741 94/57 98  F (36.7  C) Oral 80 14 95 %   10/22/18 0720 - - - - 16 -   10/22/18 0013 90/50 98.3  F (36.8  C) Oral 78 14 98 %   10/21/18 2116 96/50 97.6  F (36.4  C) Oral 74 16 96 %   10/21/18 2102 - - - - 19 -   10/21/18 2053 98/56 - - 73 - -   10/1955 102/55 - - 69 - 96 %   10/21/18 1835 99/58 - - 67 15 97 %   10/21/18 1820 94/54 - - 69 - -   10/21/18 1750 102/53 - - 65 - -   10/21/18 1720 95/57 - - 80 - -   10/21/18 1658 - - - - 15 -   10/21/18 1650 (!) 89/61 - - 70 - -     I/O's Last 24 hours  I/O last 3 completed shifts:  In: 1329 [P.O.:300; I.V.:1029]  Out: 2075 [Urine:2075]    Constitutional: Alert, oriented, pleasant.  Respiratory: Diminished in bases. No crackles or wheezes.  Cardiovascular: RRR no m/r/g.  GI: Soft, nt, nd, +BS.  Skin/Integumen:   Other:  "       Data     Recent Labs  Lab 10/22/18  0655 10/21/18  0858   WBC  --  8.1   HGB 9.2* 12.7   MCV  --  87   PLT  --  241   INR  --  0.96   NA  --  141   POTASSIUM  --  3.8   CHLORIDE  --  108   CO2  --  25   BUN  --  11   CR  --  0.58   ANIONGAP  --  8   BRITTANY  --  8.1*   GLC  --  117*   ALBUMIN  --  3.5   PROTTOTAL  --  6.6*   BILITOTAL  --  0.3   ALKPHOS  --  63   ALT  --  17   AST  --  21     Recent Labs   Lab Test  10/22/18   0603  10/21/18   0858  11/30/17   1105  06/27/17   1701   GLC   --   117*  92  88   BGM  106*   --    --    --          No results found for this or any previous visit (from the past 24 hour(s)).    Medications   All medications were reviewed.    lactated ringers 100 mL/hr at 10/22/18 0354       sodium chloride 0.9%  500 mL Intravenous Once     acetaminophen  975 mg Oral Q8H     enoxaparin  40 mg Subcutaneous Q24H     senna-docusate  1 tablet Oral BID    Or     senna-docusate  2 tablet Oral BID     sodium chloride (PF)  3 mL Intracatheter Q8H

## 2018-10-22 NOTE — PLAN OF CARE
Problem: Patient Care Overview  Goal: Plan of Care/Patient Progress Review  Outcome: Improving  A&Ox4. Has not gotten out of bed. VSS on 2 LPM except BP soft, which is baseline per pt & she is asymptomatic. Vazquez patent with adequate output. IVF infusing. Capno on. CMS intact, right hip dressing is CDI.  Toradol & dilaudid for pain management. New complaint of back pain, attempted repositioning but was not effective. Has had mild nausea.

## 2018-10-22 NOTE — PLAN OF CARE
Problem: Patient Care Overview  Goal: Plan of Care/Patient Progress Review  Patient arrived to the floor at 1600. Alert and oriented x 4. Soft BP. Lightheadedness resolved. On 2 L oxygen. Refused to sit on edge of the bed due to nausea. Zofran helped. Dressing cdi, cms intact. Vazquez out put 225.   Toradol and IV dilaudid for pain.     Oxycodone discontinued due to hx of nausea while on it. PO dilaudid ordered

## 2018-10-22 NOTE — PLAN OF CARE
Problem: Patient Care Overview  Goal: Plan of Care/Patient Progress Review  Outcome: Improving  A/Ox4. Johanna, Walker, GB. IVSL. Hip precautions. Dilaudid for pain. Continue to monitor.

## 2018-10-23 ENCOUNTER — APPOINTMENT (OUTPATIENT)
Dept: PHYSICAL THERAPY | Facility: CLINIC | Age: 63
End: 2018-10-23
Payer: COMMERCIAL

## 2018-10-23 ENCOUNTER — APPOINTMENT (OUTPATIENT)
Dept: OCCUPATIONAL THERAPY | Facility: CLINIC | Age: 63
End: 2018-10-23
Attending: ORTHOPAEDIC SURGERY
Payer: COMMERCIAL

## 2018-10-23 ENCOUNTER — APPOINTMENT (OUTPATIENT)
Dept: GENERAL RADIOLOGY | Facility: CLINIC | Age: 63
End: 2018-10-23
Attending: INTERNAL MEDICINE
Payer: COMMERCIAL

## 2018-10-23 LAB
ALBUMIN UR-MCNC: NEGATIVE MG/DL
ANION GAP SERPL CALCULATED.3IONS-SCNC: 5 MMOL/L (ref 3–14)
APPEARANCE UR: CLEAR
BASOPHILS # BLD AUTO: 0 10E9/L (ref 0–0.2)
BASOPHILS NFR BLD AUTO: 0.3 %
BILIRUB UR QL STRIP: NEGATIVE
BUN SERPL-MCNC: 11 MG/DL (ref 7–30)
CALCIUM SERPL-MCNC: 7.8 MG/DL (ref 8.5–10.1)
CHLORIDE SERPL-SCNC: 106 MMOL/L (ref 94–109)
CO2 SERPL-SCNC: 27 MMOL/L (ref 20–32)
COLOR UR AUTO: ABNORMAL
CREAT SERPL-MCNC: 0.56 MG/DL (ref 0.52–1.04)
DIFFERENTIAL METHOD BLD: ABNORMAL
EOSINOPHIL # BLD AUTO: 0.2 10E9/L (ref 0–0.7)
EOSINOPHIL NFR BLD AUTO: 2.9 %
ERYTHROCYTE [DISTWIDTH] IN BLOOD BY AUTOMATED COUNT: 13.3 % (ref 10–15)
GFR SERPL CREATININE-BSD FRML MDRD: >90 ML/MIN/1.7M2
GLUCOSE SERPL-MCNC: 88 MG/DL (ref 70–99)
GLUCOSE UR STRIP-MCNC: NEGATIVE MG/DL
HCT VFR BLD AUTO: 25.3 % (ref 35–47)
HGB BLD-MCNC: 8.2 G/DL (ref 11.7–15.7)
HGB BLD-MCNC: 8.5 G/DL (ref 11.7–15.7)
HGB UR QL STRIP: NEGATIVE
IMM GRANULOCYTES # BLD: 0 10E9/L (ref 0–0.4)
IMM GRANULOCYTES NFR BLD: 0.4 %
KETONES UR STRIP-MCNC: 10 MG/DL
LEUKOCYTE ESTERASE UR QL STRIP: NEGATIVE
LYMPHOCYTES # BLD AUTO: 1.3 10E9/L (ref 0.8–5.3)
LYMPHOCYTES NFR BLD AUTO: 16 %
MCH RBC QN AUTO: 28.8 PG (ref 26.5–33)
MCHC RBC AUTO-ENTMCNC: 32.4 G/DL (ref 31.5–36.5)
MCV RBC AUTO: 89 FL (ref 78–100)
MONOCYTES # BLD AUTO: 1.2 10E9/L (ref 0–1.3)
MONOCYTES NFR BLD AUTO: 14.4 %
NEUTROPHILS # BLD AUTO: 5.3 10E9/L (ref 1.6–8.3)
NEUTROPHILS NFR BLD AUTO: 66 %
NITRATE UR QL: NEGATIVE
NRBC # BLD AUTO: 0 10*3/UL
NRBC BLD AUTO-RTO: 0 /100
PH UR STRIP: 5.5 PH (ref 5–7)
PLATELET # BLD AUTO: 175 10E9/L (ref 150–450)
POTASSIUM SERPL-SCNC: 3.8 MMOL/L (ref 3.4–5.3)
RBC # BLD AUTO: 2.85 10E12/L (ref 3.8–5.2)
SODIUM SERPL-SCNC: 138 MMOL/L (ref 133–144)
SOURCE: ABNORMAL
SP GR UR STRIP: 1 (ref 1–1.03)
UROBILINOGEN UR STRIP-MCNC: NORMAL MG/DL (ref 0–2)
WBC # BLD AUTO: 8 10E9/L (ref 4–11)

## 2018-10-23 PROCEDURE — 25000132 ZZH RX MED GY IP 250 OP 250 PS 637: Performed by: ORTHOPAEDIC SURGERY

## 2018-10-23 PROCEDURE — 87086 URINE CULTURE/COLONY COUNT: CPT | Performed by: INTERNAL MEDICINE

## 2018-10-23 PROCEDURE — 36415 COLL VENOUS BLD VENIPUNCTURE: CPT | Performed by: INTERNAL MEDICINE

## 2018-10-23 PROCEDURE — 71045 X-RAY EXAM CHEST 1 VIEW: CPT

## 2018-10-23 PROCEDURE — 40000193 ZZH STATISTIC PT WARD VISIT: Performed by: PHYSICAL THERAPIST

## 2018-10-23 PROCEDURE — 12000007 ZZH R&B INTERMEDIATE

## 2018-10-23 PROCEDURE — 97530 THERAPEUTIC ACTIVITIES: CPT | Mod: GO | Performed by: OCCUPATIONAL THERAPIST

## 2018-10-23 PROCEDURE — 97535 SELF CARE MNGMENT TRAINING: CPT | Mod: GO | Performed by: OCCUPATIONAL THERAPIST

## 2018-10-23 PROCEDURE — 81003 URINALYSIS AUTO W/O SCOPE: CPT | Performed by: INTERNAL MEDICINE

## 2018-10-23 PROCEDURE — 85018 HEMOGLOBIN: CPT | Performed by: INTERNAL MEDICINE

## 2018-10-23 PROCEDURE — 40000133 ZZH STATISTIC OT WARD VISIT: Performed by: OCCUPATIONAL THERAPIST

## 2018-10-23 PROCEDURE — 97110 THERAPEUTIC EXERCISES: CPT | Mod: GP | Performed by: PHYSICAL THERAPIST

## 2018-10-23 PROCEDURE — 25000132 ZZH RX MED GY IP 250 OP 250 PS 637: Performed by: PHYSICIAN ASSISTANT

## 2018-10-23 PROCEDURE — 80048 BASIC METABOLIC PNL TOTAL CA: CPT | Performed by: INTERNAL MEDICINE

## 2018-10-23 PROCEDURE — 97116 GAIT TRAINING THERAPY: CPT | Mod: GP | Performed by: PHYSICAL THERAPIST

## 2018-10-23 PROCEDURE — 85025 COMPLETE CBC W/AUTO DIFF WBC: CPT | Performed by: INTERNAL MEDICINE

## 2018-10-23 PROCEDURE — 97166 OT EVAL MOD COMPLEX 45 MIN: CPT | Mod: GO | Performed by: OCCUPATIONAL THERAPIST

## 2018-10-23 PROCEDURE — 99233 SBSQ HOSP IP/OBS HIGH 50: CPT | Performed by: INTERNAL MEDICINE

## 2018-10-23 PROCEDURE — 25000128 H RX IP 250 OP 636: Performed by: ORTHOPAEDIC SURGERY

## 2018-10-23 RX ORDER — HYDROMORPHONE HYDROCHLORIDE 2 MG/1
2 TABLET ORAL EVERY 4 HOURS PRN
Qty: 20 TABLET | Refills: 0 | Status: SHIPPED | OUTPATIENT
Start: 2018-10-23 | End: 2018-11-07

## 2018-10-23 RX ORDER — AMOXICILLIN 250 MG
2 CAPSULE ORAL 2 TIMES DAILY
Qty: 40 TABLET | Refills: 0 | Status: SHIPPED | OUTPATIENT
Start: 2018-10-23 | End: 2018-11-07

## 2018-10-23 RX ORDER — ASPIRIN 325 MG
325 TABLET ORAL DAILY
Qty: 30 TABLET | Refills: 0 | Status: SHIPPED | OUTPATIENT
Start: 2018-10-23 | End: 2021-05-28

## 2018-10-23 RX ORDER — ACETAMINOPHEN 325 MG/1
650 TABLET ORAL EVERY 4 HOURS PRN
Qty: 40 TABLET | Refills: 0 | Status: SHIPPED | OUTPATIENT
Start: 2018-10-24 | End: 2018-11-07

## 2018-10-23 RX ORDER — ONDANSETRON 4 MG/1
4 TABLET, ORALLY DISINTEGRATING ORAL EVERY 6 HOURS PRN
Qty: 10 TABLET | Refills: 0 | Status: SHIPPED | OUTPATIENT
Start: 2018-10-23 | End: 2018-11-07

## 2018-10-23 RX ADMIN — SENNOSIDES AND DOCUSATE SODIUM 2 TABLET: 8.6; 5 TABLET ORAL at 08:25

## 2018-10-23 RX ADMIN — ACETAMINOPHEN 975 MG: 325 TABLET, FILM COATED ORAL at 16:14

## 2018-10-23 RX ADMIN — ENOXAPARIN SODIUM 40 MG: 40 INJECTION SUBCUTANEOUS at 08:27

## 2018-10-23 RX ADMIN — SENNOSIDES AND DOCUSATE SODIUM 2 TABLET: 8.6; 5 TABLET ORAL at 22:22

## 2018-10-23 RX ADMIN — ACETAMINOPHEN 975 MG: 325 TABLET, FILM COATED ORAL at 00:14

## 2018-10-23 RX ADMIN — HYDROMORPHONE HYDROCHLORIDE 2 MG: 2 TABLET ORAL at 08:24

## 2018-10-23 RX ADMIN — ACETAMINOPHEN 975 MG: 325 TABLET, FILM COATED ORAL at 08:24

## 2018-10-23 ASSESSMENT — ACTIVITIES OF DAILY LIVING (ADL)
ADLS_ACUITY_SCORE: 9
PREVIOUS_RESPONSIBILITIES: MEAL PREP;HOUSEKEEPING;LAUNDRY;SHOPPING;MEDICATION MANAGEMENT;FINANCES
ADLS_ACUITY_SCORE: 9

## 2018-10-23 NOTE — PLAN OF CARE
Problem: Patient Care Overview  Goal: Plan of Care/Patient Progress Review  OT order received.  Evaluation completed, treatment initiated.  Patient is a 62 year old female who is s/p R XAVIER.  She lives alone, with daughter living in  of same duplex.  She reports being independent with all ADLs prior to surgery. She has a tub/shower combo with glass doors.  She states her daughter will be there for a few days after discharge to assist.    Discharge Planner OT   Patient plan for discharge: home with daughter to assist  Current status: Pt able to state 1/3 hip precautions, reviewed all.  Pt sat EOB for education of AE with LE dressing, and she completed with SBA. Pt feeling light-headed and nauseas, so returned to supine with Rocio.  BP taken 104/51. Pt feeling better once supine.  Educated in possible DME for toilet and tub, will continue with recommendations next session.  Barriers to return to prior living situation: pain, hip precautions, dizziness, lives alone, poor activity tolerance  Recommendations for discharge: home with daughter to assist with tub transfer, home chores  Rationale for recommendations: patient progressing, anticipate she will be able to discharge to home with daughter to assist.        Entered by: OMAR TAN 10/23/2018 9:08 AM

## 2018-10-23 NOTE — PLAN OF CARE
Problem: Patient Care Overview  Goal: Plan of Care/Patient Progress Review  Outcome: Improving  Patient up with SBA and walker. Adequate I/O. Pain managed with PO meds. Unable to wean patient off 1L O2.

## 2018-10-23 NOTE — PLAN OF CARE
Problem: Patient Care Overview  Goal: Plan of Care/Patient Progress Review  Pt A/O x4. CMS intact. BP slightly soft, but WDL. Otherwise VSS on 1L O2. Abduction pillow used all night. Up A1/SBA to the BR; voiding adequately. IV SL. Taking PO dilaudid for pain. Pt slept very well overnight. Continue to monitor.

## 2018-10-23 NOTE — PROGRESS NOTES
[facility]    Internal Medicine Hospitalist Progress Note  10/23/2018  I evaluated patient on the above date.    Dajuan Luevano Jr., MD  [phone] (p)  Text Page      Assessment & Plan   Izabella Concepcion is a 62 year old female with history of questionable asthma who presented 10/21 with mechanical fall and found to have a R hip fracture.      Mechnical fall with transverse fracture of femoral neck - s/p right total hip arthroplasty 10/21/2018 .  Presented after tripping over some pillows landing on the right side of her hip with subsequent pain. Denied any loss of consciousness or associated neurologic or cardiopulmonary symptoms with her fall.  Orthopedic surgery consulted and underwent above surgery.  - Routine postoperative cares per orthopedic surgery service.  - Continue PRN acetaminophen, PRN PO Dilaudid, PRN IV Dilaudid.  - Continue PT, OT.    Fever, suspect post-op related.  Temp to 100.7 early this am. Has cough with spirometry but otherwise no productive cough.  - Check CXR and UA/UC.    Hypoxia, possibly due to atelectasis.  Needing O2 10/23.  Has cough with spirometry but otherwise no productive cough.  - Check CXR as above.  - Continue IS.    Dizziness, ?medication effect or hypovolemia/anemia.  Dizzy when standing 10/22 and 10/23, even with normal BP.  - Monitor.  - Consider prbc transfusion if persistent and limiting ability to tolerate therapies/ambulation.    Acute blood loss anemia.  Hgb 12.7 on admit 10/21.   Recent Labs  Lab 10/23/18  0724 10/22/18  0655 10/21/18  0858   HGB 8.2* 9.2* 12.7   - Monitor CBC.  - Consider prbc transfusion if hgb </= 7.0 or if significant bleeding with hemodynamic instability or if symptomatic.    Hypotension, suspect related to medications and hypovolemia (resolved).  SBP's in 80's 10/22 with some dizziness. Hgb 9.2 on 10/22. Improved with fluids/boluses. BP's stable 10/23.  - Monitor.    Asthma, question allergy-induced.  Reportedly had been  "diagnosed with asthma in adulthood, she eventually went to [place] and underwent nitric oxide testing, eventually underwent treatments which she cannot recall and she reports since that time she has not needed any asthma medications. She attributes her symptoms in part due to allergies as she was using a comforter that had dust mites.  - Continue PRN albuterol nebulizer.     Hyperlipidemia.  Noted to have statin intolerance. Not currently on medications.     Prophylaxis.  - PCD's, ambulation.  - Lovenox.    CODE STATUS: FULL.    Dispo.  - Pending above.    # Pain Assessment:  Current Pain Score 10/23/2018   Patient currently in pain? -   Pain score (0-10) 4   Pain location -   Pain descriptors -   Izabella nagy pain level was assessed and she currently denies pain.        Interval History   Had a rough morning, better now. Dizzy when standing. Has cough with spirometry, otherwise no coughing. NO chest pain. Denies dyspnea..    -Data reviewed today: I reviewed all new labs and imaging over the last 24 hours. I personally reviewed no images or EKG's today.    Physical Exam   Heart Rate: 79, Blood pressure 116/68, pulse 93, temperature 99  F (37.2  C), temperature source Oral, resp. rate 16, height 1.6 m (5' 3\"), weight 70.3 kg (155 lb), SpO2 92 %, not currently breastfeeding.  Vitals:    10/21/18 0820   Weight: 70.3 kg (155 lb)     Vital Signs with Ranges  Temp:  [98.2  F (36.8  C)-100.7  F (38.2  C)] 99  F (37.2  C)  Pulse:  [93] 93  Heart Rate:  [78-96] 79  Resp:  [14-16] 16  BP: ()/(50-68) 116/68  SpO2:  [86 %-100 %] 92 %  Patient Vitals for the past 24 hrs:   BP Temp Temp src Pulse Heart Rate Resp SpO2   10/23/18 1012 116/68 - - - 79 - -   10/23/18 0835 104/51 - - - - - 92 %   10/23/18 0834 - - - - - - (!) 86 %   10/23/18 0745 102/61 99  F (37.2  C) Oral 93 88 16 100 %   10/23/18 0452 97/56 98.2  F (36.8  C) Oral - 80 16 95 %   10/23/18 0011 99/55 100.7  F (38.2  C) Oral - 96 16 94 %   10/22/18 3174 - - - - - 14 - "   10/22/18 2241 - - - - - 15 -   10/22/18 1834 104/59 98.8  F (37.1  C) Oral - 90 15 90 %   10/22/18 1520 (!) 83/50 98.3  F (36.8  C) Oral - 78 16 91 %   10/22/18 1430 - - - - - 16 -   10/22/18 1339 - - - - - 16 -     I/O's Last 24 hours  I/O last 3 completed shifts:  In: 875 [P.O.:875]  Out: 700 [Urine:700]    Constitutional: Alert, oriented, pleasant.  Respiratory: Diminished in bases. No crackles or wheezes.  Cardiovascular: RRR no m/r/g.  GI: Soft, nt, nd, +BS.  Skin/Integumen: No significant leg edema.  Other:        Data     Recent Labs  Lab 10/23/18  0724 10/22/18  0655 10/21/18  0858   WBC 8.0  --  8.1   HGB 8.2* 9.2* 12.7   MCV 89  --  87     --  241   INR  --   --  0.96     --  141   POTASSIUM 3.8  --  3.8   CHLORIDE 106  --  108   CO2 27  --  25   BUN 11  --  11   CR 0.56  --  0.58   ANIONGAP 5  --  8   BRITTANY 7.8*  --  8.1*   GLC 88  --  117*   ALBUMIN  --   --  3.5   PROTTOTAL  --   --  6.6*   BILITOTAL  --   --  0.3   ALKPHOS  --   --  63   ALT  --   --  17   AST  --   --  21     Recent Labs   Lab Test  10/23/18   0724  10/22/18   0603  10/21/18   0858  11/30/17   1105  06/27/17   1701   GLC  88   --   117*  92  88   BGM   --   106*   --    --    --          No results found for this or any previous visit (from the past 24 hour(s)).    Medications   All medications were reviewed.    lactated ringers 100 mL/hr at 10/22/18 1719       acetaminophen  975 mg Oral Q8H     enoxaparin  40 mg Subcutaneous Q24H     senna-docusate  1 tablet Oral BID    Or     senna-docusate  2 tablet Oral BID     sodium chloride (PF)  3 mL Intracatheter Q8H

## 2018-10-23 NOTE — PROGRESS NOTES
10/23/18 0800   Quick Adds   Type of Visit Initial Occupational Therapy Evaluation   Living Environment   Lives With alone   Living Arrangements house  (upper level of duplex)   Home Accessibility tub/shower is not walk in   Living Environment Comment Daughter lives in lower level of duplex; has 6 stairs to enter   Self-Care   Usual Activity Tolerance good   Current Activity Tolerance moderate   Regular Exercise yes   Activity/Exercise Type walking   Equipment Currently Used at Home none   Functional Level Prior   Ambulation 0-->independent   Transferring 0-->independent   Toileting 0-->independent   Bathing 0-->independent   Dressing 0-->independent   Eating 0-->independent   Communication 0-->understands/communicates without difficulty   Swallowing 0-->swallows foods/liquids without difficulty   Cognition 0 - no cognition issues reported   Fall history within last six months yes   Number of times patient has fallen within last six months 1   Which of the above functional risks had a recent onset or change? transferring   Prior Functional Level Comment Pt reports being independent with all ADLs prior to surgery   General Information   Onset of Illness/Injury or Date of Surgery - Date 10/21/18   Referring Physician Ivan Cash   Patient/Family Goals Statement home with daughter to assist   Additional Occupational Profile Info/Pertinent History of Current Problem Pt is s/p R XAVIER, P or P-L exposure   Precautions/Limitations fall precautions;right hip precautions   Weight-Bearing Status - LLE full weight-bearing   Weight-Bearing Status - RLE weight-bearing as tolerated   General Observations Pt just returning to bed due to fatigue, agreeable to sit EOB with OT for session   General Info Comments Activity order: ambulate with assist 3x dailly   Cognitive Status Examination   Cognitive Comment No cognitive concerns   Sensory Examination   Sensory Quick Adds No deficits were identified   Pain Assessment   Patient  Currently in Pain Yes, see Vital Sign flowsheet   Integumentary/Edema   Integumentary/Edema Comments surgical site with dressing   Posture   Posture forward head position;protracted shoulders   Range of Motion (ROM)   ROM Comment Pt demonstrated functional BUE AROM during bed mobility, dressing task   Strength   Strength Comments Pt demonstrated functional BUE strength during bed mobility, dressing task   Mobility   Bed Mobility Bed mobility skill: Sit to supine;Bed mobility skill: Scooting/Bridging   Bed Mobility Skill: Scooting/Bridging   Level of [place]: Scooting/Bridging stand-by assist   Physical Assist/Nonphysical Assist: Scooting/Bridging verbal cues;set-up required   Bed Mobility Skill: Sit to Supine   Level of [place]: Sit/Supine minimum assist (75% patients effort)   Physical Assist/Nonphysical Assist: Sit/Supine 1 person assist;verbal cues   Transfer Skill: Bed to Chair/Chair to Bed   Level of [place]: Bed to Chair stand-by assist   Physical Assist/Nonphysical Assist: Bed to Chair supervision;verbal cues;set-up required   Weight-Bearing Restrictions weight-bearing as tolerated   Assistive Device - Transfer Skill Bed to Chair Chair to Bed Rehab Eval standard walker   Transfer Skill: Sit to Stand   Level of [place]: Sit/Stand stand-by assist   Physical Assist/Nonphysical Assist: Sit/Stand supervision;set-up required;verbal cues   Transfer Skill: Sit to Stand weight-bearing as tolerated   Assistive Device for Transfer: Sit/Stand standard walker   Tub/Shower Transfer   Tub/Shower Transfer Comments Pt has tub/shower combo with glass doors   Balance   Balance Comments Good seated, good standing at EOB with 2WW   Lower Body Dressing   Level of [place]: Dress Lower Body moderate assist (50% patients effort)   Physical Assist/Nonphysical Assist: Dress Lower Body 1 person assist;verbal cues;supervision;set-up required   Assistive Device long-handled shoe horn;reacher;sock-aid  "  Instrumental Activities of Daily Living (IADL)   Previous Responsibilities meal prep;housekeeping;laundry;shopping;medication management;finances   IADL Comments daughter can assist as needed   Activities of Daily Living Analysis   Impairments Contributing to Impaired Activities of Daily Living pain;post surgical precautions;strength decreased   General Therapy Interventions   Planned Therapy Interventions ADL retraining;transfer training   Clinical Impression   Criteria for Skilled Therapeutic Interventions Met yes, treatment indicated   OT Diagnosis impaired ADLs   Influenced by the following impairments post-op pain, weakness, precautions   Assessment of Occupational Performance 3-5 Performance Deficits   Identified Performance Deficits dressing, bathing, toileting, home chores   Clinical Decision Making (Complexity) Moderate complexity   Therapy Frequency daily   Predicted Duration of Therapy Intervention (days/wks) 3 days   Anticipated Equipment Needs at Discharge dressing equipment;reacher;shower chair;raised toilet seat   Anticipated Discharge Disposition Home with Assist   Risks and Benefits of Treatment have been explained. Yes   Patient, Family & other staff in agreement with plan of care Yes   [facility] AM-PAC  \"6 Clicks\" Daily Activity Inpatient Short Form   1. Putting on and taking off regular lower body clothing? 3 - A Little   2. Bathing (including washing, rinsing, drying)? 2 - A Lot   3. Toileting, which includes using toilet, bedpan or urinal? 3 - A Little   4. Putting on and taking off regular upper body clothing? 3 - A Little   5. Taking care of personal grooming such as brushing teeth? 4 - None   6. Eating meals? 4 - None   Daily Activity Raw Score (Score out of 24.Lower scores equate to lower levels of function) 19   Total Evaluation Time   Total Evaluation Time (Minutes) 8     "

## 2018-10-23 NOTE — PLAN OF CARE
Problem: Patient Care Overview  Goal: Plan of Care/Patient Progress Review  Discharge Planner PT   Patient plan for discharge: Home with assist from daughter and Home PT  Current status: Patient reported R hip pain of 1/10 at rest. Patient performed supine <> sit with Reva to guide R LE on/off bed. Sit <> stand and ambulation of 10 feet x 2 with FWW and CGA. Deferred further ambulation due to patient report of increased dizziness. BP in sittin/46.   Barriers to return to prior living situation: Dizziness, Pain, Level of assist   Recommendations for discharge: Return home with assist from daughter and Home PT  Rationale for recommendations: Pt is below baseline in regards to mobility and will benefit from Home PT. Pt will benefit from assist for all mobility upon return home.        Entered by: Marialuisa Collins 10/23/2018 1:09 PM

## 2018-10-23 NOTE — PLAN OF CARE
Problem: Patient Care Overview  Goal: Plan of Care/Patient Progress Review  BP improved after bolus 500 ml. 95% on 1 L oxygen. 89% on room air. IS and deep breathing encouraged. Alert and oriented x 4. Dressing cdi, cms intact. Up with SBA. Voids in bathroom. PO dilaudid and tylenol for pain.  Ambulated in hallway

## 2018-10-24 ENCOUNTER — APPOINTMENT (OUTPATIENT)
Dept: PHYSICAL THERAPY | Facility: CLINIC | Age: 63
End: 2018-10-24
Payer: COMMERCIAL

## 2018-10-24 ENCOUNTER — APPOINTMENT (OUTPATIENT)
Dept: OCCUPATIONAL THERAPY | Facility: CLINIC | Age: 63
End: 2018-10-24
Payer: COMMERCIAL

## 2018-10-24 VITALS
SYSTOLIC BLOOD PRESSURE: 115 MMHG | HEART RATE: 73 BPM | RESPIRATION RATE: 16 BRPM | WEIGHT: 155 LBS | OXYGEN SATURATION: 96 % | HEIGHT: 63 IN | TEMPERATURE: 97.8 F | DIASTOLIC BLOOD PRESSURE: 65 MMHG | BODY MASS INDEX: 27.46 KG/M2

## 2018-10-24 LAB
ANION GAP SERPL CALCULATED.3IONS-SCNC: 5 MMOL/L (ref 3–14)
BACTERIA SPEC CULT: NORMAL
BACTERIA SPEC CULT: NORMAL
BASOPHILS # BLD AUTO: 0 10E9/L (ref 0–0.2)
BASOPHILS NFR BLD AUTO: 0.3 %
BUN SERPL-MCNC: 7 MG/DL (ref 7–30)
CALCIUM SERPL-MCNC: 8.2 MG/DL (ref 8.5–10.1)
CHLORIDE SERPL-SCNC: 107 MMOL/L (ref 94–109)
CO2 SERPL-SCNC: 29 MMOL/L (ref 20–32)
CREAT SERPL-MCNC: 0.58 MG/DL (ref 0.52–1.04)
DIFFERENTIAL METHOD BLD: ABNORMAL
EOSINOPHIL # BLD AUTO: 0.2 10E9/L (ref 0–0.7)
EOSINOPHIL NFR BLD AUTO: 2.7 %
ERYTHROCYTE [DISTWIDTH] IN BLOOD BY AUTOMATED COUNT: 13 % (ref 10–15)
GFR SERPL CREATININE-BSD FRML MDRD: >90 ML/MIN/1.7M2
GLUCOSE SERPL-MCNC: 84 MG/DL (ref 70–99)
HCT VFR BLD AUTO: 24.5 % (ref 35–47)
HGB BLD-MCNC: 8.1 G/DL (ref 11.7–15.7)
IMM GRANULOCYTES # BLD: 0 10E9/L (ref 0–0.4)
IMM GRANULOCYTES NFR BLD: 0.1 %
LYMPHOCYTES # BLD AUTO: 1.8 10E9/L (ref 0.8–5.3)
LYMPHOCYTES NFR BLD AUTO: 25.4 %
Lab: NORMAL
MCH RBC QN AUTO: 29.2 PG (ref 26.5–33)
MCHC RBC AUTO-ENTMCNC: 33.1 G/DL (ref 31.5–36.5)
MCV RBC AUTO: 88 FL (ref 78–100)
MONOCYTES # BLD AUTO: 1 10E9/L (ref 0–1.3)
MONOCYTES NFR BLD AUTO: 14.4 %
NEUTROPHILS # BLD AUTO: 4.1 10E9/L (ref 1.6–8.3)
NEUTROPHILS NFR BLD AUTO: 57.1 %
NRBC # BLD AUTO: 0 10*3/UL
NRBC BLD AUTO-RTO: 0 /100
PLATELET # BLD AUTO: 178 10E9/L (ref 150–450)
POTASSIUM SERPL-SCNC: 3.7 MMOL/L (ref 3.4–5.3)
RBC # BLD AUTO: 2.77 10E12/L (ref 3.8–5.2)
SODIUM SERPL-SCNC: 141 MMOL/L (ref 133–144)
SPECIMEN SOURCE: NORMAL
WBC # BLD AUTO: 7.1 10E9/L (ref 4–11)

## 2018-10-24 PROCEDURE — 85025 COMPLETE CBC W/AUTO DIFF WBC: CPT | Performed by: INTERNAL MEDICINE

## 2018-10-24 PROCEDURE — 25000132 ZZH RX MED GY IP 250 OP 250 PS 637: Performed by: ORTHOPAEDIC SURGERY

## 2018-10-24 PROCEDURE — 97116 GAIT TRAINING THERAPY: CPT | Mod: GP

## 2018-10-24 PROCEDURE — 97530 THERAPEUTIC ACTIVITIES: CPT | Mod: GO | Performed by: OCCUPATIONAL THERAPY ASSISTANT

## 2018-10-24 PROCEDURE — 25000128 H RX IP 250 OP 636: Performed by: ORTHOPAEDIC SURGERY

## 2018-10-24 PROCEDURE — 40000193 ZZH STATISTIC PT WARD VISIT

## 2018-10-24 PROCEDURE — 99239 HOSP IP/OBS DSCHRG MGMT >30: CPT | Performed by: INTERNAL MEDICINE

## 2018-10-24 PROCEDURE — 36415 COLL VENOUS BLD VENIPUNCTURE: CPT | Performed by: INTERNAL MEDICINE

## 2018-10-24 PROCEDURE — 80048 BASIC METABOLIC PNL TOTAL CA: CPT | Performed by: INTERNAL MEDICINE

## 2018-10-24 PROCEDURE — 97110 THERAPEUTIC EXERCISES: CPT | Mod: GP

## 2018-10-24 PROCEDURE — 40000133 ZZH STATISTIC OT WARD VISIT: Performed by: OCCUPATIONAL THERAPY ASSISTANT

## 2018-10-24 PROCEDURE — 97535 SELF CARE MNGMENT TRAINING: CPT | Mod: GO | Performed by: OCCUPATIONAL THERAPY ASSISTANT

## 2018-10-24 RX ADMIN — ENOXAPARIN SODIUM 40 MG: 40 INJECTION SUBCUTANEOUS at 08:33

## 2018-10-24 RX ADMIN — ACETAMINOPHEN 975 MG: 325 TABLET, FILM COATED ORAL at 01:25

## 2018-10-24 RX ADMIN — ACETAMINOPHEN 975 MG: 325 TABLET, FILM COATED ORAL at 08:34

## 2018-10-24 ASSESSMENT — ACTIVITIES OF DAILY LIVING (ADL)
ADLS_ACUITY_SCORE: 9

## 2018-10-24 NOTE — PLAN OF CARE
Problem: Patient Care Overview  Goal: Plan of Care/Patient Progress Review  Outcome: Improving  Pt is A&O x4. VSS on 1 L of oxygen at NOC. Up with SBA of 1. CMS+ and drsg C/D/I. Voiding adequately in the BR. Pain managed with scheduled tylenol. Progressing well per POC.

## 2018-10-24 NOTE — PROGRESS NOTES
"[facility]    Internal Medicine Hospitalist Progress Note  10/24/2018  I evaluated patient on the above date.    Dajuan Luevano Jr., MD  [phone] (p)  Text Page      Assessment & Plan   D/C home: see discharge summary for diagnoses.    # Pain Assessment:  Current Pain Score 10/24/2018   Patient currently in pain? -   Pain score (0-10) 1   Pain location -   Pain descriptors -   Izabella s pain level was assessed and she currently denies pain.        Interval History   Doing better. Dizziness resolved. Off O2.     -Data reviewed today: I reviewed all new labs and imaging over the last 24 hours. I personally reviewed no images or EKG's today.    Physical Exam   Heart Rate: 78, Blood pressure 115/65, pulse 73, temperature 97.8  F (36.6  C), temperature source Oral, resp. rate 16, height 1.6 m (5' 3\"), weight 70.3 kg (155 lb), SpO2 96 %, not currently breastfeeding.  Vitals:    10/21/18 0820   Weight: 70.3 kg (155 lb)     Vital Signs with Ranges  Temp:  [97.8  F (36.6  C)-99.1  F (37.3  C)] 97.8  F (36.6  C)  Pulse:  [73-87] 73  Heart Rate:  [78-91] 78  Resp:  [14-16] 16  BP: (106-115)/(60-68) 115/65  SpO2:  [88 %-96 %] 96 %  Patient Vitals for the past 24 hrs:   BP Temp Temp src Pulse Heart Rate Resp SpO2   10/24/18 0834 - - - - - - 96 %   10/24/18 0734 115/65 97.8  F (36.6  C) Oral 73 78 16 95 %   10/24/18 0030 110/68 98.9  F (37.2  C) Oral 87 81 14 95 %   10/23/18 2239 - - - - - - 92 %   10/23/18 2238 - - - - - - (!) 88 %   10/23/18 1947 106/61 98.8  F (37.1  C) Oral - 91 16 93 %   10/23/18 1531 106/60 99.1  F (37.3  C) Oral - 91 16 90 %     I/O's Last 24 hours  I/O last 3 completed shifts:  In: 270 [P.O.:270]  Out: 2350 [Urine:2350]    Constitutional: Alert, oriented, pleasant.  Respiratory: Diminished in bases. No crackles or wheezes.  Cardiovascular: RRR no m/r/g.  GI:   Skin/Integumen:   Other:        Data     Recent Labs  Lab 10/24/18  0652 10/23/18  1425 10/23/18  0724  10/21/18  0858   WBC 7.1  " --  8.0  --  8.1   HGB 8.1* 8.5* 8.2*  < > 12.7   MCV 88  --  89  --  87     --  175  --  241   INR  --   --   --   --  0.96     --  138  --  141   POTASSIUM 3.7  --  3.8  --  3.8   CHLORIDE 107  --  106  --  108   CO2 29  --  27  --  25   BUN 7  --  11  --  11   CR 0.58  --  0.56  --  0.58   ANIONGAP 5  --  5  --  8   BRITTANY 8.2*  --  7.8*  --  8.1*   GLC 84  --  88  --  117*   ALBUMIN  --   --   --   --  3.5   PROTTOTAL  --   --   --   --  6.6*   BILITOTAL  --   --   --   --  0.3   ALKPHOS  --   --   --   --  63   ALT  --   --   --   --  17   AST  --   --   --   --  21   < > = values in this interval not displayed.  Recent Labs   Lab Test  10/24/18   0652  10/23/18   0724  10/22/18   0603  10/21/18   0858  11/30/17   1105  06/27/17   1701   GLC  84  88   --   117*  92  88   BGM   --    --   106*   --    --    --          No results found for this or any previous visit (from the past 24 hour(s)).    Medications   All medications were reviewed.    lactated ringers 100 mL/hr at 10/22/18 1719       acetaminophen  975 mg Oral Q8H     enoxaparin  40 mg Subcutaneous Q24H     senna-docusate  1 tablet Oral BID    Or     senna-docusate  2 tablet Oral BID     sodium chloride (PF)  3 mL Intracatheter Q8H

## 2018-10-24 NOTE — PLAN OF CARE
Problem: Patient Care Overview  Goal: Plan of Care/Patient Progress Review  Discharge Planner PT   Patient plan for discharge: Home with assist from daughter and Home PT  Current status: Pt recalls 3/3 precautions, answered questions behind reasons for precautions. Pt able to perform supine<>sit mod-I with gait belt strap on RLE. Mod-I sit<>stand to FWW Pt amb 300' mod-I with FWW, step-through gait with B heel-strike. No dizziness reported this date. Pt ascends/descends 4 steps with unilateral rail x 2 SBA, performs well with rail on R ascending and L descending. Encouraging pt walks around the home at least every two hours at discharge. Developed HEP for discharge with notes in handout.  Barriers to return to prior living situation: None  Recommendations for discharge: Return home with assist from daughter and Home PT  Rationale for recommendations: Pt is below baseline in regards to mobility and will benefit from Home PT.       Entered by: Corey Tan 10/24/2018 1:31 PM      Physical Therapy Discharge Summary    Reason for therapy discharge:    All goals and outcomes met, no further needs identified.    Progress towards therapy goal(s). See goals on Care Plan in [facility] electronic health record for goal details.  Goals met    Therapy recommendation(s):    Continued therapy is recommended.  Rationale/Recommendations:   PT to progress strength and ween from walker.

## 2018-10-24 NOTE — PLAN OF CARE
Problem: Patient Care Overview  Goal: Plan of Care/Patient Progress Review  OT: pt eating breakfast when OT attempted for session

## 2018-10-24 NOTE — PLAN OF CARE
Problem: Patient Care Overview  Goal: Plan of Care/Patient Progress Review  Outcome: Adequate for Discharge Date Met: 10/24/18  Patient up with SBA and walker. Adequate I/O. Pain managed with tylenol this shift. Discharge AVS and medications reviewed with patient and daughter. No further questions stated at this time. Patient discharged home with all belongings.

## 2018-10-24 NOTE — PROGRESS NOTES
[facility]  Met with pt and family to discuss plans for HC.  Pt to be discharged home today and has agreed to have [facility] follow with PT and OT services.  Patient care support center processing referral.  Pt and family verbalized understanding that initial visit is scheduled for  10/25/18 or 10/26/18. Pt has 24 hour phone number for [facility] for any questions or concerns.

## 2018-10-24 NOTE — DISCHARGE SUMMARY
[facility]  Discharge Summary        Izabella Concepcion MRN# [id number]   YOB: 1955 Age: 62 year old     Date of Admission: 10/21/2018  Date of Discharge: 10/24/2018  Admitting Physician: Jase Lopez MD  Discharge Physician: Dajuan Luevano MD     Primary Provider: Blanco Wang  Primary Care Physician Phone Number: [phone]         Discharge Diagnoses:   1. Mechnical fall with transverse fracture of femoral neck - s/p right total hip arthroplasty 10/21/2018 .  2. Fever, suspect post-op related or atelectasis.  3. Hypoxia, suspect due to atelectasis.  4. Dizziness, suspect medication effect or hypovolemia/anemia.  5. Acute blood loss anemia.  6. Hypotension, suspect related to medications and hypovolemia.        Other Chronic Medical Problems:      1. Asthma, question allergy-induced.  2. Hyperlipidemia.       Allergies:         Allergies   Allergen Reactions     Blood Transfusion Related (Informational Only) Other (See Comments)     Patient has a history of a clinically significant antibody against RBC antigens.  A delay in compatible RBCs may occur.     Vigamox [Moxifloxacin] Swelling           Discharge Medications:        Current Discharge Medication List      START taking these medications    Details   acetaminophen (TYLENOL) 325 MG tablet Take 2 tablets (650 mg) by mouth every 4 hours as needed for mild pain  Qty: 40 tablet, Refills: 0    Associated Diagnoses: Closed fracture of right hip, initial encounter (H)      aspirin 325 MG tablet Take 1 tablet (325 mg) by mouth daily  Qty: 30 tablet, Refills: 0    Associated Diagnoses: Closed fracture of right hip, initial encounter (H)      HYDROmorphone (DILAUDID) 2 MG tablet Take 1 tablet (2 mg) by mouth every 4 hours as needed for moderate to severe pain  Qty: 20 tablet, Refills: 0    Associated Diagnoses: Closed fracture of right hip, initial encounter (H)      ondansetron (ZOFRAN-ODT) 4 MG ODT tab Take 1 tablet (4 mg)  by mouth every 6 hours as needed for nausea or vomiting  Qty: 10 tablet, Refills: 0    Associated Diagnoses: Closed fracture of right hip, initial encounter (H)      order for DME Equipment being ordered: Walker Wheels () and Walker ()  Treatment Diagnosis: Gait instability  Qty: 1 each, Refills: 0    Associated Diagnoses: Closed fracture of right hip, initial encounter (H)      senna-docusate (SENOKOT-S;PERICOLACE) 8.6-50 MG per tablet Take 2 tablets by mouth 2 times daily  Qty: 40 tablet, Refills: 0    Associated Diagnoses: Closed fracture of right hip, initial encounter (H)         CONTINUE these medications which have NOT CHANGED    Details   Multiple Vitamin (MULTI-VITAMINS) TABS Take 1 tablet by mouth daily                  Discharge Instructions and Follow-Up:      Follow-up Appointments     Follow-up and recommended labs and tests        Follow up with Dr. Padilla , at (location with clinic name or city) [facility], within 2 weeks to evaluate after surgery. No follow up   labs or test are needed.  Call Lesley for appointment [phone]                          Consultations This Hospital Stay:      Orthopedic Surgery.        Admission History:      Please see the H&P by Jase Lopez MD on 10/21/2018 for complete details. Briefly, Izabella Concepcion is a 62 year old female with history of questionable asthma who presented 10/21 with mechanical fall and found to have a R hip fracture.         Problem Oriented Hospital Course:      Mechnical fall with transverse fracture of femoral neck - s/p right total hip arthroplasty 10/21/2018 .  Presented after tripping over some pillows landing on the right side of her hip with subsequent pain. Denied any loss of consciousness or associated neurologic or cardiopulmonary symptoms with her fall.  Orthopedic surgery consulted and underwent above surgery.  - F/u with Ortho.  - Continue PT, OT (home).     Fever, suspect post-op related or  atelectasis.  Temp to 100.7 10/23. Had cough with spirometry but otherwise no productive cough. CXR 10/23 negative. UA 10/23 negative. Subsequently afebrile.    Hypoxia, possibly due to atelectasis.  Needing O2 10/23.  Has cough with spirometry but otherwise no productive cough. Off O2 10/24.     Dizziness, ?medication effect or hypovolemia/anemia.  Dizzy when standing 10/22 and 10/23, even with normal BP. Resolved 10/24.     Acute blood loss anemia.  Hgb 12.7 on admit 10/21.   Recent Labs  Lab 10/24/18  0652 10/23/18  1425 10/23/18  0724 10/22/18  0655 10/21/18  0858   HGB 8.1* 8.5* 8.2* 9.2* 12.7     Hypotension, suspect related to medications and hypovolemia (resolved).  SBP's in 80's 10/22 with some dizziness. Hgb 9.2 on 10/22. Improved with fluids/boluses. BP's stable 10/23.         Code Status:      Full Code        Pending Results:        Unresulted Labs Ordered in the Past 30 Days of this Admission     Date and Time Order Name Status Description    10/23/2018 1206 Urine Culture Aerobic Bacterial Preliminary                 Discharge Disposition:      Discharged to home.        Discharge Time:      Greater than 30 minutes.        Key Imaging Studies, Lab Findings and Procedures/Surgeries:        Results for orders placed or performed during the hospital encounter of 10/21/18   XR Pelvis 1/2 Views    Narrative    PELVIS ONE TO TWO VIEWS  10/21/2018 9:01 AM     HISTORY: Fall with right hip pain.    COMPARISON: None.      Impression    IMPRESSION: AP view of the pelvis is performed. Femoral heads are  located but there does appear to be a femoral neck fracture involving  the right hip. No obvious left hip fracture. Pelvic ring appears  intact. Bowel gas pattern is unremarkable.    JOHNNIE OBRIEN MD   XR Femur Right 2 Views    Narrative    RIGHT FEMUR TWO VIEWS   10/21/2018 9:08 AM     HISTORY: Fall with pain.     COMPARISON: None.      Impression    IMPRESSION: Transverse fracture of the femoral neck with impaction  and  external rotation of the distal fracture fragment.    IESHA LANIER MD   Foot  XR, G/E 3 views, right    Narrative    RIGHT FOOT THREE OR MORE VIEWS   10/21/2018 9:59 AM     HISTORY: Trauma to great toe.     COMPARISON: None.      Impression    IMPRESSION: Three views right foot. No fracture or dislocation. No  significant soft tissue swelling. No radiopaque foreign body is  appreciated. Mild degenerative changes are noted at the  metatarsophalangeal joint of the great toe.    JOHNNIE OBRIEN MD   XR Chest 1 View    Narrative    CHEST ONE VIEW   10/21/2018 9:58 AM     HISTORY: Preoperative.     COMPARISON: None.    FINDINGS: Single view of the chest. Lungs are clear. Heart is normal  in size. No pneumothorax.    JOHNNIE OBRIEN MD   Surgery     10/21/2018:  Right total hip arthroplasty.    XR Pelvis w Hip Port Right 1 View    Narrative    PELVIS AND HIP PORTABLE RIGHT ONE VIEW   10/21/2018 3:03 PM     HISTORY: Postop hip arthroplasty.     COMPARISON: 10/21/2018      Impression    IMPRESSION: Right total hip arthroplasty with components in good  position.    IESHA LANIER MD   XR Chest Port 1 View    Narrative    XR CHEST PORT 1 VW  10/23/2018 12:52 PM     HISTORY:  fever, hypoxia, evaluate for focal infiltrates;     COMPARISON: Film dated 10/21/2018    FINDINGS:  Heart and pulmonary vasculature are normal. The lungs are  clear.      Impression    IMPRESSION: No active areas of infiltrate.    PAUL GRIGGS MD

## 2018-10-24 NOTE — PLAN OF CARE
Problem: Patient Care Overview  Goal: Plan of Care/Patient Progress Review  Discharge Planner OT   Patient plan for discharge: home with daughter to assist  Current status: Reviewed hip precautions with pt, pt completed supine to/from sit EOB with leg  SBA, SBA sit to stand, amb with FWW to/from bathroom SBA, toilet transfer with use of EOS for support SBA, LB dressing with AE mod I. Educated on community/online resources for AE needs. Pt stated might be able to borrow some AE otherwise will acquire AE online. Pt states she will use her daughters walk in shower as not able to access her tub which has doors that can not be removed.  Barriers to return to prior living situation: pain, hip precautions, dizziness, lives alone, poor activity tolerance  Recommendations for discharge: home with daughter to assist with ADLS/IADLS per plan established by the Occupational Therapist  Rationale for recommendations: Pt daughter to assist with ADLS/IADLS as needed       Entered by: Kyra Espinoza 10/24/2018 11:18 AM      Pt to discharge home today with daughters assist as needed, GOALS PARTIALLY MET, see discharge summary    Occupational Therapy Discharge Summary    Reason for therapy discharge: Discharge from facility to home.       Progress towards therapy goal(s). See goals on Care Plan in [facility] electronic health record for goal details.  Goals partially met.  Barriers to achieving goals:   discharge from facility.    Therapy recommendation(s):    No further therapy is recommended.  Family plans to assist as needed.

## 2018-10-25 ENCOUNTER — TELEPHONE (OUTPATIENT)
Dept: FAMILY MEDICINE | Facility: CLINIC | Age: 63
End: 2018-10-25

## 2018-10-25 LAB
BLD PROD TYP BPU: NORMAL
BLD PROD TYP BPU: NORMAL
BLD UNIT ID BPU: 0
BLD UNIT ID BPU: 0
BLOOD PRODUCT CODE: NORMAL
BLOOD PRODUCT CODE: NORMAL
BPU ID: NORMAL
BPU ID: NORMAL
TRANSFUSION STATUS PATIENT QL: NORMAL

## 2018-10-25 NOTE — TELEPHONE ENCOUNTER
"Hospital/TCU/ED for chronic condition Discharge Protocol    \"Hi, my name is Maribeth Cobian, a registered nurse, and I am calling from [facility].  I am calling to follow up and see how things are going for you after your recent emergency visit/hospital/TCU stay.\"    Tell me how you are doing now that you are home?\" Patient is doing well and just finished having home PT      Discharge Instructions    \"Let's review your discharge instructions.  What is/are the follow-up recommendations?  Pt. Response: patient to f/u with ortho and continue with PT/OT while at home.    \"Has an appointment with your primary care provider been scheduled?\"   no, patient was not advised to see PCP    \"When you see the provider, I would recommend that you bring your medications with you.\"    Medications    \"Tell me what changed about your medicines when you discharged?\"    Changes to chronic meds?    0-1    \"What questions do you have about your medications?\"    None     New diagnoses of heart failure, COPD, diabetes, or MI?    No              Medication reconciliation completed? Yes  Was MTM referral placed (*Make sure to put transitions as reason for referral)?   No    Call Summary    \"What questions or concerns do you have about your recent visit and your follow-up care?\"     none    \"If you have questions or things don't continue to improve, we encourage you contact us through the main clinic number (give number).  Even if the clinic is not open, triage nurses are available 24/7 to help you.     We would like you to know that our clinic has extended hours (provide information).  We also have urgent care (provide details on closest location and hours/contact info)\"      \"Thank you for your time and take care!\"    ENEDINA Thomson, RN  [employer] [facility]             "

## 2018-10-25 NOTE — TELEPHONE ENCOUNTER
Closed Fracture Of Right Hip, Initial Encounter (H) 10/24/2018 6 mo ED/IP 0/1  [phone] (home)

## 2018-10-29 ENCOUNTER — TELEPHONE (OUTPATIENT)
Dept: FAMILY MEDICINE | Facility: CLINIC | Age: 63
End: 2018-10-29

## 2018-10-29 NOTE — TELEPHONE ENCOUNTER
Pt's PT advise her to schedule a f/u appt with .  She had hip replacement sgy 10/21/2018 and is attending PT.  She would like an appt next Monday 11/05/2018.  Izabella may be reached at [phone]. Ok to leave a detailed message.

## 2018-11-06 NOTE — PROGRESS NOTES
"SUBJECTIVE:                                                      Patient presents for Hospital Followup Visit:    Hospital:  [facility]      Date of Admission: 10/21/2018  Date of Discharge: 10/24/2018  Reason(s) for Admission: hip fracture             Problems taking medications regularly:  None       Medication changes since discharge: None       Problems adhering to non-medication therapy:  None    Summary of hospitalization:  [facility] discharge summary reviewed  Diagnostic Tests/Treatments reviewed.  Follow up needed: orthopedic surgery as directed   Other Healthcare Providers Involved in Patient s Care:         None  Update since discharge: improved.       Tripped on pillow on wooden floor and fell on right hip  Sustained fracture and had right XAVIER  Doing well since hospital discharge  Several falls over the last several years with wrist fracture in 2016  Has had brain MRI scans to evaluate  Normal B12 level in 11/2017  Wonders if she should have DEXA scan and what she can do to reduce fall risk     OBJECTIVE:                                                      /71  Pulse 63  Temp 97.2  F (36.2  C) (Tympanic)  Ht 5' 3\" (1.6 m)  Wt 158 lb (71.7 kg)  SpO2 96%  BMI 27.99 kg/m2      GENERAL APPEARANCE: healthy, no distress and cooperative  HENT: The mucous membranes are moist.   RESP: lungs clear to auscultation - no rales, rhonchi or wheezes  CV: Heart with regular rate and rhythm.   NEURO: Alert and oriented to person, place and time. Cranial nerves 2-12 appear grossly intact.   No pronator drift.  Normal cerebellar testing.  Walking with cane due to hip fracture.  Negative Romberg test.  Symmetric strength.          ASSESSMENT/PLAN:                                                      1. Closed fracture of shaft of right femur with routine healing, unspecified fracture morphology, subsequent encounter  Continue post operative care and therapy per orthopedics    2. Falls " frequently  Referral for vestibular and balance PT  Discussed repeat MRI, patient declined  - PHYSICAL THERAPY REFERRAL    3. Screening for malignant neoplasm of cervix  Continue follow up with Dr. Ramirez; normal Pap in 2017    4. Need for prophylactic vaccination and inoculation against influenza  She declined flu shot     5. Need for prophylactic vaccination with tetanus-diphtheria (Td)    - TDAP, IM (10 - 64 YRS) - Adacel    6. Asymptomatic postmenopausal status  Given several fractures check BMD  - DX Hip/Pelvis/Spine; Future       Post Discharge Medication Reconciliation: discharge medications reconciled and changed, per note/orders (see AVS).  Issues to address: No issues identified.  Plan of care communicated with patient      Type of Medical Decision Making Face-to-Face Visit within 7 Days Face-to-Face Visit within 14 days   Moderate Complexity [id number]   High Complexity [id number]

## 2018-11-06 NOTE — TELEPHONE ENCOUNTER
Next 5 appointments (look out 90 days)     Nov 07, 2018  9:00 AM CST   [facility]   [facility]    [address] [facility] [address]   [phone]                Patient has already scheduled hospital follow up (11/17/18)   Closing this encounter    Ximena BOSCH RN

## 2018-11-07 ENCOUNTER — OFFICE VISIT (OUTPATIENT)
Dept: FAMILY MEDICINE | Facility: CLINIC | Age: 63
End: 2018-11-07
Payer: MEDICARE

## 2018-11-07 VITALS
BODY MASS INDEX: 28 KG/M2 | WEIGHT: 158 LBS | OXYGEN SATURATION: 96 % | HEIGHT: 63 IN | TEMPERATURE: 97.2 F | HEART RATE: 63 BPM | DIASTOLIC BLOOD PRESSURE: 71 MMHG | SYSTOLIC BLOOD PRESSURE: 121 MMHG

## 2018-11-07 DIAGNOSIS — Z12.4 SCREENING FOR MALIGNANT NEOPLASM OF CERVIX: ICD-10-CM

## 2018-11-07 DIAGNOSIS — S72.301D CLOSED FRACTURE OF SHAFT OF RIGHT FEMUR WITH ROUTINE HEALING, UNSPECIFIED FRACTURE MORPHOLOGY, SUBSEQUENT ENCOUNTER: Primary | ICD-10-CM

## 2018-11-07 DIAGNOSIS — Z78.0 ASYMPTOMATIC POSTMENOPAUSAL STATUS: ICD-10-CM

## 2018-11-07 DIAGNOSIS — Z23 NEED FOR PROPHYLACTIC VACCINATION WITH TETANUS-DIPHTHERIA (TD): ICD-10-CM

## 2018-11-07 DIAGNOSIS — R29.6 FALLS FREQUENTLY: ICD-10-CM

## 2018-11-07 DIAGNOSIS — Z23 NEED FOR PROPHYLACTIC VACCINATION AND INOCULATION AGAINST INFLUENZA: ICD-10-CM

## 2018-11-07 PROCEDURE — 99495 TRANSJ CARE MGMT MOD F2F 14D: CPT | Mod: 25 | Performed by: INTERNAL MEDICINE

## 2018-11-07 PROCEDURE — 90471 IMMUNIZATION ADMIN: CPT | Performed by: INTERNAL MEDICINE

## 2018-11-07 PROCEDURE — 90715 TDAP VACCINE 7 YRS/> IM: CPT | Performed by: INTERNAL MEDICINE

## 2018-11-07 NOTE — NURSING NOTE
Prior to injection verified patient identity using patient's name and date of birth.  Due to injection administration, patient instructed to remain in clinic for 15 minutes  afterwards, and to report any adverse reaction to me immediately.    Screening Questionnaire for Adult Immunization    Are you sick today?   No   Do you have allergies to medications, food, a vaccine component or latex?   No   Have you ever had a serious reaction after receiving a vaccination?   No   Do you have a long-term health problem with heart disease, lung disease, asthma, kidney disease, metabolic disease (e.g. diabetes), anemia, or other blood disorder?   No   Do you have cancer, leukemia, HIV/AIDS, or any other immune system problem?   No   In the past 3 months, have you taken medications that affect  your immune system, such as prednisone, other steroids, or anticancer drugs; drugs for the treatment of rheumatoid arthritis, Crohn s disease, or psoriasis; or have you had radiation treatments?   No   Have you had a seizure, or a brain or other nervous system problem?   No   During the past year, have you received a transfusion of blood or blood     products, or been given immune (gamma) globulin or antiviral drug?   No   For women: Are you pregnant or is there a chance you could become        pregnant during the next month?   No   Have you received any vaccinations in the past 4 weeks?   No     Immunization questionnaire answers were all negative.        Per orders of Dr. Wang, injection of Tdap given by Cuca Hathaway. Patient instructed to remain in clinic for 15 minutes afterwards, and to report any adverse reaction to me immediately.       Screening performed by Cuca Hathaway on 11/7/2018 at 10:02 AM.

## 2018-11-27 ENCOUNTER — HOSPITAL ENCOUNTER (OUTPATIENT)
Dept: PHYSICAL THERAPY | Facility: CLINIC | Age: 63
Setting detail: THERAPIES SERIES
End: 2018-11-27
Attending: INTERNAL MEDICINE
Payer: MEDICARE

## 2018-11-27 PROCEDURE — G8978 MOBILITY CURRENT STATUS: HCPCS | Mod: GP,CK | Performed by: PHYSICAL THERAPIST

## 2018-11-27 PROCEDURE — 40000840 ZZHC STATISTIC PT VESTIBULAR VISIT: Performed by: PHYSICAL THERAPIST

## 2018-11-27 PROCEDURE — G8979 MOBILITY GOAL STATUS: HCPCS | Mod: GP,CI | Performed by: PHYSICAL THERAPIST

## 2018-11-27 PROCEDURE — 97162 PT EVAL MOD COMPLEX 30 MIN: CPT | Mod: GP | Performed by: PHYSICAL THERAPIST

## 2018-11-27 ASSESSMENT — 6 MINUTE WALK TEST (6MWT): TOTAL DISTANCE WALKED (FT): 965

## 2018-11-28 NOTE — PROGRESS NOTES
11/27/18 1300   Quick Adds   Type of Visit Initial OP PT Evaluation   General Information   Start of Care Date 11/27/18   Referring Physician Dr. Blanco Wang   Orders Evaluate and Treat as Indicated   Order Date 11/07/18   Medical Diagnosis Falls frequently   Onset of illness/injury or Date of Surgery 11/07/18   Precautions/Limitations right hip precautions   Surgical/Medical history reviewed Yes   Pertinent history of current problem (include personal factors and/or comorbidities that impact the POC) Pt presents to physical therapy with recurring falls that started in 2011. Had two falls in 2011/2012 in which she fell on her head and ever since then has felt like she has been less stable. Reports having lost her visual memory after the fall in 2011. Reports having multiple episodes of waking up and not being able to walk steadily recently. Has had several falls in the last several years since 2011. Most recently fell 10/21/18 and had transverse fracture of femoral neck which required right XAVIER (posterior approach). Was hospitalized 10/21-10/24/18. Did home care and now following up with OP ortho PT 2-3x/week   Pertinent Visual History  Reports some decreased vision over the years.    Prior level of function comment Independent with all functional mobility. Normally very active walking 15-20 miles per week ( 4 miles 4 days per week), some kayaking,    Current Community Support Family/friend caregiver   Patient role/Employment history Employed   Living environment (Duplex, children in other unit)   Current Assistive Devices Standard Cane   Patient/Family Goals Statement Return to steady walking, biking, and prior activity   Fall Risk Screen   Fall screen completed by PT   Have you fallen 2 or more times in the past year? No   Have you fallen and had an injury in the past year? Yes   Timed Up and Go score (seconds) 11.7  ( completed with a cane)   Is patient a fall risk? Yes   Fall screen comments According to DGI  is a fall risk.  Without cane took her 27 sec.    Pain   Pain comments Having some intermittant R side sciatica. Rates R hip pain 0-1/10   Cognitive Status Examination   Orientation orientation to person, place and time   Level of Consciousness alert   Follows Commands and Answers Questions 100% of the time   Observation   Observation Cautious   Range of Motion (ROM)   ROM Comment Neck WNL   Gait   Gait Comments Arrived ambulating with cane. Appears tense and cautious. WB through a cane. Decreased step  length and speed.  Completed TUG without an AD and was antalgic with decresed WB on R LE.    Gait Special Tests   Gait Special Tests DYNAMIC GAIT INDEX;SIX MINUTE WALK TEST   Gait Special Tests Dynamic Gait Index   Score out of 24 10   Comments Used a cane. Indicates high risk of falling.    Gait Special Tests Six Minute Walk Test   Feet 965 Feet   Comments used a cane   Balance Special Tests   Balance Special Tests Modified CTSIB Conditions   Balance Special Tests Modified CTSIB Conditions   Condition 1, seconds 30 Seconds   Condition 2, seconds 30 Seconds   Condition 4, seconds 18 Seconds   Condition 5, seconds 12 Seconds   Sensory Examination   Sensory Perception Comments Denies tingling or numbness   Oculomotor Exam   Smooth Pursuit Normal   Saccades Normal   VOR Normal   Rapid Head Thrust Other   Rapid Head Thrust Comments corrective saccade in both directions noted.    Infrared Goggle Exam or Frenzel Lense Exam   Vestibular Suppressant in Last 24 Hours? No   Exam completed with Infrared Goggles   Spontaneous Nystagmus Other   Spontaneous Nystagmus comments slight very mild L beating.    Gaze Evoked Nystagmus Other   Gaze Evoked Nystagmus comments With gaze L increased L beating. R gaze negative. upward gaze negative   Head Shake Horizontal Nystagmus Horizontal L   [place]-Hallpike (right) Negative   [place]-Hallpike (Left) Negative   [facility] Supine Roll Test (Right) Other   [facility] Supine Roll Test (Right) Comments slight L  beating   [facility] Supine Roll Test (Left) Other   [facility] Supine Roll Test (Left) Comments  mild L beating   Dynamic Visual Acuity (DVA)   Static Acuity (LogMar) 0.1   Horizontal Head Movement at 1 Hz (LogMar) 0.2   Horizontal Head Movement at 2 Hz (LogMar) 0.7   DVA Comments GAve her a headache at 2 Hz speed and had to stop.    Planned Therapy Interventions   Planned Therapy Interventions balance training;neuromuscular re-education   Clinical Impression   Criteria for Skilled Therapeutic Interventions Met yes, treatment indicated   PT Diagnosis R unilateral hypofunction, impaired balance   Influenced by the following impairments Impaired gaze stabilization, impaired balance, recovery from R hip fracture   Functional limitations due to impairments Walking, biking, kayaking,    Clinical Presentation Evolving/Changing   Clinical Presentation Rationale DVA, mCTSIB, 6MWT, DGI, dizziness   Clinical Decision Making (Complexity) Moderate complexity   Predicted Duration of Therapy Intervention (days/wks) 1 time next week then once every 2 weeks 10 visits over 90 days   Risk & Benefits of therapy have been explained Yes   Patient, Family & other staff in agreement with plan of care Yes   Education Assessment   Preferred Learning Style Listening;Demonstration   Barriers to Learning No barriers   GOALS   PT Eval Goals 2;1;3   Goal 1   Goal Identifier DVA   Goal Description Pt will achive logmar of 0.4 at 2Hz speed during DVA testing to show improved gaze stabilization to help with stable walking and bike riding.    Target Date 02/25/19   Goal 2   Goal Identifier DGI   Goal Description Pt will score greater than 22/24 on the DGI to show improvement in functional balance and strength for safer ambulation at home and in community.    Target Date 02/25/19   Goal 3   Goal Identifier 6MWT   Goal Description Pt will ambulate more than 1250 feet without AD on 6MWT to show improvement in strength and endurance while recovering from hip as  well as return to prior level of activity tolerance by reaching minimum distance for age/gender.   Target Date 02/25/19   Total Evaluation Time   Total Evaluation Time (Minutes) 65

## 2018-11-28 NOTE — PROGRESS NOTES
[facility]        OUTPATIENT PHYSICAL THERAPY FUNCTIONAL EVALUATION  PLAN OF TREATMENT FOR OUTPATIENT REHABILITATION  (COMPLETE FOR INITIAL CLAIMS ONLY)  Patient's Last Name, First Name, M.I.  YOB: 1955  Izabella Concepcion     Provider's Name   [facility]   Medical Record No.  [id number]     Start of Care Date:  11/27/18   Onset Date:  11/07/18   Type:     _X__PT   ____OT  ____SLP Medical Diagnosis:   Falls frequently     PT Diagnosis:  R unilateral hypofunction, impaired balance Visits from SOC:  1                              __________________________________________________________________________________  Plan of Treatment/Functional Goals:  balance training, neuromuscular re-education           GOALS  DVA  Pt will achive logmar of 0.4 at 2Hz speed during DVA testing to show improved gaze stabilization to help with stable walking and bike riding.   02/25/19    DGI  Pt will score greater than 22/24 on the DGI to show improvement in functional balance and strength for safer ambulation at home and in community.   02/25/19    6MWT  Pt will ambulate more than 1250 feet without AD on 6MWT to show improvement in strength and endurance while recovering from hip as well as return to prior level of activity tolerance by reaching minimum distance for age/gender.  02/25/19                          Therapy Frequency:      Predicted Duration of Therapy Intervention:  1 time next week then once every 2 weeks 10 visits over 90 days    Elba Oliver, PT                                    I CERTIFY THE NEED FOR THESE SERVICES FURNISHED UNDER        THIS PLAN OF TREATMENT AND WHILE UNDER MY CARE     (Physician co-signature of this document indicates review and certification of the therapy plan).                Certification Date From:    11/27/18  Certification Date To:    2/25/19  Referring Provider:  Dr. Blanco Wang    Initial Assessment  See Epic Evaluation- Start of Care Date: 11/27/18

## 2018-11-28 NOTE — PROGRESS NOTES
11/27/18 1300   Quick Adds   Type of Visit Initial OP PT Evaluation   General Information   Start of Care Date 11/27/18   Referring Physician Dr. Blanco Wang   Orders Evaluate and Treat as Indicated   Order Date 11/07/18   Medical Diagnosis Falls frequently   Onset of illness/injury or Date of Surgery 11/07/18   Precautions/Limitations right hip precautions   Surgical/Medical history reviewed Yes   Pertinent history of current problem (include personal factors and/or comorbidities that impact the POC) Pt presents to physical therapy with recurring falls that started in 2011. Had two falls in 2011/2012 in which she fell on her head and ever since then has felt like she has been less stable. Reports having lost her visual memory after the fall in 2011. Reports having multiple episodes of waking up and not being able to walk steadily recently. Has had several falls in the last several years since 2011. Most recently fell 10/21/18 and had transverse fracture of femoral neck which required right XAVIER (posterior approach). Was hospitalized 10/21-10/24/18. Did home care and now following up with OP ortho PT 2-3x/week   Pertinent Visual History  Reports some decreased vision over the years.    Prior level of function comment Independent with all functional mobility. Normally very active walking 15-20 miles per week ( 4 miles 4 days per week), some kayaking,    Current Community Support Family/friend caregiver   Patient role/Employment history Employed   Living environment (Duplex, children in other unit)   Current Assistive Devices Standard Cane   Patient/Family Goals Statement Return to steady walking, biking, and prior activity   Fall Risk Screen   Fall screen completed by PT   Have you fallen 2 or more times in the past year? No   Have you fallen and had an injury in the past year? Yes   Timed Up and Go score (seconds) 11.7  ( completed with a cane)   Is patient a fall risk? Yes   Fall screen comments According to DGI  is a fall risk.  Without cane took her 27 sec.    Pain   Pain comments Having some intermittant R side sciatica. Rates R hip pain 0-1/10   Cognitive Status Examination   Orientation orientation to person, place and time   Level of Consciousness alert   Follows Commands and Answers Questions 100% of the time   Observation   Observation Cautious   Range of Motion (ROM)   ROM Comment Neck WNL   Gait   Gait Comments Arrived ambulating with cane. Appears tense and cautious. WB through a cane. Decreased step  length and speed.  Completed TUG without an AD and was antalgic with decresed WB on R LE.    Gait Special Tests   Gait Special Tests DYNAMIC GAIT INDEX;SIX MINUTE WALK TEST   Gait Special Tests Dynamic Gait Index   Score out of 24 10   Comments Used a cane. Indicates high risk of falling.    Gait Special Tests Six Minute Walk Test   Feet 965 Feet   Comments used a cane   Balance Special Tests   Balance Special Tests Modified CTSIB Conditions   Balance Special Tests Modified CTSIB Conditions   Condition 1, seconds 30 Seconds   Condition 2, seconds 30 Seconds   Condition 4, seconds 18 Seconds   Condition 5, seconds 12 Seconds   Sensory Examination   Sensory Perception Comments Denies tingling or numbness   Oculomotor Exam   Smooth Pursuit Normal   Saccades Normal   VOR Normal   Rapid Head Thrust Other   Rapid Head Thrust Comments corrective saccade in both directions noted.    Infrared Goggle Exam or Frenzel Lense Exam   Vestibular Suppressant in Last 24 Hours? No   Exam completed with Infrared Goggles   Spontaneous Nystagmus Other   Spontaneous Nystagmus comments slight very mild L beating.    Gaze Evoked Nystagmus Other   Gaze Evoked Nystagmus comments With gaze L increased L beating. R gaze negative. upward gaze negative   Head Shake Horizontal Nystagmus Horizontal L   [place]-Hallpike (right) Negative   [place]-Hallpike (Left) Negative   [facility] Supine Roll Test (Right) Other   [facility] Supine Roll Test (Right) Comments slight L  beating   [facility] Supine Roll Test (Left) Other   [facility] Supine Roll Test (Left) Comments  mild L beating   Dynamic Visual Acuity (DVA)   Static Acuity (LogMar) 0.1   Horizontal Head Movement at 1 Hz (LogMar) 0.2   Horizontal Head Movement at 2 Hz (LogMar) 0.7   DVA Comments GAve her a headache at 2 Hz speed and had to stop.    Planned Therapy Interventions   Planned Therapy Interventions balance training;neuromuscular re-education   Clinical Impression   Criteria for Skilled Therapeutic Interventions Met yes, treatment indicated   PT Diagnosis L unilateral hypofunction, impaired balance   Influenced by the following impairments Impaired gaze stabilization, impaired balance, recovery from R hip fracture   Functional limitations due to impairments Walking, biking, kayaking,    Clinical Presentation Evolving/Changing   Clinical Presentation Rationale DVA, mCTSIB, 6MWT, DGI, dizziness   Clinical Decision Making (Complexity) Moderate complexity   Predicted Duration of Therapy Intervention (days/wks) 1 time next week then once every 2 weeks 10 visits over 90 days   Risk & Benefits of therapy have been explained Yes   Patient, Family & other staff in agreement with plan of care Yes   Education Assessment   Preferred Learning Style Listening;Demonstration   Barriers to Learning No barriers   GOALS   PT Eval Goals 2;1;3   Goal 1   Goal Identifier DVA   Goal Description Pt will achive logmar of 0.4 at 2Hz speed during DVA testing to show improved gaze stabilization to help with stable walking and bike riding.    Target Date 02/25/19   Goal 2   Goal Identifier DGI   Goal Description Pt will score greater than 22/24 on the DGI to show improvement in functional balance and strength for safer ambulation at home and in community.    Target Date 02/25/19   Goal 3   Goal Identifier 6MWT   Goal Description Pt will ambulate more than 1300 feet without AD on 6MWT to show improvement in strength and endurance while recovering from hip as  well as return to prior level of activity tolerance by reaching minimum distance for age/gender.   Total Evaluation Time   Total Evaluation Time (Minutes) 65

## 2018-11-28 NOTE — PROGRESS NOTES
11/27/18 1300   Quick Adds   Type of Visit Initial OP PT Evaluation   General Information   Start of Care Date 11/27/18   Referring Physician Dr. Blanco Wang   Orders Evaluate and Treat as Indicated   Order Date 11/07/18   Medical Diagnosis Falls frequently   Onset of illness/injury or Date of Surgery 11/07/18   Precautions/Limitations right hip precautions   Surgical/Medical history reviewed Yes   Pertinent history of current problem (include personal factors and/or comorbidities that impact the POC) Pt presents to physical therapy with recurring falls that started in 2011. Had two falls in 2011/2012 in which she fell on her head and ever since then has felt like she has been less stable. Reports having lost her visual memory after the fall in 2011. Reports having multiple episodes of waking up and not being able to walk steadily recently. Has had several falls in the last several years since 2011. Most recently fell 10/21/18 and had transverse fracture of femoral neck which required right XAVIER (posterior approach). Was hospitalized 10/21-10/24/18. Did home care and now following up with OP ortho PT 2-3x/week   Pertinent Visual History  Reports some decreased vision over the years.    Prior level of function comment Independent with all functional mobility. Normally very active walking 15-20 miles per week ( 4 miles 4 days per week), some kayaking,    Current Community Support Family/friend caregiver   Patient role/Employment history Employed   Living environment (Duplex, children in other unit)   Current Assistive Devices Standard Cane   Patient/Family Goals Statement Return to steady walking, biking, and prior activity   Fall Risk Screen   Fall screen completed by PT   Have you fallen 2 or more times in the past year? No   Have you fallen and had an injury in the past year? Yes   Timed Up and Go score (seconds) 11.7  ( completed with a cane)   Is patient a fall risk? Yes   Fall screen comments According to DGI  is a fall risk.  Without cane took her 27 sec.    Pain   Pain comments Having some intermittant R side sciatica. Rates R hip pain 0-1/10   Cognitive Status Examination   Orientation orientation to person, place and time   Level of Consciousness alert   Follows Commands and Answers Questions 100% of the time   Observation   Observation Cautious   Range of Motion (ROM)   ROM Comment Neck WNL   Gait   Gait Comments Arrived ambulating with cane. Appears tense and cautious. WB through a cane. Decreased step  length and speed.  Completed TUG without an AD and was antalgic with decresed WB on R LE.    Gait Special Tests   Gait Special Tests DYNAMIC GAIT INDEX;SIX MINUTE WALK TEST   Gait Special Tests Dynamic Gait Index   Score out of 24 10   Comments Used a cane. Indicates high risk of falling.    Gait Special Tests Six Minute Walk Test   Feet 965 Feet   Comments used a cane   Balance Special Tests   Balance Special Tests Modified CTSIB Conditions   Balance Special Tests Modified CTSIB Conditions   Condition 1, seconds 30 Seconds   Condition 2, seconds 30 Seconds   Condition 4, seconds 18 Seconds   Condition 5, seconds 12 Seconds   Sensory Examination   Sensory Perception Comments Denies tingling or numbness   Oculomotor Exam   Smooth Pursuit Normal   Saccades Normal   VOR Normal   Rapid Head Thrust Other   Rapid Head Thrust Comments corrective saccade in both directions noted.    Infrared Goggle Exam or Frenzel Lense Exam   Vestibular Suppressant in Last 24 Hours? No   Exam completed with Infrared Goggles   Spontaneous Nystagmus Other   Spontaneous Nystagmus comments slight very mild L beating.    Gaze Evoked Nystagmus Other   Gaze Evoked Nystagmus comments With gaze L increased L beating. R gaze negative. upward gaze negative   Head Shake Horizontal Nystagmus Horizontal L   [place]-Hallpike (right) Negative   [place]-Hallpike (Left) Negative   [facility] Supine Roll Test (Right) Other   [facility] Supine Roll Test (Right) Comments slight L  beating   [facility] Supine Roll Test (Left) Other   [facility] Supine Roll Test (Left) Comments  mild L beating   Dynamic Visual Acuity (DVA)   Static Acuity (LogMar) 0.1   Horizontal Head Movement at 1 Hz (LogMar) 0.2   Horizontal Head Movement at 2 Hz (LogMar) 0.7   DVA Comments GAve her a headache at 2 Hz speed and had to stop.    Planned Therapy Interventions   Planned Therapy Interventions balance training;neuromuscular re-education   Clinical Impression   Criteria for Skilled Therapeutic Interventions Met yes, treatment indicated   PT Diagnosis L unilateral hypofunction, impaired balance   Influenced by the following impairments Impaired gaze stabilization, impaired balance, recovery from R hip fracture   Functional limitations due to impairments Walking, biking, kayaking,    Clinical Presentation Evolving/Changing   Clinical Presentation Rationale DVA, mCTSIB, 6MWT, DGI, dizziness   Clinical Decision Making (Complexity) Moderate complexity   Predicted Duration of Therapy Intervention (days/wks) 1 time next week then once every 2 weeks 10 visits over 90 days   Risk & Benefits of therapy have been explained Yes   Patient, Family & other staff in agreement with plan of care Yes   Education Assessment   Preferred Learning Style Listening;Demonstration   Barriers to Learning No barriers   GOALS   PT Eval Goals 2;1;3   Goal 1   Goal Identifier DVA   Goal Description Pt will achive logmar of 0.4 at 2Hz speed during DVA testing to show improved gaze stabilization to help with stable walking and bike riding.    Target Date 02/25/19   Goal 2   Goal Identifier DGI   Goal Description Pt will score greater than 22/24 on the DGI to show improvement in functional balance and strength for safer ambulation at home and in community.    Target Date 02/25/19   Goal 3   Goal Identifier 6MWT   Goal Description Pt will ambulate more than 1250 feet without AD on 6MWT to show improvement in strength and endurance while recovering from hip as  well as return to prior level of activity tolerance by reaching minimum distance for age/gender.   Target Date 02/25/19   Total Evaluation Time   Total Evaluation Time (Minutes) 65

## 2018-12-06 ENCOUNTER — HOSPITAL ENCOUNTER (OUTPATIENT)
Dept: PHYSICAL THERAPY | Facility: CLINIC | Age: 63
Setting detail: THERAPIES SERIES
End: 2018-12-06
Attending: INTERNAL MEDICINE
Payer: MEDICARE

## 2018-12-06 PROCEDURE — 40000840 ZZHC STATISTIC PT VESTIBULAR VISIT: Performed by: PHYSICAL THERAPIST

## 2018-12-06 PROCEDURE — 97112 NEUROMUSCULAR REEDUCATION: CPT | Mod: GP | Performed by: PHYSICAL THERAPIST

## 2018-12-17 ENCOUNTER — TRANSFERRED RECORDS (OUTPATIENT)
Dept: HEALTH INFORMATION MANAGEMENT | Facility: CLINIC | Age: 63
End: 2018-12-17

## 2018-12-18 ENCOUNTER — HOSPITAL ENCOUNTER (OUTPATIENT)
Dept: BONE DENSITY | Facility: CLINIC | Age: 63
Discharge: HOME | End: 2018-12-18
Attending: INTERNAL MEDICINE | Admitting: INTERNAL MEDICINE
Payer: MEDICARE

## 2018-12-18 DIAGNOSIS — Z78.0 ASYMPTOMATIC POSTMENOPAUSAL STATUS: ICD-10-CM

## 2018-12-18 PROCEDURE — 77080 DXA BONE DENSITY AXIAL: CPT

## 2018-12-18 NOTE — RESULT ENCOUNTER NOTE
The following letter pertains to your most recent diagnostic tests:    Your bone mineral density test shows elevated risk for subsequent fractures.  I recommend that you schedule an office visit appointment with me to discuss medication options to reduce your risk for future fractures.        Sincerely,    Dr. Wang

## 2018-12-18 NOTE — LETTER
[facility]  [address] [facility]  [address]  [place]  [address]  Tel: [phone]    December 18, 2018    Izabella Concepcion  [address] [facility] [id number]        Dear Ms. Concepcion,    The following letter pertains to your most recent diagnostic tests:    Your bone mineral density test shows elevated risk for subsequent fractures.  I recommend that you schedule an office visit appointment with me to discuss medication options to reduce your risk for future fractures.      Sincerely,    Blanco Wang MD/KARIE

## 2019-01-23 ENCOUNTER — OFFICE VISIT (OUTPATIENT)
Dept: FAMILY MEDICINE | Facility: CLINIC | Age: 64
End: 2019-01-23
Payer: MEDICARE

## 2019-01-23 VITALS
DIASTOLIC BLOOD PRESSURE: 75 MMHG | TEMPERATURE: 97.8 F | SYSTOLIC BLOOD PRESSURE: 121 MMHG | OXYGEN SATURATION: 98 % | HEIGHT: 63 IN | BODY MASS INDEX: 28.51 KG/M2 | HEART RATE: 68 BPM | WEIGHT: 160.9 LBS

## 2019-01-23 DIAGNOSIS — M85.80 OSTEOPENIA, UNSPECIFIED LOCATION: Primary | ICD-10-CM

## 2019-01-23 PROCEDURE — 99213 OFFICE O/P EST LOW 20 MIN: CPT | Performed by: INTERNAL MEDICINE

## 2019-01-23 ASSESSMENT — MIFFLIN-ST. JEOR: SCORE: 1243.97

## 2019-01-23 NOTE — NURSING NOTE
"Chief Complaint   Patient presents with     Results     patient here to discuss DEXA scan results that was done on 12/18     /75   Pulse 68   Temp 97.8  F (36.6  C) (Tympanic)   Ht 1.6 m (5' 3\")   Wt 73 kg (160 lb 14.4 oz)   SpO2 98%   BMI 28.50 kg/m   Estimated body mass index is 28.5 kg/m  as calculated from the following:    Height as of this encounter: 1.6 m (5' 3\").    Weight as of this encounter: 73 kg (160 lb 14.4 oz).  Medication Reconciliation: complete      Health Maintenance that is potentially due pending provider review:  NONE    n/a    NAZIA Elliott  "

## 2019-01-23 NOTE — PROGRESS NOTES
SUBJECTIVE:   Izabella Concepcion is a 63 year old female who presents to clinic today for the following health issues:      Chief Complaint   Patient presents with     Results     patient here to discuss DEXA scan results that was done on 12/18        63-year-old female with recent fracture of femur presents to discuss results of recent DEXA scan that showed osteopenia with elevated fracture risk.  Since her fracture, she has changed her lifestyle significantly incorporating weightbearing exercise and dietary calcium and vitamin D.    Problem list and histories reviewed & adjusted, as indicated.  Additional history: as documented    Patient Active Problem List   Diagnosis     Moderate persistent asthma without complication     Statin intolerance     Hyperlipidemia LDL goal <130     Femur fracture, right (H)     Osteopenia, unspecified location     Past Surgical History:   Procedure Laterality Date     ARTHROPLASTY HIP Right 10/21/2018    Procedure: RIGHT TOTAL HIP ARTHROPLASTY;  Surgeon: Ivan Padilla MD;  Location: [facility]     ORTHOPEDIC SURGERY Right     right arm fracture     TONSILLECTOMY         Social History     Tobacco Use     Smoking status: Former Smoker     Packs/day: 2.00     Years: 6.00     Pack years: 12.00     Smokeless tobacco: Never Used   Substance Use Topics     Alcohol use: Yes     Comment: 5-6 drinks per month     Family History   Problem Relation Age of Onset     Cerebrovascular Disease Mother         hemorrhagic stroke     Myocardial Infarction Father 29     Diabetes Father          Current Outpatient Medications   Medication Sig Dispense Refill     aspirin 325 MG tablet Take 1 tablet (325 mg) by mouth daily 30 tablet 0     Multiple Vitamin (MULTI-VITAMINS) TABS Take 1 tablet by mouth daily        order for DME Equipment being ordered: Walker Wheels () and Walker ()  Treatment Diagnosis: Gait instability 1 each 0     Allergies   Allergen Reactions     Blood Transfusion Related  "(Informational Only) Other (See Comments)     Patient has a history of a clinically significant antibody against RBC antigens.  A delay in compatible RBCs may occur.     Vigamox [Moxifloxacin] Swelling       Reviewed and updated as needed this visit by clinical staff       Reviewed and updated as needed this visit by Provider         ROS:      OBJECTIVE:     /75   Pulse 68   Temp 97.8  F (36.6  C) (Tympanic)   Ht 1.6 m (5' 3\")   Wt 73 kg (160 lb 14.4 oz)   SpO2 98%   BMI 28.50 kg/m    Body mass index is 28.5 kg/m .  Well-appearing female in no acute distress        ASSESSMENT/PLAN:   I    1. Osteopenia, unspecified location  Total face to face contact time was greater than 15 minutes of which more than 50% of this time was spent counseling and coordinating care regarding the above topics.  Discussed elevated statistical fracture risk.  Discussed guidelines indicating initiating pharmacotherapy when the fracture risk is greater than 3% or all other fracture risk  Is greater than 20%.  Given these guidelines, she is a candidate for bisphosphonate therapy to help reduce fracture risk.  After discussion of risks and benefits, she asks to have some additional time to maximize lifestyle interventions to help with bone mineral density and to reduce fracture risk.  She was counseled on taking in 1200 to 1500 mg of calcium daily and 800-2000 international units of vitamin D3 daily after discussion.  We decided to recheck a bone mineral density test in December 2019.  She declined a flu shot today.  I recommended the Shingrix vaccine series to her today.        Blanco Wang MD  [facility]  "

## 2019-01-28 ENCOUNTER — TRANSFERRED RECORDS (OUTPATIENT)
Dept: HEALTH INFORMATION MANAGEMENT | Facility: CLINIC | Age: 64
End: 2019-01-28

## 2019-03-19 ENCOUNTER — TRANSFERRED RECORDS (OUTPATIENT)
Dept: HEALTH INFORMATION MANAGEMENT | Facility: CLINIC | Age: 64
End: 2019-03-19

## 2019-10-02 ENCOUNTER — HEALTH MAINTENANCE LETTER (OUTPATIENT)
Age: 64
End: 2019-10-02

## 2019-10-21 ENCOUNTER — TRANSFERRED RECORDS (OUTPATIENT)
Dept: HEALTH INFORMATION MANAGEMENT | Facility: CLINIC | Age: 64
End: 2019-10-21

## 2019-10-21 ENCOUNTER — HOSPITAL ENCOUNTER (OUTPATIENT)
Dept: MAMMOGRAPHY | Facility: CLINIC | Age: 64
Discharge: HOME | End: 2019-10-21
Attending: OBSTETRICS & GYNECOLOGY | Admitting: OBSTETRICS & GYNECOLOGY
Payer: MEDICARE

## 2019-10-21 DIAGNOSIS — Z12.31 VISIT FOR SCREENING MAMMOGRAM: ICD-10-CM

## 2019-10-21 PROCEDURE — 77067 SCR MAMMO BI INCL CAD: CPT

## 2019-10-31 ENCOUNTER — HEALTH MAINTENANCE LETTER (OUTPATIENT)
Age: 64
End: 2019-10-31

## 2019-12-16 ENCOUNTER — HEALTH MAINTENANCE LETTER (OUTPATIENT)
Age: 64
End: 2019-12-16

## 2020-02-09 ENCOUNTER — HOSPITAL ENCOUNTER (EMERGENCY)
Facility: CLINIC | Age: 65
Discharge: HOME | End: 2020-02-09
Attending: NURSE PRACTITIONER | Admitting: NURSE PRACTITIONER
Payer: COMMERCIAL

## 2020-02-09 ENCOUNTER — APPOINTMENT (OUTPATIENT)
Dept: GENERAL RADIOLOGY | Facility: CLINIC | Age: 65
End: 2020-02-09
Attending: NURSE PRACTITIONER
Payer: COMMERCIAL

## 2020-02-09 VITALS
TEMPERATURE: 98.3 F | SYSTOLIC BLOOD PRESSURE: 155 MMHG | BODY MASS INDEX: 26.58 KG/M2 | HEIGHT: 63 IN | WEIGHT: 150 LBS | OXYGEN SATURATION: 99 % | DIASTOLIC BLOOD PRESSURE: 88 MMHG | RESPIRATION RATE: 18 BRPM

## 2020-02-09 DIAGNOSIS — M25.571 PAIN IN JOINT, ANKLE AND FOOT, RIGHT: ICD-10-CM
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
## 2020-02-09 DIAGNOSIS — S82.899A AVULSION FRACTURE OF ANKLE: ICD-10-CM

## 2020-02-09 PROCEDURE — 73610 X-RAY EXAM OF ANKLE: CPT | Mod: RT

## 2020-02-09 PROCEDURE — 27760 CLTX MEDIAL ANKLE FX: CPT | Mod: RT

## 2020-02-09 PROCEDURE — 25000132 ZZH RX MED GY IP 250 OP 250 PS 637: Performed by: NURSE PRACTITIONER

## 2020-02-09 PROCEDURE — 99284 EMERGENCY DEPT VISIT MOD MDM: CPT | Mod: 25

## 2020-02-09 RX ORDER — IBUPROFEN 600 MG/1
600 TABLET, FILM COATED ORAL ONCE
Status: COMPLETED | OUTPATIENT
Start: 2020-02-09 | End: 2020-02-09

## 2020-02-09 RX ORDER — HYDROCODONE BITARTRATE AND ACETAMINOPHEN 5; 325 MG/1; MG/1
1 TABLET ORAL EVERY 6 HOURS PRN
Qty: 12 TABLET | Refills: 0 | Status: SHIPPED | OUTPATIENT
Start: 2020-02-09 | End: 2020-02-12

## 2020-02-09 RX ADMIN — IBUPROFEN 600 MG: 600 TABLET ORAL at 15:41

## 2020-02-09 ASSESSMENT — MIFFLIN-ST. JEOR: SCORE: 1189.53

## 2020-02-09 ASSESSMENT — ENCOUNTER SYMPTOMS
BACK PAIN: 0
NUMBNESS: 0
HEADACHES: 0
NECK PAIN: 0
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
## 2020-02-09 NOTE — ED TRIAGE NOTES
Patient had a fall on ice pta. Patient injured right ankle, able to walk on it afterward. Did not hit head.

## 2020-02-09 NOTE — ED AVS SNAPSHOT
Emergency Department  [address] [facility] [address]  Phone:  [phone]  Fax:  [phone]                                    Izabella Concepcion   MRN: [id number]    Department:   Emergency Department   Date of Visit:  2/9/2020           After Visit Summary Signature Page    I have received my discharge instructions, and my questions have been answered. I have discussed any challenges I see with this plan with the nurse or doctor.    ..........................................................................................................................................  Patient/Patient Representative Signature      ..........................................................................................................................................  Patient Representative Print Name and Relationship to Patient    ..................................................               ................................................  Date                                   Time    ..........................................................................................................................................  Reviewed by Signature/Title    ...................................................              ..............................................  Date                                               Time          22EPIC Rev 08/18

## 2020-02-09 NOTE — ED PROVIDER NOTES
Emergency Department Attending Supervision Note  2/9/2020  4:39 PM    I evaluated this patient in conjunction with Ximena Wilder NP    Briefly, the patient presented with lateral right ankle pain after slipping and falling on the ice around 1245 today. She denies any gait change, additional injury, neck pain, headache, numbness, or loss of consciousness.  She has pain to the right ankle mainly over the lateral aspect and also over the medial aspect.  She has no pain to the foot or the proximal tib-fib region, and she denies any other injuries.    Physical Exam:  Nursing note and vitals reviewed.  Constitutional:  Oriented to person, place, and time. Cooperative.   HENT:   Nose:    Nose normal.   Mouth/Throat:   Mucous membranes are normal.   Eyes:    Conjunctivae normal and EOM are normal.      Pupils are equal, round, and reactive to light.   Neck:    Trachea normal.   Cardiovascular:  Normal rate, regular rhythm, normal heart sounds and normal pulses. No murmur heard.  Pulmonary/Chest:  Effort normal and breath sounds normal.   Abdominal:   Soft. Normal appearance and bowel sounds are normal.      There is no tenderness.      There is no rebound and no CVA tenderness.   Musculoskeletal:  Swelling and tenderness to the bilateral malleolus of the right ankle, with maximal tenderness over the lateral malleolus.  No tenderness to palpation over the right foot or the proximal tib-fib.  Extremities otherwise atraumatic x 4.   Lymphadenopathy:  No cervical adenopathy.   Neurological:   Alert and oriented to person, place, and time. Normal strength.      No cranial nerve deficit or sensory deficit. GCS eye subscore is 4. GCS verbal subscore is 5. GCS motor subscore is 6.   Skin:    Skin is intact. No rash noted.   Psychiatric:   Normal mood and affect.    Results:  Ankle XR, G/E 3 views, right  Small acute avulsion fracture of the tip of the medial malleolus. No definite evidence of a fracture of the lateral or posterior  malleoli. Moderate soft tissue swelling along the lateral aspect of the ankle. The ankle mortise is intact.   Status post subtalar arthrodesis. Small linear ossific density along the medial margin of the distal tibia.  Reading per radiology    My impression is this is a 64-year-old female who came in after she injured her right ankle.  She had x-rays obtained, showing the above avulsion fracture of the distal tibia.  She also likely sprained the lateral aspect of the ankle.  She was subsequently placed in a postoperative boot, and she was provided crutches.  She is going to follow-up with her orthopedic surgeon.    Diagnosis    ICD-10-CM    1. Fall due to slipping on ice or snow, initial encounter W00.9XXA    2. Avulsion fracture of ankle S82.899A    3. Pain in joint, ankle and foot, right M25.571        Scribe Disclosure:  I, Juanita Parks, am serving as a scribe at 4:40 PM on 2/9/2020 to document services personally performed by Juventino Garay MD based on my observations and the provider's statements to me.           Juventino Garay MD  02/09/20 1507

## 2020-02-10 ENCOUNTER — TRANSFERRED RECORDS (OUTPATIENT)
Dept: HEALTH INFORMATION MANAGEMENT | Facility: CLINIC | Age: 65
End: 2020-02-10

## 2020-03-18 ENCOUNTER — VIRTUAL VISIT (OUTPATIENT)
Dept: FAMILY MEDICINE | Facility: OTHER | Age: 65
End: 2020-03-18

## 2020-03-18 NOTE — PROGRESS NOTES
"Date: 2020 08:45:00  Clinician: Adamaris Osborn  Clinician NPI: [id number]  Patient: Izabella Concepcion  Patient : 1955  Patient Address: [address] [place] [address]  Patient Phone: [phone]  Visit Protocol: URI  Patient Summary:  Izabella is a 64 year old ( : 1955 ) female who initiated a Visit for COVID-19 (Coronavirus) evaluation and screening. When asked the question \"Please sign me up to receive news, health information and promotions. \", Izabella responded \"No\".    Izabella states her symptoms started gradually 3-6 days ago. After her symptoms started, they improved and then got worse again.   Her symptoms consist of a cough, malaise, and chills. Izabella also feels feverish but was unable to measure her temperature.   Symptom details   Cough: Izabella coughs a few times an hour and her cough is more bothersome at night. Phlegm does not come into her throat when she coughs. She does not believe her cough is caused by post-nasal drip.    Izabella denies having wheezing, sore throat, nasal congestion, ear pain, headache, rhinitis, enlarged lymph nodes, facial pain or pressure, myalgias, and teeth pain. She also denies taking antibiotic medication for the symptoms, having a sinus infection within the past year, and having recent facial or sinus surgery in the past 60 days. She is not experiencing dyspnea.   Precipitating events  She has not recently been exposed to someone with influenza. Izabella has not been in close contact with any high risk individuals.   Pertinent COVID-19 (Coronavirus) information  Izabella has not traveled internationally or to the areas where COVID-19 (Coronavirus) is widespread in the last 14 days before the start of her symptoms.   Izabella has not had a close contact with a laboratory-confirmed COVID-19 patient within 14 days of symptom onset. She also has not had a close contact with a suspected COVID-19 patient within 14 days of symptom onset.   Izabella is not a " healthcare worker and does not work in a healthcare facility.   Pertinent medical history  Izabella does not get yeast infections when she takes antibiotics.   Izabella does not need a return to work/school note.   Weight: 150 lbs   Izabella does not smoke or use smokeless tobacco.   Weight: 150 lbs    MEDICATIONS: No current medications, ALLERGIES: Vigamox  Clinician Response:  Dear Izabella,   Based on the information you have provided, you do have symptoms that are consistent with Coronavirus (COVID-19).  The coronavirus causes mild to severe respiratory illness with the most common symptoms including fever, cough and difficulty breathing. Unfortunately, many viruses cause similar symptoms and it can be difficult to distinguish between viruses, especially in mild cases, so we are presuming that anyone with cough or fever has coronavirus at this time.  Coronavirus/COVID-19 has reached the point of community spread in [place], meaning that we are finding the virus in people with no known exposure risk for asaf the virus. Given the increasing commonness of coronavirus in the community we are no longer testing patients who are not critically ill.  If you are a health care worker, you should refer to your employee health office for instructions about returning to work.  For everyone else who has cough or fever, you should assume you are infected with coronavirus. Accordingly, you should self-quarantine for seven days from the first day your symptoms started OR 72 hours after your cough and fever completely resolve - WHICHEVER is LONGER. You should call if you find increasing shortness of breath, wheezing or sustained fever above 101.5. If you are significantly short of breath or experience chest pain you should call 911 or report to the nearest emergency department for urgent evaluation.    Isolate yourself at home.   Do Not allow any visitors  Do Not go to work or school  Do Not go to [employer],  centers,  shopping, or other public places.  Do Not shake hands.  Avoid close contact with others (hugging, kissing).   Protect Others:    Cover Your Mouth and Nose with a mask, disposable tissue or wash cloth to avoid spreading germs to others.  Wash your hands and face frequently with soap and water.   If you develop significant shortness of breath that prevents you from doing normal activities, please call 911 or proceed to the nearest emergency room and alert them immediately that you have been in self-isolation for possible coronavirus.   For more information about COVID19 and options for caring for yourself at home, please visit the CDC website at https://www.cdc.gov/coronavirus/2019-ncov/about/steps-when-sick.htmlFor more options for care at [facility], please visit our website at [email or web address]     Diagnosis: Cough  Diagnosis ICD: R05

## 2020-08-21 ENCOUNTER — TELEPHONE (OUTPATIENT)
Dept: FAMILY MEDICINE | Facility: CLINIC | Age: 65
End: 2020-08-21

## 2020-08-21 DIAGNOSIS — T78.40XD ALLERGIC STATE, SUBSEQUENT ENCOUNTER: Primary | ICD-10-CM

## 2020-08-21 NOTE — TELEPHONE ENCOUNTER
PCP,    Pt is looking for a referral to an allergist . Her last OV was 1/23/19. Would you like a virtual visit prior to discuss?    Thank you,  Alon GILES RN

## 2020-08-21 NOTE — TELEPHONE ENCOUNTER
Reason for Call:  Other referral     Detailed comments: Patient is requesting a referral to an Allergist.  Please call her when this is completed.      Phone Number Patient can be reached at: Cell number on file:    Telephone Information:   Mobile [phone]       Best Time: anytime     Can we leave a detailed message on this number? YES    Call taken on 8/21/2020 at 11:25 AM by Misa Garsia MA

## 2020-09-16 ENCOUNTER — TRANSFERRED RECORDS (OUTPATIENT)
Dept: HEALTH INFORMATION MANAGEMENT | Facility: CLINIC | Age: 65
End: 2020-09-16

## 2021-01-15 ENCOUNTER — HEALTH MAINTENANCE LETTER (OUTPATIENT)
Age: 66
End: 2021-01-15

## 2021-03-23 ENCOUNTER — HOSPITAL ENCOUNTER (OUTPATIENT)
Dept: MAMMOGRAPHY | Facility: CLINIC | Age: 66
Discharge: HOME | End: 2021-03-23
Attending: OBSTETRICS & GYNECOLOGY | Admitting: OBSTETRICS & GYNECOLOGY
Payer: MEDICARE

## 2021-03-23 DIAGNOSIS — Z12.31 VISIT FOR SCREENING MAMMOGRAM: ICD-10-CM

## 2021-03-23 PROCEDURE — 77063 BREAST TOMOSYNTHESIS BI: CPT

## 2021-04-28 ENCOUNTER — VIRTUAL VISIT (OUTPATIENT)
Dept: FAMILY MEDICINE | Facility: CLINIC | Age: 66
End: 2021-04-28
Payer: MEDICARE

## 2021-04-28 DIAGNOSIS — G47.10 EXCESSIVE SLEEPINESS: Primary | ICD-10-CM

## 2021-04-28 DIAGNOSIS — E55.9 VITAMIN D DEFICIENCY: ICD-10-CM

## 2021-04-28 DIAGNOSIS — R53.83 OTHER FATIGUE: ICD-10-CM

## 2021-04-28 DIAGNOSIS — Z13.220 LIPID SCREENING: ICD-10-CM

## 2021-04-28 DIAGNOSIS — Z11.52 ENCOUNTER FOR SCREENING FOR COVID-19: ICD-10-CM

## 2021-04-28 PROCEDURE — 99000 SPECIMEN HANDLING OFFICE-LAB: CPT | Performed by: INTERNAL MEDICINE

## 2021-04-28 PROCEDURE — 99214 OFFICE O/P EST MOD 30 MIN: CPT | Mod: 95 | Performed by: INTERNAL MEDICINE

## 2021-04-28 PROCEDURE — 86769 SARS-COV-2 COVID-19 ANTIBODY: CPT | Mod: 90 | Performed by: INTERNAL MEDICINE

## 2021-04-28 NOTE — PROGRESS NOTES
Izabella is a 65 year old who is being evaluated via a billable video visit.      How would you like to obtain your AVS? MyChart  If the video visit is dropped, the invitation should be resent by: Text to cell phone: [phone]  Will anyone else be joining your video visit? No    Video Start Time: 4:51 PM    Assessment & Plan     Excessive sleepiness  I am not sure how to help her with this problem over a video visit.  I think returning to the sleep clinic might be helpful although she has already had 2 sleep studies which did not result in a specific treatment.  - SLEEP EVALUATION & MANAGEMENT REFERRAL - [facility] [phone]  (Age 18 and up); Future    Other fatigue  This is a very nonspecific complaint, however, we can do a laboratory investigation to look for reversible causes of fatigue.  If the symptoms are persistent, I think a face-to-face visit might be necessary to further evaluate.  - CBC with platelets; Future  - Comprehensive metabolic panel; Future  - TSH with free T4 reflex; Future  - Vitamin B12; Future    Encounter for screening for COVID-19    - COVID-19 Wang RBD Sandee & Titer Reflex; Future  - SARS-CoV-2 Nucleocapsid Total Ab; Future    Lipid screening    - Lipid panel reflex to direct LDL Fasting; Future    Vitamin D deficiency    - Vitamin D Deficiency; Future      We spent a lot of time discussing her concerns about Covid, Covid vaccines and Covid treatments.  Many of her views on Covid are not consistent with mainstream medical thought.  I did my best to try to educate her on the importance of Covid vaccines for preventing a potentially deadly illness.        35 minutes spent on the date of the encounter doing chart review, history and exam, documentation and further activities per the note           Return in about 2 weeks (around 5/12/2021) for Lab appointment within 2 weeks, Fasting Lab Only Visit.  Patient instructed to return to clinic or contact us sooner  if symptoms worsen or new symptoms develop.     Blanco Wang MD  [facility] [place]    Gail Parekh is a 65 year old who presents for the following health issues     HPI       She feels fatigued  She wonders if she is getting restorative sleep?  She has had 2 sleep studies that were not revealing in the past  She also wished to discuss a lot of topics regarding the Covid vaccine and Covid treatments with me  She has skepticism about vaccines in general and the Covid vaccine in particular  She wished to explore my thoughts on the use of ivermectin to treat Covid  She wished to discuss the use of vitamin D supplementation to prevent Covid  She requested a Covid antibody test although she has not had a specific illness that was concerning for Covid recently  She has been walking for exercise but has not been doing any strength training  She wishes to have a screening lipid panel      Review of Systems         Objective           Vitals:  No vitals were obtained today due to virtual visit.    Physical Exam   GENERAL: Healthy, alert and no distress  EYES: Eyes grossly normal to inspection.  No discharge or erythema, or obvious scleral/conjunctival abnormalities.  RESP: No audible wheeze, cough, or visible cyanosis.  No visible retractions or increased work of breathing.    SKIN: Visible skin clear. No significant rash, abnormal pigmentation or lesions.  NEURO: Cranial nerves grossly intact.  Mentation and speech appropriate for age.  PSYCH: Mentation appears normal, affect normal/bright, judgement and insight intact, normal speech and appearance well-groomed.                Video-Visit Details    Type of service:  Video Visit    Video End Time:5:23 PM    Originating Location (pt. Location): Home    Distant Location (provider location):  [facility]     Platform used for Video Visit: [employer]

## 2021-05-13 DIAGNOSIS — Z13.220 LIPID SCREENING: ICD-10-CM

## 2021-05-13 DIAGNOSIS — E55.9 VITAMIN D DEFICIENCY: ICD-10-CM

## 2021-05-13 DIAGNOSIS — R53.83 OTHER FATIGUE: ICD-10-CM

## 2021-05-13 DIAGNOSIS — Z11.52 ENCOUNTER FOR SCREENING FOR COVID-19: ICD-10-CM

## 2021-05-13 LAB
ALBUMIN SERPL-MCNC: 4 G/DL (ref 3.4–5)
ALP SERPL-CCNC: 71 U/L (ref 40–150)
ALT SERPL W P-5'-P-CCNC: 16 U/L (ref 0–50)
ANION GAP SERPL CALCULATED.3IONS-SCNC: 4 MMOL/L (ref 3–14)
AST SERPL W P-5'-P-CCNC: 15 U/L (ref 0–45)
BILIRUB SERPL-MCNC: 0.6 MG/DL (ref 0.2–1.3)
BUN SERPL-MCNC: 15 MG/DL (ref 7–30)
CALCIUM SERPL-MCNC: 9.2 MG/DL (ref 8.5–10.1)
CHLORIDE SERPL-SCNC: 107 MMOL/L (ref 94–109)
CHOLEST SERPL-MCNC: 268 MG/DL
CO2 SERPL-SCNC: 28 MMOL/L (ref 20–32)
CREAT SERPL-MCNC: 0.64 MG/DL (ref 0.52–1.04)
ERYTHROCYTE [DISTWIDTH] IN BLOOD BY AUTOMATED COUNT: 13 % (ref 10–15)
GFR SERPL CREATININE-BSD FRML MDRD: >90 ML/MIN/{1.73_M2}
GLUCOSE SERPL-MCNC: 85 MG/DL (ref 70–99)
HCT VFR BLD AUTO: 41.3 % (ref 35–47)
HDLC SERPL-MCNC: 63 MG/DL
HGB BLD-MCNC: 13.8 G/DL (ref 11.7–15.7)
LDLC SERPL CALC-MCNC: 173 MG/DL
MCH RBC QN AUTO: 29.5 PG (ref 26.5–33)
MCHC RBC AUTO-ENTMCNC: 33.4 G/DL (ref 31.5–36.5)
MCV RBC AUTO: 88 FL (ref 78–100)
NONHDLC SERPL-MCNC: 205 MG/DL
PLATELET # BLD AUTO: 303 10E9/L (ref 150–450)
POTASSIUM SERPL-SCNC: 4.1 MMOL/L (ref 3.4–5.3)
PROT SERPL-MCNC: 7.3 G/DL (ref 6.8–8.8)
RBC # BLD AUTO: 4.68 10E12/L (ref 3.8–5.2)
SODIUM SERPL-SCNC: 139 MMOL/L (ref 133–144)
TRIGL SERPL-MCNC: 161 MG/DL
TSH SERPL DL<=0.005 MIU/L-ACNC: 0.64 MU/L (ref 0.4–4)
VIT B12 SERPL-MCNC: 526 PG/ML (ref 193–986)
WBC # BLD AUTO: 6.3 10E9/L (ref 4–11)

## 2021-05-13 PROCEDURE — 36415 COLL VENOUS BLD VENIPUNCTURE: CPT | Performed by: INTERNAL MEDICINE

## 2021-05-13 PROCEDURE — 86769 SARS-COV-2 COVID-19 ANTIBODY: CPT | Performed by: INTERNAL MEDICINE

## 2021-05-13 PROCEDURE — 80053 COMPREHEN METABOLIC PANEL: CPT | Performed by: INTERNAL MEDICINE

## 2021-05-13 PROCEDURE — 85027 COMPLETE CBC AUTOMATED: CPT | Performed by: INTERNAL MEDICINE

## 2021-05-13 PROCEDURE — 84443 ASSAY THYROID STIM HORMONE: CPT | Performed by: INTERNAL MEDICINE

## 2021-05-13 PROCEDURE — 82306 VITAMIN D 25 HYDROXY: CPT | Performed by: INTERNAL MEDICINE

## 2021-05-13 PROCEDURE — 82607 VITAMIN B-12: CPT | Performed by: INTERNAL MEDICINE

## 2021-05-13 PROCEDURE — 99000 SPECIMEN HANDLING OFFICE-LAB: CPT | Performed by: INTERNAL MEDICINE

## 2021-05-13 PROCEDURE — 80061 LIPID PANEL: CPT | Performed by: INTERNAL MEDICINE

## 2021-05-14 LAB
DEPRECATED CALCIDIOL+CALCIFEROL SERPL-MC: 50 UG/L (ref 20–75)
SARS-COV-2 AB PNL SERPL IA: NEGATIVE
SARS-COV-2 IGG SERPL IA-ACNC: NORMAL

## 2021-05-15 LAB — SARS-COV-2 AB SERPL QL IA: NEGATIVE

## 2021-05-15 NOTE — RESULT ENCOUNTER NOTE
The following letter pertains to your most recent diagnostic tests:    Covid antibody testing is negative.      Vitamin D level is normal.    -Your vitamin B12 level is normal.     -TSH (thyroid stimulating hormone) level is normal which indicates normal circulating thyroid hormone levels.      -Liver and gallbladder tests are normal for you. (ALT,AST, Alk phos, bilirubin), kidney function is normal for you (Creatinine, GFR), Sodium is normal, Potassium is normal for you, Calcium is normal for you, Glucose (blood sugar) is normal for you.      -Your cholesterol has not changed much since 3 years ago.  The cholesterol is too high.  You can reduce your total and LDL cholesterol levels by eating less saturated fats.  This means you should eat less fried foods and meat.  If you eat meat, you should try to eat more chicken and fish and less beef or pork.  Also, you should increase dietary fiber intake by eating more fruits, vegetables and whole grains.  A diet high in fiber reduces total and LDL cholesterol levels. Reducing carbohydrates and fats in your diet, losing weight and consuming less alcohol can improve your triglyceride levels.     -Your complete blood counts including your hemoglobin returned normal for you.         Bottom line: These results look okay except for your cholesterol which remains too high.  Feel free to schedule a follow-up appointment with me to discuss strategies for lowering your cholesterol.        Sincerely,    Dr. Wang
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
## 2021-05-28 ENCOUNTER — VIRTUAL VISIT (OUTPATIENT)
Dept: SLEEP MEDICINE | Facility: CLINIC | Age: 66
End: 2021-05-28
Attending: INTERNAL MEDICINE
Payer: MEDICARE

## 2021-05-28 DIAGNOSIS — G47.10 EXCESSIVE SLEEPINESS: ICD-10-CM

## 2021-05-28 DIAGNOSIS — F51.04 CHRONIC INSOMNIA: Primary | ICD-10-CM

## 2021-05-28 DIAGNOSIS — R06.83 SNORING: ICD-10-CM

## 2021-05-28 PROCEDURE — 99205 OFFICE O/P NEW HI 60 MIN: CPT | Mod: 95 | Performed by: PHYSICIAN ASSISTANT

## 2021-05-28 RX ORDER — MULTIVITAMIN WITH IRON
250 TABLET ORAL DAILY
COMMUNITY
Start: 2014-01-01 | End: 2022-12-26

## 2021-05-28 NOTE — PATIENT INSTRUCTIONS
CBT-I Module - Sleep Consolidation Training      Now that you understand a bit more about how sleep works and what causes insomnia, you are ready to move on to the core of CBT-I called Sleep Consolidation Training. It will be important that you continue to keep track of your sleep using your CBTi  Prisca or your paper sleep diary.  This process involves five core strategies that will:    ? Strengthen your sleep drive and biological sleep clock  ? Strengthen the link between your bed and sleep    Core Sleep Consolidation Strategies    Much like physical activity and eating nutritious foods strengthens our body, studies show that the following core strategies are the key to achieving stronger, healthier sleep.     1. Reduce your Time In Bed    Spending extra time in bed trying to get more sleep actually can cause more sleep disruption and weaken sleep efficiency. Sleep efficiency is simply the percentage of time a person spends asleep while in bed. Normal sleep efficiency is thought to be 85% or greater.  By reducing your time in bed, you will be awake longer leading to quicker and deeper sleep. This strategy does not reduce the amount of sleep you get, just the time you are awake in bed:                                                   During the first step of sleep consolidation training you will reduce your time in bed and maintain the following Sleep Schedule Prescription. Your prescription is determined by the average nightly amount of sleep you got over the past two weeks plus 30 minutes. It also takes into account the best time for you to sleep. The least amount of time prescribed is 6 hours in bed.      Your Sleep Schedule Prescription                    Total Time in Bed:  7 hours                  Bedtime:  No earlier than 10 PM                  Wake-up Time:  Out of bed every day by 5 AM. Get bright light exposure to the sun or a SAD lamp 10,000 lux when you wake to help reinforce your morning  circadian wake time. If using the lamp (available online), keep it 1-2 arm lengths from you in your peripheral vision. Avoid staring right at it. Try to get about 30 minutes of exposure. Avoid using it in the evening.     2.  Don't go to bed until you feel sleepy (not tired or fatigued)     This helps increase your sleep drive by keeping you awake longer.  If you go to bed when you're not sleepy, it gives your brain the wrong message and can lead to frustration.     If you feel sleepy before your prescribed bedtime, find activities that can help you stay awake until it is time for you to go to bed. Even brief naps in the evening can be very disruptive of sleep at night.     3.  Don't stay in bed unless you are sleeping      If you are unable to fall asleep or return to sleep after about 30 minutes, get out of bed. This helps train your brain that the bed is for sleep. It is harmful to your sleep when you are worried or frustrated in bed. Do not read, eat, worry, think about sleep, use mobile phones or tablets, or watch TV in bed. Do not watch the clock during the night.     Go to another room and do something relaxing. Plan things you can do when you get out of bed. Avoid use of mobile devices or computers when out of bed.    Return to bed only when you feel sleepy again.  Repeat as often as needed throughout the night.     4.  Get out of bed at the same time every day    Getting up at the same time helps set your biological clock. It is important to stick to your wake time no matter how much sleep you got the night before or how you feel in the morning.    Varying your wake can have the same effect as jet lag.  It disrupts your biological clock and makes you feel more tired and exhausted.  Trying to  catch up  on sleep on the weekends only makes things worse and sets you up for a cycle of poor sleep the following weekdays.      Make sure you set an alarm and keep it away from the bed to prevent looking at the clock  during the night.      5.  Avoid daytime napping    Avoid daytime napping if possible. Napping partially fulfills your need for sleep and weakens your sleep drive at night.    However, if you find yourself sleepy (not just tired) you can take a brief 15-30 minute nap during the day if needed.  Naps within 7-8 hours of your prescribed wake time are less likely to affect your sleep the coming night.  Sleepy people make more mistakes and may hurt themselves or others. Therefore, safety trumps all other sleep prescriptions.  Never drive or operate equipment if drowsy or sleepy.     Changing Your Sleep Schedule Prescription    After one week, you can adjust your sleep schedule prescription based on how well you are sleeping. Use the following guidelines to change your prescribed time in bed based on information from your Sleep Diary, Mobile Sleep Diary Prisca or Wearable Device:          Increase Time in Bed by 15 Minutes IF:    ? Your CBT-I  prisca, Mobile Prisca or Wearable Device indicates your sleep efficiency has been greater than 90%.    ? OR you are falling asleep in less than 30 minutes AND awake less than 30 minutes during the night.              Decrease Time in Bed by 15 Minutes IF:      ? Your CBT-I  Prisca, Mobile Prisca or Wearable Device indicates your sleep efficiency has been less than 80%.       ? OR it is taking longer than 30 minutes to fall asleep OR you are awake more than 30 minutes during the night.      ? DO NOT decrease your sleep schedule below 6 hours unless instructed by your provider.    Change is Challenging    Research shows that making significant changes in sleep habits improves sleep quality and how you feel. Improvement takes time and is not always steady. Change is challenging and can be stressful.  This is especially true if old habits - like spending more time in bed -- were a way to avoid worry about getting enough sleep.

## 2021-07-10 ENCOUNTER — APPOINTMENT (OUTPATIENT)
Dept: GENERAL RADIOLOGY | Facility: CLINIC | Age: 66
End: 2021-07-10
Attending: EMERGENCY MEDICINE
Payer: MEDICARE

## 2021-07-10 ENCOUNTER — NURSE TRIAGE (OUTPATIENT)
Dept: NURSING | Facility: CLINIC | Age: 66
End: 2021-07-10

## 2021-07-10 ENCOUNTER — HOSPITAL ENCOUNTER (EMERGENCY)
Facility: CLINIC | Age: 66
Discharge: HOME | End: 2021-07-10
Attending: EMERGENCY MEDICINE | Admitting: EMERGENCY MEDICINE
Payer: MEDICARE

## 2021-07-10 VITALS
DIASTOLIC BLOOD PRESSURE: 82 MMHG | TEMPERATURE: 97.7 F | SYSTOLIC BLOOD PRESSURE: 126 MMHG | OXYGEN SATURATION: 98 % | RESPIRATION RATE: 17 BRPM | HEART RATE: 74 BPM

## 2021-07-10 DIAGNOSIS — M62.81 GENERALIZED MUSCLE WEAKNESS: ICD-10-CM

## 2021-07-10 LAB
ALBUMIN SERPL-MCNC: 3.4 G/DL (ref 3.4–5)
ALP SERPL-CCNC: 78 U/L (ref 40–150)
ALT SERPL W P-5'-P-CCNC: 19 U/L (ref 0–50)
ANION GAP SERPL CALCULATED.3IONS-SCNC: 1 MMOL/L (ref 3–14)
AST SERPL W P-5'-P-CCNC: 15 U/L (ref 0–45)
BASOPHILS # BLD AUTO: 0 10E9/L (ref 0–0.2)
BASOPHILS NFR BLD AUTO: 0.4 %
BILIRUB SERPL-MCNC: 0.3 MG/DL (ref 0.2–1.3)
BUN SERPL-MCNC: 19 MG/DL (ref 7–30)
CALCIUM SERPL-MCNC: 9.2 MG/DL (ref 8.5–10.1)
CHLORIDE SERPL-SCNC: 106 MMOL/L (ref 94–109)
CO2 SERPL-SCNC: 31 MMOL/L (ref 20–32)
CREAT SERPL-MCNC: 0.66 MG/DL (ref 0.52–1.04)
D DIMER PPP FEU-MCNC: 0.4 UG/ML FEU (ref 0–0.5)
DIFFERENTIAL METHOD BLD: NORMAL
EOSINOPHIL # BLD AUTO: 0.1 10E9/L (ref 0–0.7)
EOSINOPHIL NFR BLD AUTO: 1.1 %
ERYTHROCYTE [DISTWIDTH] IN BLOOD BY AUTOMATED COUNT: 12.3 % (ref 10–15)
GFR SERPL CREATININE-BSD FRML MDRD: >90 ML/MIN/{1.73_M2}
GLUCOSE SERPL-MCNC: 85 MG/DL (ref 70–99)
HCT VFR BLD AUTO: 38.4 % (ref 35–47)
HGB BLD-MCNC: 12.3 G/DL (ref 11.7–15.7)
IMM GRANULOCYTES # BLD: 0 10E9/L (ref 0–0.4)
IMM GRANULOCYTES NFR BLD: 0.2 %
LABORATORY COMMENT REPORT: NORMAL
LYMPHOCYTES # BLD AUTO: 2.3 10E9/L (ref 0.8–5.3)
LYMPHOCYTES NFR BLD AUTO: 23.4 %
MCH RBC QN AUTO: 28.1 PG (ref 26.5–33)
MCHC RBC AUTO-ENTMCNC: 32 G/DL (ref 31.5–36.5)
MCV RBC AUTO: 88 FL (ref 78–100)
MONOCYTES # BLD AUTO: 1.2 10E9/L (ref 0–1.3)
MONOCYTES NFR BLD AUTO: 12 %
NEUTROPHILS # BLD AUTO: 6.3 10E9/L (ref 1.6–8.3)
NEUTROPHILS NFR BLD AUTO: 62.9 %
NRBC # BLD AUTO: 0 10*3/UL
NRBC BLD AUTO-RTO: 0 /100
PLATELET # BLD AUTO: 286 10E9/L (ref 150–450)
POTASSIUM SERPL-SCNC: 3.8 MMOL/L (ref 3.4–5.3)
PROT SERPL-MCNC: 7 G/DL (ref 6.8–8.8)
RBC # BLD AUTO: 4.37 10E12/L (ref 3.8–5.2)
SARS-COV-2 RNA RESP QL NAA+PROBE: NEGATIVE
SODIUM SERPL-SCNC: 138 MMOL/L (ref 133–144)
SPECIMEN SOURCE: NORMAL
TROPONIN I SERPL-MCNC: <0.015 UG/L (ref 0–0.04)
WBC # BLD AUTO: 10 10E9/L (ref 4–11)

## 2021-07-10 PROCEDURE — 84484 ASSAY OF TROPONIN QUANT: CPT | Performed by: EMERGENCY MEDICINE

## 2021-07-10 PROCEDURE — C9803 HOPD COVID-19 SPEC COLLECT: HCPCS

## 2021-07-10 PROCEDURE — U0003 INFECTIOUS AGENT DETECTION BY NUCLEIC ACID (DNA OR RNA); SEVERE ACUTE RESPIRATORY SYNDROME CORONAVIRUS 2 (SARS-COV-2) (CORONAVIRUS DISEASE [COVID-19]), AMPLIFIED PROBE TECHNIQUE, MAKING USE OF HIGH THROUGHPUT TECHNOLOGIES AS DESCRIBED BY CMS-2020-01-R: HCPCS | Performed by: EMERGENCY MEDICINE

## 2021-07-10 PROCEDURE — 85025 COMPLETE CBC W/AUTO DIFF WBC: CPT | Performed by: EMERGENCY MEDICINE

## 2021-07-10 PROCEDURE — 85379 FIBRIN DEGRADATION QUANT: CPT | Performed by: EMERGENCY MEDICINE

## 2021-07-10 PROCEDURE — U0005 INFEC AGEN DETEC AMPLI PROBE: HCPCS | Performed by: EMERGENCY MEDICINE

## 2021-07-10 PROCEDURE — 99285 EMERGENCY DEPT VISIT HI MDM: CPT | Mod: 25

## 2021-07-10 PROCEDURE — 71046 X-RAY EXAM CHEST 2 VIEWS: CPT

## 2021-07-10 PROCEDURE — 96360 HYDRATION IV INFUSION INIT: CPT

## 2021-07-10 PROCEDURE — 258N000003 HC RX IP 258 OP 636: Performed by: EMERGENCY MEDICINE

## 2021-07-10 PROCEDURE — 80053 COMPREHEN METABOLIC PANEL: CPT | Performed by: EMERGENCY MEDICINE

## 2021-07-10 PROCEDURE — 93005 ELECTROCARDIOGRAM TRACING: CPT

## 2021-07-10 RX ORDER — SODIUM CHLORIDE 9 MG/ML
INJECTION, SOLUTION INTRAVENOUS CONTINUOUS
Status: DISCONTINUED | OUTPATIENT
Start: 2021-07-10 | End: 2021-07-10 | Stop reason: HOSPADM

## 2021-07-10 RX ADMIN — SODIUM CHLORIDE 1000 ML: 9 INJECTION, SOLUTION INTRAVENOUS at 16:03

## 2021-07-10 ASSESSMENT — ENCOUNTER SYMPTOMS
DIZZINESS: 1
SORE THROAT: 0
HEADACHES: 1
WEAKNESS: 1

## 2021-07-10 NOTE — ED TRIAGE NOTES
Pt has numerous complaints of fever, chills, chest heaviness, dizziness, right foot pain and swelling. Pt reports not taking very good care of herself and has been ill for the past 2 weeks since June 27.

## 2021-07-10 NOTE — TELEPHONE ENCOUNTER
Izabella said she became ill end of June with sore throat. Thought was allergies. Then worsened with fever. Says COVID negative. Says fever now gone, but continues to be weak. If tries to get up, too much gets cold sweats and is exhausted. Headaches, cold sweats, and chest heaviness. Says has been ill since end of June. Discussed if is experiencing cold sweats and chest heaviness then may need to be evaluated in the ER.           Reason for Disposition    Dizziness or lightheadedness    Additional Information    Negative: Severe difficulty breathing (e.g., struggling for each breath, speaks in single words)    Negative: Difficult to awaken or acting confused (e.g., disoriented, slurred speech)    Negative: Shock suspected (e.g., cold/pale/clammy skin, too weak to stand, low BP, rapid pulse)    Negative: [1] Chest pain lasts > 5 minutes AND [2] history of heart disease  (i.e., heart attack, bypass surgery, angina, angioplasty, CHF; not just a heart murmur)    Negative: [1] Chest pain lasts > 5 minutes AND [2] described as crushing, pressure-like, or heavy    Negative: [1] Chest pain lasts > 5 minutes AND [2] age > 50    Negative: [1] Chest pain lasts > 5 minutes AND [2] age > 30 AND [3] at least one cardiac risk factor (i.e., hypertension, diabetes, obesity, smoker or strong family history of heart disease)    Negative: [1] Chest pain lasts > 5 minutes AND [2] not relieved with nitroglycerin    Negative: Passed out (i.e., lost consciousness, collapsed and was not responding)    Negative: Heart beating < 50 beats per minute OR > 140 beats per minute    Negative: Visible sweat on face or sweat dripping down face    Negative: Sounds like a life-threatening emergency to the triager    Negative: Followed a chest injury    Protocols used: CHEST PAIN-A-
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
## 2021-07-10 NOTE — DISCHARGE INSTRUCTIONS
We have done lab work and chest x-ray with you.  There is no signs of a primary heart or primary lung problem.  Your oxygen levels are 98% your temperature was normal here.  We have tested you for COVID-19 but unfortunately the analyzer is down.  We will call you if this test is positive.  Okay to continue Tylenol or ibuprofen for fever or body aches.  Return to the emergency room with worsening shortness of breath severe chest pain or other concerns.  You continue to not feel well please follow-up with your regular physician.

## 2021-07-11 LAB
ATRIAL RATE - MUSE: 72 BPM
DIASTOLIC BLOOD PRESSURE - MUSE: NORMAL MMHG
INTERPRETATION ECG - MUSE: NORMAL
P AXIS - MUSE: 68 DEGREES
PR INTERVAL - MUSE: 144 MS
QRS DURATION - MUSE: 86 MS
QT - MUSE: 386 MS
QTC - MUSE: 422 MS
R AXIS - MUSE: 3 DEGREES
SYSTOLIC BLOOD PRESSURE - MUSE: NORMAL MMHG
T AXIS - MUSE: 53 DEGREES
VENTRICULAR RATE- MUSE: 72 BPM

## 2021-07-13 ENCOUNTER — PATIENT OUTREACH (OUTPATIENT)
Dept: FAMILY MEDICINE | Facility: CLINIC | Age: 66
End: 2021-07-13

## 2021-07-14 ASSESSMENT — SLEEP AND FATIGUE QUESTIONNAIRES
HOW LIKELY ARE YOU TO NOD OFF OR FALL ASLEEP WHEN YOU ARE A PASSENGER IN A CAR FOR AN HOUR WITHOUT A BREAK: SLIGHT CHANCE OF DOZING
HOW LIKELY ARE YOU TO NOD OFF OR FALL ASLEEP WHILE SITTING QUIETLY AFTER LUNCH WITHOUT ALCOHOL: HIGH CHANCE OF DOZING
HOW LIKELY ARE YOU TO NOD OFF OR FALL ASLEEP WHILE SITTING AND TALKING TO SOMEONE: WOULD NEVER DOZE
HOW LIKELY ARE YOU TO NOD OFF OR FALL ASLEEP WHILE LYING DOWN TO REST IN THE AFTERNOON WHEN CIRCUMSTANCES PERMIT: HIGH CHANCE OF DOZING
HOW LIKELY ARE YOU TO NOD OFF OR FALL ASLEEP IN A CAR, WHILE STOPPED FOR A FEW MINUTES IN TRAFFIC: WOULD NEVER DOZE
HOW LIKELY ARE YOU TO NOD OFF OR FALL ASLEEP WHILE SITTING INACTIVE IN A PUBLIC PLACE: WOULD NEVER DOZE
HOW LIKELY ARE YOU TO NOD OFF OR FALL ASLEEP WHILE WATCHING TV: HIGH CHANCE OF DOZING
HOW LIKELY ARE YOU TO NOD OFF OR FALL ASLEEP WHILE SITTING AND READING: MODERATE CHANCE OF DOZING
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
## 2021-07-16 ENCOUNTER — VIRTUAL VISIT (OUTPATIENT)
Dept: SLEEP MEDICINE | Facility: CLINIC | Age: 66
End: 2021-07-16
Payer: MEDICARE

## 2021-07-16 DIAGNOSIS — R06.83 SNORING: ICD-10-CM

## 2021-07-16 DIAGNOSIS — R40.0 SLEEPINESS: ICD-10-CM

## 2021-07-16 DIAGNOSIS — F51.04 CHRONIC INSOMNIA: Primary | ICD-10-CM

## 2021-07-16 PROCEDURE — 99215 OFFICE O/P EST HI 40 MIN: CPT | Mod: 95 | Performed by: PHYSICIAN ASSISTANT

## 2021-07-16 RX ORDER — ZOLPIDEM TARTRATE 5 MG/1
TABLET ORAL
Qty: 1 TABLET | Refills: 0 | Status: SHIPPED | OUTPATIENT
Start: 2021-07-16 | End: 2022-07-18

## 2021-07-16 NOTE — PATIENT INSTRUCTIONS
Limit time in bed to between 11 PM and 6 AM. Get up right at 6 AM to an alarm every day regardless of your sleep quality that night. Continue to get bright light exposure upon awakening. Avoid screen time in the 30 minutes before bedtime.   Do most of your wind-down routine out of bed. You may read or do some other mildly engaging activity once in bed for 15 minutes to help you distract your mind. If crossword puzzles or word games don't work for you, you might try listening to something so you can have the lights out and eyes closed. If not falling asleep and starting to feel either more alert or frustrated about not sleeping, get out of bed. See below for details.                CBT-I Module - Sleep Consolidation Training      Now that you understand a bit more about how sleep works and what causes insomnia, you are ready to move on to the core of CBT-I called Sleep Consolidation Training. It will be important that you continue to keep track of your sleep using your CBTi  Prisca or your paper sleep diary.  This process involves five core strategies that will:    ? Strengthen your sleep drive and biological sleep clock  ? Strengthen the link between your bed and sleep    Core Sleep Consolidation Strategies    Much like physical activity and eating nutritious foods strengthens our body, studies show that the following core strategies are the key to achieving stronger, healthier sleep.     1. Reduce your Time In Bed    Spending extra time in bed trying to get more sleep actually can cause more sleep disruption and weaken sleep efficiency. Sleep efficiency is simply the percentage of time a person spends asleep while in bed. Normal sleep efficiency is thought to be 85% or greater.  By reducing your time in bed, you will be awake longer leading to quicker and deeper sleep. This strategy does not reduce the amount of sleep you get, just the time you are awake in bed:                                                    During the first step of sleep consolidation training you will reduce your time in bed and maintain the following Sleep Schedule Prescription. Your prescription is determined by the average nightly amount of sleep you got over the past two weeks plus 30 minutes. It also takes into account the best time for you to sleep. The least amount of time prescribed is 6 hours in bed.      Your Sleep Schedule Prescription                    Total Time in Bed:  7 hours                  Bedtime:  No earlier than 11 PM                   Wake-up Time:  Out of bed every day by 6 AM     2.  Don't go to bed until you feel sleepy (not tired or fatigued)     This helps increase your sleep drive by keeping you awake longer.  If you go to bed when you're not sleepy, it gives your brain the wrong message and can lead to frustration.     If you feel sleepy before your prescribed bedtime, find activities that can help you stay awake until it is time for you to go to bed. Even brief naps in the evening can be very disruptive of sleep at night.     3.  Don't stay in bed unless you are sleeping      If you are unable to fall asleep or return to sleep after about 30 minutes, get out of bed. This helps train your brain that the bed is for sleep. It is harmful to your sleep when you are worried or frustrated in bed. Do not read, eat, worry, think about sleep, use mobile phones or tablets, or watch TV in bed. Do not watch the clock during the night.     Go to another room and do something relaxing. Plan things you can do when you get out of bed. Avoid use of mobile devices or computers when out of bed.    Return to bed only when you feel sleepy again.  Repeat as often as needed throughout the night.     4.  Get out of bed at the same time every day    Getting up at the same time helps set your biological clock. It is important to stick to your wake time no matter how much sleep you got the night before or how you feel in the morning.    Varying  your wake can have the same effect as jet lag.  It disrupts your biological clock and makes you feel more tired and exhausted.  Trying to  catch up  on sleep on the weekends only makes things worse and sets you up for a cycle of poor sleep the following weekdays.      Make sure you set an alarm and keep it away from the bed to prevent looking at the clock during the night.      5.  Avoid daytime napping    Avoid daytime napping if possible. Napping partially fulfills your need for sleep and weakens your sleep drive at night.    However, if you find yourself sleepy (not just tired) you can take a brief 15-30 minute nap during the day if needed.  Naps within 7-8 hours of your prescribed wake time are less likely to affect your sleep the coming night.  Sleepy people make more mistakes and may hurt themselves or others. Therefore, safety trumps all other sleep prescriptions.  Never drive or operate equipment if drowsy or sleepy.     Changing Your Sleep Schedule Prescription    After one week, you can adjust your sleep schedule prescription based on how well you are sleeping. Use the following guidelines to change your prescribed time in bed based on information from your Sleep Diary, Mobile Sleep Diary Prisca or Wearable Device:          Increase Time in Bed by 15 Minutes IF:    ? Your CBT-I  prisca, Mobile Prisca or Wearable Device indicates your sleep efficiency has been greater than 90%.    ? OR you are falling asleep in less than 30 minutes AND awake less than 30 minutes during the night.              Decrease Time in Bed by 15 Minutes IF:      ? Your CBT-I  Prisca, Mobile Prisca or Wearable Device indicates your sleep efficiency has been less than 80%.       ? OR it is taking longer than 30 minutes to fall asleep OR you are awake more than 30 minutes during the night.      ? DO NOT decrease your sleep schedule below 6 hours unless instructed by your provider.    Change is Challenging    Research shows that making  significant changes in sleep habits improves sleep quality and how you feel. Improvement takes time and is not always steady. Change is challenging and can be stressful.  This is especially true if old habits - like spending more time in bed -- were a way to avoid worry about getting enough sleep.            CBT-I Supplementary Module - Cognitive Training    Developing Healthy Sleep Thoughts    Insomnia is often is triggered by stressful events such as a change in employment, a separation, medical illness, or loss.  How you handle your sleep problem, mainly your thoughts and behaviors, determines in large part whether your sleeplessness is short term or develops into chronic insomnia well after the stressful event is over.     This  part of your program involves learning a set of skills to change negative and worrisome thoughts about sleep.   Insomnia is more likely to persist if you interpret the onset as a threat or loss of control.  Worry, fears and untrue beliefs about insomnia can become a vicious cycle.  Negative sleep thoughts activate the physical and emotional systems of your body.  This strengthens wakefulness and weakens your sleep system.  This in turn produces increased stress and pressure to sleep.  We call this unhelpful sleep effort.     Changing How You Think About Insomnia    We now know that our thoughts and attitudes affect our stress response.  Negative sleep thoughts can worsen your insomnia. They can lead to greater fear or anxiety about sleep, which in turn can aggravate your sleep problem. Thinking more positively and realistically about your sleep promotes its health and healing.     Myths about Sleep    ? People need 8 hours of sleep     This is untrue.  Sleep needs vary from person to person.  Most people need between 6-8 hours to feel alert during the day.  People who sleep 7 hours live longer than those who sleep 8 hours.  Research also suggests people who sleep 5 hours live longer than  those who sleep 9 hours    ? Insomnia Causes Dementia        While there is lots of research happening looking into various causes of dementia, there is      currently no conclusive evidence that insomnia causes dementia.  We do know that the rate of       Insomnia is higher in a host of health conditions that have been associated with increased      risk of certain types of dementia.  In general, people with primary insomnia perform similar      normal sleepers in tests of neurocognitive ability.    ? Insomnia is the same as sleep deprivation    Sleep deprivation is lack of sleep due to not allowing enough time for sleep.  This is typically not true for insomnia where people have enough time for sleep and even extend their sleep time trying to get more sleep.  Most people with insomnia get about the same amount of sleep as normal sleepers.  The difference is that with insomnia the sleep is fragmented and less consolidated.       You are Getting More Sleep than You Think    Research using objective sleep tests reveal that people with insomnia get an average of one hour more sleep than they think. This is due in part because the brain misperceives Stage 1 and 2 sleep as being awake.  In addition, stress and arousal while awake in bed changes the brain's perception of time awake.    Examples of Unhealthy Sleep Thoughts    Negative sleep thoughts can have a profound impact on your ability to get a good night's sleep. Below are some examples of negative thoughts associated with insomnia that may sound familiar to you:    ? I must get 8 hours of sleep to function during the day.  ? I won't get to sleep tonight.  ? Insomnia is going to cause health problems.  ? I am dreading going to bed.  ? I woke up early again.  I know I won't get back to sleep.  ? The reason I feel terrible today is because of my insomnia.  ? I've totally lost control of my sleep.  ? I can't sleep without a sleeping pill.    Negative thoughts usually  occur automatically and feel like a knee-jerk reaction.  They are often untrue or distorted, especially late in the evening as you become increasingly tired and others are asleep.      Changing Unhealthy Sleep Thoughts    Now that you understand the impact that negative thoughts can have on your sleep, you are ready to change these unhelpful thoughts.  The strategy is powerful and simple:  By recognizing and replacing your negative thoughts about sleep with more accurate, positive thoughts, you will be less anxious and frustrated about your sleep. A more realistic and positive attitude about sleep will allow you to relax and sleep more easily through the night.           There are several important steps involved in changing your unhelpful and negative thoughts about sleep:            Examples of Healthy Sleep Thoughts    ? My work will not suffer much if I have a poor night's sleep.    ? I'm probably getting more sleep than I think.    ? Other things than my sleep affect my daytime functioning.    ? Because I didn't sleep well last night, I am more likely to sleep well tonight because of increased sleep drive that leads to deeper sleep.    ? Sleep requirements vary from one person to another.    ? If I don't sleep well, most of the time the worst that can happen is that my mood might not be as bright the next day.    ? If I awaken after about 5 hours of sleep, I have gotten the core sleep I need for the day.    ? I'm more likely to fall asleep the longer I've been awake    ? I'm more likely to fall asleep as my body temperature begins to decrease through the night.    ? My body's wakefulness system takes charge during the day to promote daytime functioning.    ? These sleep skills have worked for others, and they can work for me.    Other Recommendations for Changing Beliefs and Attitudes   About Your Sleep    ? Keep Realistic Expectations     There is a widespread belief that 8 hours of sleep is necessary to feel  refreshed and function well during the day. Many believe that good sleep means never waking up at night.  Others come to expect that they should always wake up in the morning feeling full of energy.  Concerns may arise when your actual sleep falls short of these expectations. Try to avoid placing undue pressure on yourself to achieve certain sleep levels.  It only increases anxiety about sleeping.  Focus on quality sleep not quantity of sleep.    ? Revise Your Thoughts about the Causes of Insomnia      There is a natural tendency to attribute our sleep problems completely to external factors such as a chemical imbalance, pain, aging or things over which we may have little control.  Although these factors may contribute to your insomnia, research shows CBT-I is beneficial even if they are present.    ? Don't Blame Insomnia for All Daytime Impairments      Many individuals blame insomnia for every symptom or concern they experience during the day from fatigue to lack of concentration. Though poor sleep may produce some of these symptoms, it us usually untrue that all daytime impairment is attributable to insomnia.  It is more often that other factors such as stress and co-occurring medical problems contribute more to how you feel during the day.      ? Don't Catastrophize      Catastrophic thinking means making a sleep mountain out of a molehill.  Some people believe poor sleep will have catastrophic consequences to their physical health, mental health and appearance. Others see insomnia as a complete loss of control.  These perceived consequences of insomnia often prompt people to seek medication or other treatment.  Keep in mind insomnia can be very unpleasant but for the most part is not dangerous.    ? Don't Focus on Sleep     Some people reduce their activity level because of poor sleep.  Although sleep is a necessary part of life, don't make it the focus of your thoughts and concern. Trust that if you engage in  health sleep habits, your body will give you the sleep it needs.  Make sure you continue your normal activities despite your insomnia.    ? Never Try to Sleep      Of all the habits the most important one is this:  Never try to force yourself to sleep.  Doing so usually backfires and makes things worse. Instead, focus on keeping to your prescribed sleep schedule, getting up at the same time every day and using the bed only for sleep.    What are Your Three Top Negative Thoughts About Your Sleep?    Negative Thought                        Resulting Feeling                   Alternate Thought  I must get 8 hours of sleep                   Fear                      People need differing amounts of sleep and   every night                                                                         sleep time varies somewhat each night    1.__________________________________________________________________________________________    2.__________________________________________________________________________________________    3.__________________________________________________________________________________________

## 2021-07-20 NOTE — TELEPHONE ENCOUNTER
Patient has Follow-up appointment on 7/23 with PCP Dr. Doran scheduled.    Lilian Tinsley RN  [facility]

## 2021-07-22 NOTE — NURSING NOTE
PSG scheduled. Patient was driving unable to schedule follow up. Informed patient to call the scheduling line phone sent via [employer] with information on sleep study.           Favio Ashley [facility] [place]

## 2021-07-23 ENCOUNTER — OFFICE VISIT (OUTPATIENT)
Dept: FAMILY MEDICINE | Facility: CLINIC | Age: 66
End: 2021-07-23
Payer: MEDICARE

## 2021-07-23 VITALS
DIASTOLIC BLOOD PRESSURE: 80 MMHG | WEIGHT: 150 LBS | OXYGEN SATURATION: 98 % | HEART RATE: 67 BPM | BODY MASS INDEX: 26.58 KG/M2 | HEIGHT: 63 IN | TEMPERATURE: 97 F | SYSTOLIC BLOOD PRESSURE: 118 MMHG

## 2021-07-23 DIAGNOSIS — E78.5 HYPERLIPIDEMIA LDL GOAL <130: ICD-10-CM

## 2021-07-23 DIAGNOSIS — M25.571 PAIN IN JOINT, ANKLE AND FOOT, RIGHT: ICD-10-CM

## 2021-07-23 DIAGNOSIS — M25.551 HIP PAIN, RIGHT: Primary | ICD-10-CM

## 2021-07-23 DIAGNOSIS — R94.31 ABNORMAL ELECTROCARDIOGRAM: ICD-10-CM

## 2021-07-23 PROCEDURE — 99214 OFFICE O/P EST MOD 30 MIN: CPT | Performed by: INTERNAL MEDICINE

## 2021-07-23 ASSESSMENT — MIFFLIN-ST. JEOR: SCORE: 1184.53

## 2021-07-23 NOTE — PROGRESS NOTES
"    Assessment & Plan     Hip pain, right (chronic)  Pain in joint, ankle and foot, right (acute on chronic)  Recommend PT for strengthening. If worsening or not improving, recommend RTC and would consider xray.   - Physical Therapy Referral    Hyperlipidemia LDL goal <130  Abnormal electrocardiogram  Recommend cardio eval and recs  Hx statin intolerance  - Adult Cardiology Eval Referral      Return in about 1 year (around 7/23/2022) for Routine preventive, with me, in person, sooner if symptoms worsen or do not improve.    Shawanda Doran DO  [facility] CASSANDRA Parekh is a 65 year old who presents for the following health issues    HPI     Former PCP: josue  Problem list and medications reviewed and updated. See below for additional notes.  Social: nonsmoker    ED f/u: States getting strength back. Not quite back to baseline. No f/c. No cough. Better    2018-fell and broke R hip.   2019-slipped and fell, R ankle    States has started having R hip/ankle pain again, was in bed x 3 weeks and noticed more R sided leg flares of pain. No swelling. Ddimer was normal.  1.5 months ago might have hurt foot again[place]. New tight shoes.   3/10 when walking.    Planning for sleep study    Abnormal EKG in ED, possible anterior infarct in the past. Has tried two statins-simvastatin and another one, GI upset and body aches.     Review of Systems   Constitutional, HEENT, cardiovascular, pulmonary, gi and gu systems are negative, except as otherwise noted.      Objective    /80 (BP Location: Left arm, Patient Position: Chair, Cuff Size: Adult Large)   Pulse 67   Temp 97  F (36.1  C) (Oral)   Ht 1.6 m (5' 3\")   Wt 68 kg (150 lb)   SpO2 98%   Breastfeeding No   BMI 26.57 kg/m    Body mass index is 26.57 kg/m .  Physical Exam     GENERAL APPEARANCE: AAOx3, no distress. Well developed.   MSK: R ankle with ROM intact, no edema; dorsal superior aspect with mild tenderness to " deep palpation. Gait intact.    EXT: No c/c/e in lower extremities b/l.     PSYCH: appropriate mood and affect.

## 2021-07-24 ASSESSMENT — ASTHMA QUESTIONNAIRES: ACT_TOTALSCORE: 25

## 2021-09-04 ENCOUNTER — HEALTH MAINTENANCE LETTER (OUTPATIENT)
Age: 66
End: 2021-09-04

## 2021-10-05 ENCOUNTER — OFFICE VISIT (OUTPATIENT)
Dept: CARDIOLOGY | Facility: CLINIC | Age: 66
End: 2021-10-05
Attending: INTERNAL MEDICINE
Payer: MEDICARE

## 2021-10-05 VITALS — OXYGEN SATURATION: 96 % | HEART RATE: 63 BPM | BODY MASS INDEX: 28.44 KG/M2 | HEIGHT: 63 IN | WEIGHT: 160.5 LBS

## 2021-10-05 DIAGNOSIS — Z82.49 FAMILY HISTORY OF ISCHEMIC HEART DISEASE: Primary | ICD-10-CM

## 2021-10-05 DIAGNOSIS — E78.5 HYPERLIPIDEMIA LDL GOAL <130: ICD-10-CM

## 2021-10-05 DIAGNOSIS — R94.31 ABNORMAL ELECTROCARDIOGRAM: ICD-10-CM

## 2021-10-05 PROCEDURE — 99203 OFFICE O/P NEW LOW 30 MIN: CPT | Performed by: INTERNAL MEDICINE

## 2021-10-05 PROCEDURE — 93000 ELECTROCARDIOGRAM COMPLETE: CPT | Performed by: INTERNAL MEDICINE

## 2021-10-05 RX ORDER — CHOLECALCIFEROL (VITAMIN D3) 25 MCG
CAPSULE ORAL
COMMUNITY
End: 2023-01-05

## 2021-10-05 ASSESSMENT — MIFFLIN-ST. JEOR: SCORE: 1232.15

## 2021-10-05 NOTE — PROGRESS NOTES
Service Date: 10/05/2021    REFERRING PROVIDER:  Dr. Shawanda Doran.    HISTORY OF PRESENT ILLNESS:  Ms. Concepcion is a very pleasant 65-year-old female with a history of hyperlipidemia and strong family history of premature coronary disease and abnormal EKG.  She was referred to our office for this.  She denies any current symptoms that she is concerned about such as exertional chest pain or difficulty breathing.  Her last cholesterol profile in May showed a total cholesterol 268, HDL 63,  and triglycerides 161.  She has been tried on simvastatin and Vytorin previously and had some major side effects with this.  She has been reluctant to trying any other statin therapy for her cholesterol.  She has a very remote history of tobacco abuse for about 6 years in her teenage years.  Otherwise, she has been a nonsmoker.  She has no underlying history of diabetes or high blood pressure or previous heart disease.  She did have a calcium scoring back in 2010 and it was 0 at that time.    PHYSICAL EXAMINATION:  Today she is normotensive.  Heart rate in the 60s.  Weight 160.  Her body mass index is 28. Carotid upstrokes were brisk without bruit.  Cardiovascular tones are regular without murmur, gallop or rub.  Lungs are clear posteriorly.  She has strong and symmetric pulses in the upper and lower extremities without peripheral edema.    In summary, Ms. Concepcion is a very pleasant 65-year-old female with a history of hyperlipidemia, untreated, strong family history of premature coronary disease and abnormal EKG.  I did review her electrocardiogram from her primary doctor's office and then we did repeat it again today.  She has an RSR pattern actually in V1 and V2.  It was slightly less prominent on her previous EKG, which the computer read as an anterior infarct.  This is actually just a normal variant with an RSR or incomplete right bundle branch block pattern.  She does have a little bit of sagging ST segments in the  lateral leads and a slight left axis deviation.  This was seen previously as well.  She is asymptomatic from a cardiac perspective, but she definitely has significant risk factors. It would be beneficial I think for her to repeat a calcium score and see if it has changed in the last 11 years.  In fact, I would consider repeating this every 5-10 years if she remains untreated with her hyperlipidemia.  She is agreeable to undergoing this and we will have her follow up with the results and discuss any management options for her hyperlipidemia, especially if this number has changed.  I did recommend a Mediterranean-style diet for her for reduction of cardiovascular risk and also she seems motivated in making some lifestyle changes including diet, exercise and weight loss.  We will see her back with the CT results.    Please feel free to contact me with any questions you have in regards to her care.  Thank you for allowing me to participate in the care of your very nice patient.    Nallely Kearney DO    cc:  Shawanda Doran MD  [address], [address]  [address]      Nallely Kearney DO        D: 10/05/2021   T: 10/05/2021   MT: kelli    Name:     TERRENCE GURROLACassius  MRN:      [id number]        Account:      [id number]   :      1955           Service Date: 10/05/2021       Document: [id number]
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
## 2021-10-06 ENCOUNTER — HOSPITAL ENCOUNTER (OUTPATIENT)
Dept: CARDIOLOGY | Facility: CLINIC | Age: 66
Discharge: HOME | End: 2021-10-06
Attending: INTERNAL MEDICINE | Admitting: INTERNAL MEDICINE
Payer: MEDICARE

## 2021-10-06 DIAGNOSIS — E78.5 HYPERLIPIDEMIA LDL GOAL <130: ICD-10-CM

## 2021-10-06 DIAGNOSIS — R94.31 ABNORMAL ELECTROCARDIOGRAM: ICD-10-CM

## 2021-10-06 DIAGNOSIS — Z82.49 FAMILY HISTORY OF ISCHEMIC HEART DISEASE: ICD-10-CM

## 2021-10-06 PROCEDURE — 75571 CT HRT W/O DYE W/CA TEST: CPT | Mod: 26 | Performed by: INTERNAL MEDICINE

## 2021-10-06 PROCEDURE — G1004 CDSM NDSC: HCPCS | Performed by: INTERNAL MEDICINE

## 2021-10-06 PROCEDURE — 75571 CT HRT W/O DYE W/CA TEST: CPT | Mod: MG

## 2021-11-08 ENCOUNTER — TRANSFERRED RECORDS (OUTPATIENT)
Dept: HEALTH INFORMATION MANAGEMENT | Facility: CLINIC | Age: 66
End: 2021-11-08
Payer: MEDICARE

## 2022-01-28 ENCOUNTER — TRANSFERRED RECORDS (OUTPATIENT)
Dept: MULTI SPECIALTY CLINIC | Facility: CLINIC | Age: 67
End: 2022-01-28

## 2022-02-19 ENCOUNTER — HEALTH MAINTENANCE LETTER (OUTPATIENT)
Age: 67
End: 2022-02-19

## 2022-03-02 ENCOUNTER — TELEPHONE (OUTPATIENT)
Dept: CARDIOLOGY | Facility: CLINIC | Age: 67
End: 2022-03-02
Payer: MEDICARE

## 2022-03-02 NOTE — TELEPHONE ENCOUNTER
[facility] Call Center    Phone Message    May a detailed message be left on voicemail: yes     Reason for Call: Symptoms or Concerns     If patient has red-flag symptoms, warm transfer to triage line    Current symptom or concern: pressure in body, heart rate is elevated when sleeping, BP a bit high. Last week her blood pressure was 179/90 at night. Usually its around 110.     Symptoms have been present for:  3 day(s)    Has patient previously been seen for this? No        Date:     Are there any new or worsening symptoms? Yes:  Pt is concerned with the increase in heart rate and BP. States she has started a sleep aide.       Action Taken: Other: routed to [facility]    Travel Screening: Not Applicable

## 2022-03-02 NOTE — TELEPHONE ENCOUNTER
Spoke with pt about recent BP issues and follow up.   Pt reports her BP this morning was 133/70 - pt is not on a medication that would affect her BP.   Informed pt that since she never had follow up after her CT calcium scan in October then she could see an JUAN sooner than waiting to see Dr. Kearney.   Pt would prefer that and was scheduled on 3/8/22.  Informed pt to keep track of her BPs and symptoms. Pt gave verbal understanding.

## 2022-03-06 NOTE — PROGRESS NOTES
Cardiology Clinic Progress Note    Service Date: March 8, 2022    Primary Cardiologist: Dr. Kearney      Reason for Visit: Follow up for elevated blood pressure     HPI:   I had the pleasure of meeting Ms. Izabella Concepcion in the clinic today. She is a very pleasant 66 year old female with a past medical history notable for hyperlipidemia and strong family history of premature coronary disease in her father and brother. She was noted to have an abnormal EKG at her primary care doctor's office. She was referred to cardiology for this in October 2021 and met with Dr. Kearney in consultation.     Her last lipid profile in May 2021 showed a total cholesterol 268, HDL 63,  and triglycerides 161. She has been tried on simvastatin and Vytorin previously and had some major side effects with this including muscle aches/pains and GI issues. She has been reluctant to trying any other statin therapy for her cholesterol. She has a very remote history of tobacco abuse for about 6 years in her teenage years but has not smoked beyond this. She has no underlying history of diabetes or high blood pressure or previous heart disease. She had coronary calcium scoring back in 2010 and it was 0 at that time.    EKG has shown an RSR pattern in V1 and V2 which is a normal variant with incomplete right bundle branch block pattern.    Repeat CT coronary calcium scan was completed on 10/06/21 showing a total Agatston calcium score of 16.7, all in the left anterior descending artery. This places the patient in the 59th percentile when compared to age and gender matched control group.    More recently, the patient called into the clinic noting that her blood pressure was running high on a few checks on her home monitor. 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 This has been very fun for her but has increased her stress level some. As a result, she feels she has been eating  more junk food and has gained about 10 pounds over the past 6-8 months. She has also been more tired and sleeping poorly. She tried taking an over the counter sleep aid called relaxium-sleep. After 3 tablets of this, she awoke feeling poorly and got a blood pressure of 179/90 which is unusual for her as it typically runs closer to in the 110-120 systolic range. She stopped taking the sleep aid and her blood pressure has been better since.    Today, Ms. Concepcion presents to the clinic in follow up of the recent elevated blood pressure readings and for review of her CT calcium scan results.She notes mild exertional dyspnea at times but attributes this to being less physically active and exercising less over the past couple of months. She otherwise denies symptoms of chest pain, palpitations, orthopnea, PND, or lower extremity edema. She has not had palpitations, dizziness, presyncope, or syncope.     ASSESSMENT:  1. Mildly elevated coronary artery calcium score  2. Hyperlipidemia  3. Elevated blood pressure reading without diagnosis of hypertension  4. Family history of premature coronary artery disease    PLAN:  - Reviewed the patient's CT calcium scan findings with her in detail today. Recommend she start aspirin 81 mg daily.   - Ideally, would suggest starting at least low dose rosuvastatin 10 mg daily and increase this as tolerated to push her LDL cholesterol below 70 to decrease risk of cardiovascular events going forward. As noted above, she had side effects with simvastatin and Vytorin previously so she is very reluctant to try another statin today. Counseled on Mediterranean style diet and starting an exercise program to try to lose some weight. She notes she had previously been on more of a Keto style diet and feels this has worsened her cholesterol levels.  - Blood pressure is improved in the clinic today. Recommend she continue to monitor this periodically at home and call if it begins to run consisently  over 130/85.   - Recommend she avoid the over-the-counter sleep aid she was using and try to improve sleep hygiene. If she continues to have difficulty sleeping, follow up with PCP for further evaluation and management.    Thank you for the opportunity to participate in this pleasant patient's care. I will plan to have her see Dr. Kearney in follow up 3-4 months with a repeat fasting lipid panel beforehand. We would be happy to see her sooner if needed for any concerns in the meantime.     45 total minutes was spent today including chart review, precharting, history and exam, post visit documentation, and reviewing studies as outlined above.     LUIGI Echeverria, CNP   Nurse Practitioner  [facility]  Pager: [phone]  Text Page  (8am - 5pm, M-F)    Orders this Visit:  Orders Placed This Encounter   Procedures     Lipid panel reflex to direct LDL Fasting     Follow-Up with Cardiology     Orders Placed This Encounter   Medications     aspirin (ASA) 81 MG chewable tablet     Sig: Take 1 tablet (81 mg) by mouth daily     Dispense:  90 tablet     Refill:  3     There are no discontinued medications.  Encounter Diagnoses   Name Primary?     Elevated coronary artery calcium score Yes     Dyslipidemia      Elevated blood pressure reading without diagnosis of hypertension      Family history of premature coronary artery disease        CURRENT MEDICATIONS:  Current Outpatient Medications   Medication Sig Dispense Refill     aspirin (ASA) 81 MG chewable tablet Take 1 tablet (81 mg) by mouth daily 90 tablet 3     Cholecalciferol (VITAMIN D-3) 25 MCG (1000 UT) CAPS 6000 Units daily       Multiple Vitamin (MULTI-VITAMINS) TABS Take 1 tablet by mouth daily        Multiple Vitamins-Minerals (ZINC PO)        magnesium 250 MG tablet Take 250 mg by mouth daily (Patient not taking: Reported on 3/8/2022)       zolpidem (AMBIEN) 5 MG tablet Take tablet by mouth 15 minutes prior to sleep, for Sleep Study (Patient  not taking: Reported on 10/5/2021) 1 tablet 0     ALLERGIES  Allergies   Allergen Reactions     Blood Transfusion Related (Informational Only) Other (See Comments)     Patient has a history of a clinically significant antibody against RBC antigens.  A delay in compatible RBCs may occur.     Dogs Itching     Nickel      Other reaction(s): Other (see comments)  Unknown.      Nickel (non food)     Pollen Extract      Other reaction(s): Other (see comments)  unknown     Vigamox [Moxifloxacin] Swelling       PAST MEDICAL, SURGICAL, FAMILY HISTORY:  History was reviewed and updated as needed, see medical record.    SOCIAL HISTORY:  Social History     Socioeconomic History     Marital status:      Spouse name: Not on file     Number of children: Not on file     Years of education: Not on file     Highest education level: Not on file   Occupational History     Not on file   Tobacco Use     Smoking status: Former Smoker     Packs/day: 2.00     Years: 6.00     Pack years: 12.00     Smokeless tobacco: Never Used   Substance and Sexual Activity     Alcohol use: Yes     Comment: 0-5 drinks per month     Drug use: No     Sexual activity: Never   Other Topics Concern     Parent/sibling w/ CABG, MI or angioplasty before 65F 55M? Yes   Social History Narrative     Not on file     Social Determinants of Health     Financial Resource Strain: Not on file   Food Insecurity: Not on file   Transportation Needs: Not on file   Physical Activity: Not on file   Stress: Not on file   Social Connections: Not on file   Intimate Partner Violence: Not on file   Housing Stability: Not on file     Review of Systems:  Skin:  Positive for lumps or bumps   Eyes:  Negative    ENT:  Negative    Respiratory:  Positive for shortness of breath;wheezing  Cardiovascular:  Negative;chest pain;syncope or near-syncope;cyanosis;dizziness palpitations;Positive for  Gastroenterology:      Genitourinary:  Negative    Musculoskeletal:  Negative    Neurologic:  " Positive for headaches  Psychiatric:  Negative    Heme/Lymph/Imm:  Negative    Endocrine:  Negative       Physical Exam:  Vitals: /83 (BP Location: Left arm, Cuff Size: Adult Large)   Pulse 71   Ht 1.6 m (5' 3\")   Wt 77.6 kg (171 lb)   SpO2 96%   BMI 30.29 kg/m     Wt Readings from Last 4 Encounters:   03/08/22 77.6 kg (171 lb)   10/05/21 72.8 kg (160 lb 8 oz)   07/23/21 68 kg (150 lb)   02/09/20 68 kg (150 lb)     CONSTITUTIONAL: Appears her stated age, well nourished, and in no acute distress.  HEENT: Pupils equal, round. Sclerae nonicteric.    NECK: Supple, no masses or JVD appreciated.   C/V:  Regular rate and rhythm, normal S1 and S2, no S3 or S4, no murmur, rub or gallop.   RESP: Respirations are unlabored. Lungs are clear to auscultation bilaterally without wheezing, rales, or rhonchi.  EXTREM: No clubbing, cyanosis, or lower extremity edema bilaterally.   NEURO: Alert and oriented, cooperative. Gait steady. No gross focal deficits.   PSYCH: Affect appropriate. Mentation normal. Responds to questions appropriately.  SKIN: Warm and dry. No apparent rashes or bruising.     Recent Lab Results:  LIPID RESULTS:  Lab Results   Component Value Date    CHOL 268 (H) 05/13/2021    HDL 63 05/13/2021     (H) 05/13/2021    TRIG 161 (H) 05/13/2021     LIVER ENZYME RESULTS:  Lab Results   Component Value Date    AST 15 07/10/2021    ALT 19 07/10/2021     CBC RESULTS:  Lab Results   Component Value Date    WBC 10.0 07/10/2021    RBC 4.37 07/10/2021    HGB 12.3 07/10/2021    HCT 38.4 07/10/2021    MCV 88 07/10/2021    MCH 28.1 07/10/2021    MCHC 32.0 07/10/2021    RDW 12.3 07/10/2021     07/10/2021     BMP RESULTS:  Lab Results   Component Value Date     07/10/2021    POTASSIUM 3.8 07/10/2021    CHLORIDE 106 07/10/2021    CO2 31 07/10/2021    ANIONGAP 1 (L) 07/10/2021    GLC 85 07/10/2021    BUN 19 07/10/2021    CR 0.66 07/10/2021    GFRESTIMATED >90 07/10/2021    GFRESTBLACK >90 07/10/2021    " BRITTANY 9.2 07/10/2021      CC  Nallely Kearney, DO  [address] [place] [address]    This note was completed in part using Dragon voice recognition software. Although reviewed after completion, some word and grammatical errors may occur.

## 2022-03-06 NOTE — PATIENT INSTRUCTIONS
Thank you for your visit with the [facility] today.    Today's plan:   1. Start taking aspirin 81 mg daily.   2. Continue to get regular exercise and try to stick to a heart healthy diet.  3. Continue to monitor your blood pressure periodically and keep a log of your readings.   4. Follow up with Dr. Kearney in about 3-4 months with a fasting lab check beforehand to recheck your cholesterol levels.    If you have questions or concerns, please do not hesitate to call my nurse team at [phone].     Scheduling phone number: [phone]    It was a pleasure seeing you today!     LUIGI Echeverria, CNP  Nurse Practitioner  [facility]  March 8, 2022  ________________________________________________________\  Patient Education     Mediterranean Diet  A heart healthy eating plan  The Mediterranean Diet is based on the eating habits of people in countries near the [place]. People living in this part of the world have long lives and low rates of chronic diseases. They have lower rates of death from heart disease, cancer and other illnesses.  When you follow this eating plan you'll have better control of your blood sugar and weight. The plan requires simple changes in your habits of eating, and the whole family can take part.  Mediterranean lifestyle   Enjoy eating with others. Sit at the table with family and friends and enjoy your meal. When you eat slowly, you are able to tune in to your body's hunger and fullness signals. You're less likely to overeat.  Be physically active: Being active every day is important for overall good health. Run, walk, dance and do lighter activities such as house and yard work. Move more and sit less!  Drink plenty of water during the day.  Drink red wine with meals in modest amounts (optional).  The Mediterranean Pyramid   The pyramid shows the food groups and the amounts eaten in relation to the whole diet. It is more than a diet; it  "is also a life-style plan.  The largest food group at bottom contains plant foods: vegetables, fruits, grains, nuts, legumes, seeds, olives and olive oil. These foods make up the largest part of your meals.   The next groups above: fish and seafood, then poultry, cheese, eggs and yogurt are eaten less often and in smaller servings.   The top group, meats and sweets, are eaten the least often and in the smallest amounts.    Tips for adding plant foods to your meals   Vegetables and fruits:     Aim for 3 to 8 servings each day. A serving is   to 2 cups, depending on the food.    Choose a variety of colors. Big green salads are a great way to include several vegetable servings.    Try fresh fruit as a dessert: oranges, grapes, apples pomegranates or fresh figs.    At home keep fresh fruit in a bowl to tempt family.    Bring fruit and vegetables to work for a snack.  Oil     Replace butter and margarine with healthy fats such as olive oil. Other plant-based oils are canola, walnut and peanut oil. These are high in good fats. Use them in cooking, salad dressings and baking.    Drizzle your bread with olive oil instead of butter or margarine. Use herbs and spices to add flavor and aroma and to reduce fat and salt when cooking.  Whole grains    Look for the term \"whole\" or \"whole grain\" on the package. Processing grains removes vitamins, minerals and fiber. Whole grains may include: corn, wheat, oats, rye, rice and barley.    Examples of Mediterranean grains include: barley, farro, buckwheat, bulgur, couscous, and wheatberries.    Slowly switch to a whole grain by using whole-grain blends of pastas and rice. Or mix whole grains with refined; for example, mix whole-wheat pasta with white pasta.  Beans and legumes     Beans are a good source of protein and fiber, adding flavor and texture to dishes. Examples include cannellini beans, chickpea, gerry beans, green beans, kidney beans, lentils and split peas.    Cook a " vegetarian meal one night a week: use beans or legumes with vegetables and grains.    Nuts are high in healthy fats. Try walnuts, almonds, pine nuts, hazelnuts and cashews. Avoid candied, honey-roasted and heavily salted nuts.    Limit your intake of nuts to a small handful each day. Explore ways to add to nuts to salads and other dishes.  Tips for using fish and seafood in your meals    Aim for meals with fish or shellfish at least twice a week.    Tuna, herring, salmon and sardines are rich in heart-healthy omega-3. Shellfish, such as mussels, oysters and clams, have similar benefits for brain and heart health.  Tips for using poultry, eggs and cheese and yogurt in your meals    Eat poultry or eggs at least twice a week.    Roast, broil or grill your poultry. Season with fresh or dried herbs.    Enjoy low-fat cheese or yogurt every day.  Tips for using red meat and sweets in your meals     Limit lean red meat to one time a week or 4 times a month.    Red meat has more saturated fats. Choose a lean cut, like top loin, sirloin, flank steak, strip steak or 90% lean ground beef.    Limit portions to 3 to 4 ounces.    Make whole grains and vegetables the main focus of a meal. Add meat in small amounts for flavor.    Limit sweets such as ice cream or cookies for a special times or holidays.  Snacks    Snack on a handful of almonds, walnuts or sunflower seeds in place of chips, cookies or other processed snack foods.    Calcium-rich, low-fat cheese or low- and nonfat plain yogurt with fresh fruit are healthy snacks that are easy to take with you.  Like to know more?  For tips on shoppping, cooking and eating well the Mediterranean way, go to the [employer] website at www.[employer].  For informational purposes only. Not to replace the advice of your health care provider.   Copyright   2014 [employer]. All rights reserved.   Mediterranean pyramrid used with permission of the [employer]. [employer]  019318 - 09/15.

## 2022-03-08 ENCOUNTER — OFFICE VISIT (OUTPATIENT)
Dept: CARDIOLOGY | Facility: CLINIC | Age: 67
End: 2022-03-08
Payer: MEDICARE

## 2022-03-08 VITALS
HEIGHT: 63 IN | OXYGEN SATURATION: 96 % | HEART RATE: 71 BPM | BODY MASS INDEX: 30.3 KG/M2 | WEIGHT: 171 LBS | SYSTOLIC BLOOD PRESSURE: 123 MMHG | DIASTOLIC BLOOD PRESSURE: 83 MMHG

## 2022-03-08 DIAGNOSIS — R03.0 ELEVATED BLOOD PRESSURE READING WITHOUT DIAGNOSIS OF HYPERTENSION: ICD-10-CM

## 2022-03-08 DIAGNOSIS — R93.1 ELEVATED CORONARY ARTERY CALCIUM SCORE: Primary | ICD-10-CM

## 2022-03-08 DIAGNOSIS — Z82.49 FAMILY HISTORY OF PREMATURE CORONARY ARTERY DISEASE: ICD-10-CM

## 2022-03-08 DIAGNOSIS — E78.5 DYSLIPIDEMIA: ICD-10-CM

## 2022-03-08 PROCEDURE — 99215 OFFICE O/P EST HI 40 MIN: CPT | Performed by: NURSE PRACTITIONER

## 2022-03-08 RX ORDER — ASPIRIN 81 MG/1
81 TABLET, CHEWABLE ORAL DAILY
Qty: 90 TABLET | Refills: 3 | Status: SHIPPED | OUTPATIENT
Start: 2022-03-08 | End: 2023-01-05

## 2022-03-08 NOTE — LETTER
3/8/2022    Shawanda Doran DO  [address] [place] [address]    RE: Izabella Concepcion       Dear Colleague,     I had the pleasure of seeing Izabella Concepcion in the [facility].    Cardiology Clinic Progress Note    Service Date: March 8, 2022    Primary Cardiologist: Dr. Kearney      Reason for Visit: Follow up for elevated blood pressure     HPI:   I had the pleasure of meeting Ms. Izabella Concepcion in the clinic today. She is a very pleasant 66 year old female with a past medical history notable for hyperlipidemia and strong family history of premature coronary disease in her father and brother. She was noted to have an abnormal EKG at her primary care doctor's office. She was referred to cardiology for this in October 2021 and met with Dr. Kearney in consultation.     Her last lipid profile in May 2021 showed a total cholesterol 268, HDL 63,  and triglycerides 161. She has been tried on simvastatin and Vytorin previously and had some major side effects with this including muscle aches/pains and GI issues. She has been reluctant to trying any other statin therapy for her cholesterol. She has a very remote history of tobacco abuse for about 6 years in her teenage years but has not smoked beyond this. She has no underlying history of diabetes or high blood pressure or previous heart disease. She had coronary calcium scoring back in 2010 and it was 0 at that time.    EKG has shown an RSR pattern in V1 and V2 which is a normal variant with incomplete right bundle branch block pattern.    Repeat CT coronary calcium scan was completed on 10/06/21 showing a total Agatston calcium score of 16.7, all in the left anterior descending artery. This places the patient in the 59th percentile when compared to age and gender matched control group.    More recently, the patient called into the clinic noting that her blood pressure was running high on a few checks on her home monitor. She recently welcomed her  Order placed.  Message routed by to imaging.   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 This has been very fun for her but has increased her stress level some. As a result, she feels she has been eating more junk food and has gained about 10 pounds over the past 6-8 months. She has also been more tired and sleeping poorly. She tried taking an over the counter sleep aid called relaxium-sleep. After 3 tablets of this, she awoke feeling poorly and got a blood pressure of 179/90 which is unusual for her as it typically runs closer to in the 110-120 systolic range. She stopped taking the sleep aid and her blood pressure has been better since.    Today, Ms. Concepcion presents to the clinic in follow up of the recent elevated blood pressure readings and for review of her CT calcium scan results.She notes mild exertional dyspnea at times but attributes this to being less physically active and exercising less over the past couple of months. She otherwise denies symptoms of chest pain, palpitations, orthopnea, PND, or lower extremity edema. She has not had palpitations, dizziness, presyncope, or syncope.     ASSESSMENT:  1. Mildly elevated coronary artery calcium score  2. Hyperlipidemia  3. Elevated blood pressure reading without diagnosis of hypertension  4. Family history of premature coronary artery disease    PLAN:  - Reviewed the patient's CT calcium scan findings with her in detail today. Recommend she start aspirin 81 mg daily.   - Ideally, would suggest starting at least low dose rosuvastatin 10 mg daily and increase this as tolerated to push her LDL cholesterol below 70 to decrease risk of cardiovascular events going forward. As noted above, she had side effects with simvastatin and Vytorin previously so she is very reluctant to try another statin today. Counseled on Mediterranean style diet and starting an exercise program to try to lose some weight. She notes she had previously been on more of a Keto style diet and feels  this has worsened her cholesterol levels.  - Blood pressure is improved in the clinic today. Recommend she continue to monitor this periodically at home and call if it begins to run consisently over 130/85.   - Recommend she avoid the over-the-counter sleep aid she was using and try to improve sleep hygiene. If she continues to have difficulty sleeping, follow up with PCP for further evaluation and management.    Thank you for the opportunity to participate in this pleasant patient's care. I will plan to have her see Dr. Kearney in follow up 3-4 months with a repeat fasting lipid panel beforehand. We would be happy to see her sooner if needed for any concerns in the meantime.     45 total minutes was spent today including chart review, precharting, history and exam, post visit documentation, and reviewing studies as outlined above.     LUIGI Echeverria, CNP   Nurse Practitioner  [facility]  Pager: [phone]  Text Page  (8am - 5pm, M-F)    Orders this Visit:  Orders Placed This Encounter   Procedures     Lipid panel reflex to direct LDL Fasting     Follow-Up with Cardiology     Orders Placed This Encounter   Medications     aspirin (ASA) 81 MG chewable tablet     Sig: Take 1 tablet (81 mg) by mouth daily     Dispense:  90 tablet     Refill:  3     There are no discontinued medications.  Encounter Diagnoses   Name Primary?     Elevated coronary artery calcium score Yes     Dyslipidemia      Elevated blood pressure reading without diagnosis of hypertension      Family history of premature coronary artery disease        CURRENT MEDICATIONS:  Current Outpatient Medications   Medication Sig Dispense Refill     aspirin (ASA) 81 MG chewable tablet Take 1 tablet (81 mg) by mouth daily 90 tablet 3     Cholecalciferol (VITAMIN D-3) 25 MCG (1000 UT) CAPS 6000 Units daily       Multiple Vitamin (MULTI-VITAMINS) TABS Take 1 tablet by mouth daily        Multiple Vitamins-Minerals (ZINC PO)        magnesium  250 MG tablet Take 250 mg by mouth daily (Patient not taking: Reported on 3/8/2022)       zolpidem (AMBIEN) 5 MG tablet Take tablet by mouth 15 minutes prior to sleep, for Sleep Study (Patient not taking: Reported on 10/5/2021) 1 tablet 0     ALLERGIES  Allergies   Allergen Reactions     Blood Transfusion Related (Informational Only) Other (See Comments)     Patient has a history of a clinically significant antibody against RBC antigens.  A delay in compatible RBCs may occur.     Dogs Itching     Nickel      Other reaction(s): Other (see comments)  Unknown.      Nickel (non food)     Pollen Extract      Other reaction(s): Other (see comments)  unknown     Vigamox [Moxifloxacin] Swelling       PAST MEDICAL, SURGICAL, FAMILY HISTORY:  History was reviewed and updated as needed, see medical record.    SOCIAL HISTORY:  Social History     Socioeconomic History     Marital status:      Spouse name: Not on file     Number of children: Not on file     Years of education: Not on file     Highest education level: Not on file   Occupational History     Not on file   Tobacco Use     Smoking status: Former Smoker     Packs/day: 2.00     Years: 6.00     Pack years: 12.00     Smokeless tobacco: Never Used   Substance and Sexual Activity     Alcohol use: Yes     Comment: 0-5 drinks per month     Drug use: No     Sexual activity: Never   Other Topics Concern     Parent/sibling w/ CABG, MI or angioplasty before 65F 55M? Yes   Social History Narrative     Not on file     Social Determinants of Health     Financial Resource Strain: Not on file   Food Insecurity: Not on file   Transportation Needs: Not on file   Physical Activity: Not on file   Stress: Not on file   Social Connections: Not on file   Intimate Partner Violence: Not on file   Housing Stability: Not on file     Review of Systems:  Skin:  Positive for lumps or bumps   Eyes:  Negative    ENT:  Negative    Respiratory:  Positive for shortness of  "breath;wheezing  Cardiovascular:  Negative;chest pain;syncope or near-syncope;cyanosis;dizziness palpitations;Positive for  Gastroenterology:      Genitourinary:  Negative    Musculoskeletal:  Negative    Neurologic:  Positive for headaches  Psychiatric:  Negative    Heme/Lymph/Imm:  Negative    Endocrine:  Negative       Physical Exam:  Vitals: /83 (BP Location: Left arm, Cuff Size: Adult Large)   Pulse 71   Ht 1.6 m (5' 3\")   Wt 77.6 kg (171 lb)   SpO2 96%   BMI 30.29 kg/m     Wt Readings from Last 4 Encounters:   03/08/22 77.6 kg (171 lb)   10/05/21 72.8 kg (160 lb 8 oz)   07/23/21 68 kg (150 lb)   02/09/20 68 kg (150 lb)     CONSTITUTIONAL: Appears her stated age, well nourished, and in no acute distress.  HEENT: Pupils equal, round. Sclerae nonicteric.    NECK: Supple, no masses or JVD appreciated.   C/V:  Regular rate and rhythm, normal S1 and S2, no S3 or S4, no murmur, rub or gallop.   RESP: Respirations are unlabored. Lungs are clear to auscultation bilaterally without wheezing, rales, or rhonchi.  EXTREM: No clubbing, cyanosis, or lower extremity edema bilaterally.   NEURO: Alert and oriented, cooperative. Gait steady. No gross focal deficits.   PSYCH: Affect appropriate. Mentation normal. Responds to questions appropriately.  SKIN: Warm and dry. No apparent rashes or bruising.     Recent Lab Results:  LIPID RESULTS:  Lab Results   Component Value Date    CHOL 268 (H) 05/13/2021    HDL 63 05/13/2021     (H) 05/13/2021    TRIG 161 (H) 05/13/2021     LIVER ENZYME RESULTS:  Lab Results   Component Value Date    AST 15 07/10/2021    ALT 19 07/10/2021     CBC RESULTS:  Lab Results   Component Value Date    WBC 10.0 07/10/2021    RBC 4.37 07/10/2021    HGB 12.3 07/10/2021    HCT 38.4 07/10/2021    MCV 88 07/10/2021    MCH 28.1 07/10/2021    MCHC 32.0 07/10/2021    RDW 12.3 07/10/2021     07/10/2021     BMP RESULTS:  Lab Results   Component Value Date     07/10/2021    POTASSIUM 3.8 " 07/10/2021    CHLORIDE 106 07/10/2021    CO2 31 07/10/2021    ANIONGAP 1 (L) 07/10/2021    GLC 85 07/10/2021    BUN 19 07/10/2021    CR 0.66 07/10/2021    GFRESTIMATED >90 07/10/2021    GFRESTBLACK >90 07/10/2021    BRITTANY 9.2 07/10/2021      CC  Nallely Kearney,   [address] [place] [address]    This note was completed in part using Dragon voice recognition software. Although reviewed after completion, some word and grammatical errors may occur.      Thank you for allowing me to participate in the care of your patient.      Sincerely,     Uri Kendall NP     [facility]  cc:   No referring provider defined for this encounter.

## 2022-06-11 ENCOUNTER — HEALTH MAINTENANCE LETTER (OUTPATIENT)
Age: 67
End: 2022-06-11

## 2022-07-14 ENCOUNTER — LAB (OUTPATIENT)
Dept: LAB | Facility: CLINIC | Age: 67
End: 2022-07-14

## 2022-07-14 ENCOUNTER — TELEPHONE (OUTPATIENT)
Dept: CARDIOLOGY | Facility: CLINIC | Age: 67
End: 2022-07-14

## 2022-07-14 ENCOUNTER — OFFICE VISIT (OUTPATIENT)
Dept: CARDIOLOGY | Facility: CLINIC | Age: 67
End: 2022-07-14
Payer: MEDICARE

## 2022-07-14 VITALS
HEIGHT: 63 IN | BODY MASS INDEX: 29.62 KG/M2 | WEIGHT: 167.2 LBS | OXYGEN SATURATION: 95 % | DIASTOLIC BLOOD PRESSURE: 72 MMHG | SYSTOLIC BLOOD PRESSURE: 128 MMHG | HEART RATE: 72 BPM

## 2022-07-14 DIAGNOSIS — Z82.49 FAMILY HISTORY OF PREMATURE CORONARY ARTERY DISEASE: ICD-10-CM

## 2022-07-14 DIAGNOSIS — E78.5 DYSLIPIDEMIA: ICD-10-CM

## 2022-07-14 DIAGNOSIS — R93.1 ELEVATED CORONARY ARTERY CALCIUM SCORE: ICD-10-CM

## 2022-07-14 LAB
CHOLEST SERPL-MCNC: 290 MG/DL
FASTING STATUS PATIENT QL REPORTED: YES
HDLC SERPL-MCNC: 66 MG/DL
LDLC SERPL CALC-MCNC: 191 MG/DL
NONHDLC SERPL-MCNC: 224 MG/DL
TRIGL SERPL-MCNC: 165 MG/DL

## 2022-07-14 PROCEDURE — 36415 COLL VENOUS BLD VENIPUNCTURE: CPT | Performed by: NURSE PRACTITIONER

## 2022-07-14 PROCEDURE — 80061 LIPID PANEL: CPT | Performed by: NURSE PRACTITIONER

## 2022-07-14 PROCEDURE — 99214 OFFICE O/P EST MOD 30 MIN: CPT | Performed by: INTERNAL MEDICINE
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
## 2022-07-14 NOTE — PROGRESS NOTES
Service Date: 07/14/2022    CLINIC FOLLOWUP VISIT    REFERRING PROVIDER:  Dr. Shawanda Doran    HISTORY OF PRESENT ILLNESS:  Ms. Concepcion is a pleasant 66-year-old female with a history of familial hyperlipidemia, strong family history of premature coronary disease and evidence of subclinical coronary disease on CT coronary calcium scoring.  She is here for a followup visit.  Upon our last visit in March, we had recommended a baby aspirin and low-dose rosuvastatin for her familial hyperlipidemia.  She was scheduled for a followup visit today with a repeat fasting lipid profile.  Ms. Concepcion has had significant side effects with starting cholesterol agents in the past and has been very reluctant to trying anything new.  She did not start the rosuvastatin for this reason.  Her total cholesterol today was 290, HDL 66, LDL has gone up even further to 191 and triglycerides 165.  She otherwise is feeling healthy.  She seems motivated with lifestyle changes to reduce her cholesterol but she has not made significant headway in this manner.  She has lost a little bit of weight, about 4 pounds since March.  She really would like to achieve a goal weight of around 150.  She is otherwise feeling healthy.  No symptoms.    PHYSICAL EXAMINATION:    VITAL SIGNS:  Blood pressure today was 128/72.  She has been monitoring her blood pressure and has had some elevated numbers but she was taking a nonsteroidal anti-inflammatory at that time and I have advised her that she should avoid these including excluding Tylenol which she may take.  Her pulse today was 72, weight 167, body mass index of 29.      Physical exam findings are otherwise unchanged.      SUMMARY:  Ms. Concepcion is a very pleasant 66-year-old female with a history of familial hyperlipidemia, untreated right now, and a family history of premature coronary disease.  Some elevated blood pressures, likely related to the use of nonsteroidal anti-inflammatories.  For her familial  hyperlipidemia, once again, she is very reluctant to trying any prescription medication including statins; however, she is willing to try over-the-counter supplements.  I have suggested CholestOff.  In addition to continued efforts with weight loss, diet and exercise, I would like to refer her to a nutritionist.  We will try to order that for her through her insurance.  I would recommend followup in about a year with a repeat fasting lipid profile.  I have also asked her to continue to monitor her blood pressures once again, avoiding nonsteroidal anti-inflammatories.  I will be happy to continue to follow her annually or as needed.    Please feel free to contact me with any questions you have in regards to her care.    Nallely Kearney DO    cc:  Shawanda Doran DO   [address] [place] [address]    Nallely Kearney DO        D: 2022   T: 2022   MT: amador    Name:     TERRENCE GURROLA  MRN:      [id number]        Account:      [id number]   :      1955           Service Date: 2022       Document: [id number]
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
## 2022-07-14 NOTE — TELEPHONE ENCOUNTER
Dr. Kearney requested we order nutritional referral for patient. Unfortunately patient did not meet the qualifying dx for this referral. RN called patient to update her that the cost would be out of pocket and provided her with our business office phone number to determine cost. Patient will call us back should she want us to place the referral. Patient aware that she would have to sign waiver if she elects to proceed with nutrition referral through us.

## 2022-07-18 ENCOUNTER — OFFICE VISIT (OUTPATIENT)
Dept: FAMILY MEDICINE | Facility: CLINIC | Age: 67
End: 2022-07-18
Payer: MEDICARE

## 2022-07-18 VITALS
TEMPERATURE: 97.4 F | OXYGEN SATURATION: 97 % | DIASTOLIC BLOOD PRESSURE: 83 MMHG | RESPIRATION RATE: 16 BRPM | SYSTOLIC BLOOD PRESSURE: 130 MMHG | WEIGHT: 166 LBS | BODY MASS INDEX: 29.41 KG/M2 | HEIGHT: 63 IN | HEART RATE: 66 BPM

## 2022-07-18 DIAGNOSIS — Z00.00 ROUTINE HISTORY AND PHYSICAL EXAMINATION OF ADULT: Primary | ICD-10-CM

## 2022-07-18 DIAGNOSIS — G47.00 INSOMNIA, UNSPECIFIED TYPE: ICD-10-CM

## 2022-07-18 DIAGNOSIS — E78.5 HYPERLIPIDEMIA LDL GOAL <130: ICD-10-CM

## 2022-07-18 DIAGNOSIS — Z12.11 SCREEN FOR COLON CANCER: ICD-10-CM

## 2022-07-18 LAB
HCT VFR BLD AUTO: 43 % (ref 35–47)
HGB BLD-MCNC: 14.1 G/DL (ref 11.7–15.7)

## 2022-07-18 PROCEDURE — 80048 BASIC METABOLIC PNL TOTAL CA: CPT | Performed by: INTERNAL MEDICINE

## 2022-07-18 PROCEDURE — 85014 HEMATOCRIT: CPT | Performed by: INTERNAL MEDICINE

## 2022-07-18 PROCEDURE — 85018 HEMOGLOBIN: CPT | Performed by: INTERNAL MEDICINE

## 2022-07-18 PROCEDURE — 36415 COLL VENOUS BLD VENIPUNCTURE: CPT | Performed by: INTERNAL MEDICINE

## 2022-07-18 PROCEDURE — 99214 OFFICE O/P EST MOD 30 MIN: CPT | Mod: 25 | Performed by: INTERNAL MEDICINE

## 2022-07-18 PROCEDURE — G0438 PPPS, INITIAL VISIT: HCPCS | Performed by: INTERNAL MEDICINE

## 2022-07-18 RX ORDER — TRAZODONE HYDROCHLORIDE 50 MG/1
25-50 TABLET, FILM COATED ORAL
Qty: 30 TABLET | Refills: 0 | Status: SHIPPED | OUTPATIENT
Start: 2022-07-18 | End: 2023-01-05

## 2022-07-18 RX ORDER — MULTIVITAMIN WITH IRON
TABLET ORAL
COMMUNITY
Start: 2014-01-01 | End: 2022-12-26

## 2022-07-18 ASSESSMENT — ENCOUNTER SYMPTOMS
FEVER: 0
PALPITATIONS: 1
DIZZINESS: 0
BREAST MASS: 0
HEARTBURN: 0
JOINT SWELLING: 0
HEADACHES: 0
ABDOMINAL PAIN: 0
SHORTNESS OF BREATH: 0
WEAKNESS: 0
CONSTIPATION: 0
NAUSEA: 0
FREQUENCY: 0
HEMATOCHEZIA: 0
MYALGIAS: 0
EYE PAIN: 0
COUGH: 0
HEMATURIA: 0
CHILLS: 0
DYSURIA: 0
SORE THROAT: 0
NERVOUS/ANXIOUS: 0
DIARRHEA: 0
PARESTHESIAS: 1
ARTHRALGIAS: 0

## 2022-07-18 ASSESSMENT — ASTHMA QUESTIONNAIRES: ACT_TOTALSCORE: 25

## 2022-07-18 ASSESSMENT — ACTIVITIES OF DAILY LIVING (ADL): CURRENT_FUNCTION: NO ASSISTANCE NEEDED

## 2022-07-18 NOTE — PROGRESS NOTES
"SUBJECTIVE:   Izabella Concepcion is a 66 year old female who presents for Preventive Visit.      Patient has been advised of split billing requirements and indicates understanding: Yes  Are you in the first 12 months of your Medicare coverage?  No    Healthy Habits:     In general, how would you rate your overall health?  Good    Frequency of exercise:  2-3 days/week    Duration of exercise:  45-60 minutes    Do you usually eat at least 4 servings of fruit and vegetables a day, include whole grains    & fiber and avoid regularly eating high fat or \"junk\" foods?  Yes    Taking medications regularly:  Not Applicable    Medication side effects:  Not applicable    Ability to successfully perform activities of daily living:  No assistance needed    Home Safety:  No safety concerns identified    Hearing Impairment:  No hearing concerns    In the past 6 months, have you been bothered by leaking of urine?  No    In general, how would you rate your overall mental or emotional health?  Good      PHQ-2 Total Score: 0    Additional concerns today:  Yes    Do you feel safe in your environment? Yes    Have you ever done Advance Care Planning? (For example, a Health Directive, POLST, or a discussion with a medical provider or your loved ones about your wishes): Yes, patient states has an Advance Care Planning document and will bring a copy to the clinic.       Fall risk  Fallen 2 or more times in the past year?: No  Any fall with injury in the past year?: No    Cognitive Screening   1) Repeat 3 items (Leader, Season, Table)    2) Clock draw: NORMAL  3) 3 item recall: Recalls 3 objects  Results: 3 items recalled: COGNITIVE IMPAIRMENT LESS LIKELY    Mini-CogTM Copyright JANAE Chaparro. Licensed by the author for use in [facility]; reprinted with permission (yen@.[facility]). All rights reserved.      Do you have sleep apnea, excessive snoring or daytime drowsiness?: no    Reviewed and updated as needed this visit by clinical " staff   Tobacco  Allergies  Meds   Med Hx  Surg Hx  Fam Hx  Soc Hx          Reviewed and updated as needed this visit by Provider                   Social History     Tobacco Use     Smoking status: Former Smoker     Packs/day: 2.00     Years: 6.00     Pack years: 12.00     Smokeless tobacco: Never Used   Substance Use Topics     Alcohol use: Yes     Comment: 0-5 drinks per month     If you drink alcohol do you typically have >3 drinks per day or >7 drinks per week? No    Alcohol Use 7/18/2022   Prescreen: >3 drinks/day or >7 drinks/week? No   Prescreen: >3 drinks/day or >7 drinks/week? -               Current providers sharing in care for this patient include:   Patient Care Team:  Shawanda Doran DO as PCP - General (Internal Medicine)  Pam Lester, Nato (Psychology)  Goltz, Bennett Ezra, PA-C as Assigned Neuroscience Provider  Shawanda Doran DO as Assigned PCP  Uri Kendall NP as Assigned Heart and Vascular Provider    The following health maintenance items are reviewed in Epic and correct as of today:  Health Maintenance Due   Topic Date Due     ANNUAL REVIEW OF HM ORDERS  Never done     ASTHMA ACTION PLAN  Never done     COVID-19 Vaccine (1) Never done     Pneumococcal Vaccine: 65+ Years (1 - PCV) Never done     HEPATITIS C SCREENING  Never done     ZOSTER IMMUNIZATION (1 of 2) Never done     LUNG CANCER SCREENING  Never done     COLORECTAL CANCER SCREENING  01/13/2022       Breast CA Risk Assessment (FHS-7) 7/18/2022   Did any of your first-degree relatives have breast or ovarian cancer? No   Did any of your relatives have bilateral breast cancer? No   Did any man in your family have breast cancer? No   Did any woman in your family have breast and ovarian cancer? Yes   Did any woman in your family have breast cancer before age 50 y? No   Do you have 2 or more relatives with breast and/or ovarian cancer? No   Do you have 2 or more relatives with breast and/or bowel cancer? Yes       Mammogram  "scheduled soon  Cscope completed at [place] earlier this year-she relays she has report and [facility]  She declines PCV20/Shingrix/COVID vaccines  She is going out of country in October, would like nucleocapsid test to see if she carries any immunity (No hx of COVID vaccine), asymptomatic. States she had regeneron last year.  She has insomnia, melatonin not effective, inquires about rx options      10 point ROS of systems including Constitutional, Eyes, Respiratory, Cardiovascular, Gastroenterology, Genitourinary, Integumentary, Muscularskeletal, Psychiatric were all negative except for pertinent positives noted in my HPI.      OBJECTIVE:   /83 (BP Location: Right arm, Patient Position: Sitting, Cuff Size: Adult Regular)   Pulse 66   Temp 97.4  F (36.3  C)   Resp 16   Ht 1.6 m (5' 3\")   Wt 75.3 kg (166 lb)   SpO2 97%   BMI 29.41 kg/m   Estimated body mass index is 29.41 kg/m  as calculated from the following:    Height as of this encounter: 1.6 m (5' 3\").    Weight as of this encounter: 75.3 kg (166 lb).  Physical Exam    GENERAL APPEARANCE: AAOx3, no distress. Well developed.    RESP: Lungs CTA bilaterally. No w/r/r. No distress     CV: RRR, S1/S2 present. No m/r/c.     ABDOMEN:  soft, nontender, no distention. No rebound or guarding.     EXT: No c/c/e in lower extremities b/l. No rashes or deformities noted.    PSYCH: appropriate mood and affect.         ASSESSMENT / PLAN:   Izabella was seen today for physical.    Diagnoses and all orders for this visit:    Routine history and physical examination of adult   Declines vaccines including PCV20/Shingrix/COVID   She would like COVID antibody testing (nucleocapsid) to check on immunity status as she is traveling internationally. I did report that I can order this but I am unclear on insurance coverage and potential associated costs. She declines today and will check with her insurance first.     Screen for colon cancer   UTD earlier this year at " "[facility], she will send us records.    Hyperlipidemia LDL goal <130   Statin intolerance and now untreated per patient preference, aware of risks/benefits, working on lifestyle and will follow-up with cardio in one year    CAC score 16 2021    Insomnia, unspecified type  Discussed medication options. She is agreeable to trial trazodone, low dose and will let me know if desires to taper up or if any significant SE.  -     Hemoglobin and hematocrit; Future  -     Basic metabolic panel  (Ca, Cl, CO2, Creat, Gluc, K, Na, BUN); Future  -     traZODone (DESYREL) 50 MG tablet; Take 0.5-1 tablets (25-50 mg) by mouth nightly as needed for sleep        Estimated body mass index is 29.41 kg/m  as calculated from the following:    Height as of this encounter: 1.6 m (5' 3\").    Weight as of this encounter: 75.3 kg (166 lb).      She reports that she has quit smoking. She has a 12.00 pack-year smoking history. She has never used smokeless tobacco.        Counseling Resources:  ATP IV Guidelines  Pooled Cohorts Equation Calculator  Breast Cancer Risk Calculator  Breast Cancer: Medication to Reduce Risk  FRAX Risk Assessment  ICSI Preventive Guidelines  Dietary Guidelines for Americans, 2010  USDA's MyPlate  ASA Prophylaxis  Lung CA Screening    Shawanda Doran DO  [facility]      "

## 2022-07-19 LAB
ANION GAP SERPL CALCULATED.3IONS-SCNC: 8 MMOL/L (ref 3–14)
BUN SERPL-MCNC: 18 MG/DL (ref 7–30)
CALCIUM SERPL-MCNC: 9.4 MG/DL (ref 8.5–10.1)
CHLORIDE BLD-SCNC: 105 MMOL/L (ref 94–109)
CO2 SERPL-SCNC: 25 MMOL/L (ref 20–32)
CREAT SERPL-MCNC: 0.61 MG/DL (ref 0.52–1.04)
GFR SERPL CREATININE-BSD FRML MDRD: >90 ML/MIN/1.73M2
GLUCOSE BLD-MCNC: 80 MG/DL (ref 70–99)
POTASSIUM BLD-SCNC: 4.3 MMOL/L (ref 3.4–5.3)
SODIUM SERPL-SCNC: 138 MMOL/L (ref 133–144)

## 2022-10-17 ENCOUNTER — TRANSFERRED RECORDS (OUTPATIENT)
Dept: HEALTH INFORMATION MANAGEMENT | Facility: CLINIC | Age: 67
End: 2022-10-17

## 2022-10-22 ENCOUNTER — HEALTH MAINTENANCE LETTER (OUTPATIENT)
Age: 67
End: 2022-10-22

## 2022-12-22 ENCOUNTER — NURSE TRIAGE (OUTPATIENT)
Dept: FAMILY MEDICINE | Facility: CLINIC | Age: 67
End: 2022-12-22

## 2022-12-22 NOTE — TELEPHONE ENCOUNTER
Nurse Triage SBAR    Is this a 2nd Level Triage? YES, LICENSED PRACTITIONER REVIEW IS REQUIRED    Situation: Received a call from the patient stating she feels like she potentially has a blockage.     Background: Patient states every year she has to go to [place] and have colon polyps removed. Patient states she did not have any polyps this year but about one week ago the patient states the experience constipation (patient states she has never had an issue with constipation in the past). Patient states she reached out to [place] and they recommended the patient call the clinic to obtain a X-ray to see if there is a blockage.     Assessment: Upon conversing with the patient, the patient states her last normal movement was 12/13/22. Patient states she has passed a little but of stool since then but she continues to feel bloated and is starting to feel a little nauseous. Patient states she took Dulcolax on Tuesday and another dose this morning and it has not helped. Patient states when she does have a bowel movement is comes out watery/soft. Patient states she does have to strain but she does not want to push hard since she does have some tiny hemorrhoids. Patient states she feels the urge to go but then nothing comes out. Patient states she has been traveling a lot recently: [place] and [place] for a month and then [place] last week.     Protocol Recommended Disposition:   See in Office Today    Recommendation: Triage protocol recommending patient be seen in the office today. No available appointments. Patient is wondering if she can just obtain an order for a X-ray? Will route to PCP for review.     Routed to provider    Does the patient meet one of the following criteria for ADS visit consideration? 14+ years old, with an [facility] PCP     TIP  Providers, please consider if this condition is appropriate for management at one of our Acute and Diagnostic Services sites.     If patient is a good candidate, please use dotphrase  "<dot>triageresponse and select Refer to ADS to document.    1. STOOL PATTERN OR FREQUENCY: \"How often do you have a bowel movement (BM)?\"  (Normal range: 3 times a day to every 3 days)  \"When was your last BM?\"        Has not had a regular bowel movement since 12/13/22  2. STRAINING: \"Do you have to strain to have a BM?\"       Yes: patient does not strain too much because she does have some small hemorrhoids  3. RECTAL PAIN: \"Does your rectum hurt when the stool comes out?\" If Yes, ask: \"Do you have hemorrhoids? How bad is the pain?\"  (Scale 1-10; or mild, moderate, severe)      No pain  4. STOOL COMPOSITION: \"Are the stools hard?\"       Watery/soft  5. BLOOD ON STOOLS: \"Has there been any blood on the toilet tissue or on the surface of the BM?\" If Yes, ask: \"When was the last time?\"       No blood present in the stool  6. CHRONIC CONSTIPATION: \"Is this a new problem for you?\"  If no, ask: \"How long have you had this problem?\" (days, weeks, months)       Yes  7. CHANGES IN DIET OR HYDRATION: \"Have there been any recent changes in your diet?\" \"How much fluids are you drinking on a daily basis?\"  \"How much have you had to drink today?\"      No  8. MEDICATIONS: \"Have you been taking any new medications?\" \"Are you taking any narcotic pain medications?\" (e.g., Vicodin, Percocet, morphine, Dilaudid)      No  9. LAXATIVES: \"Have you been using any stool softeners, laxatives, or enemas?\"  If yes, ask \"What, how often, and when was the last time?\"      Dulcolax  10. ACTIVITY:  \"How much walking do you do every day?\"  \"Has your activity level decreased in the past week?\"         Went from high level exercise to less activity ([place] from [place])  11. CAUSE: \"What do you think is causing the constipation?\"         Blockage?  12. OTHER SYMPTOMS: \"Do you have any other symptoms?\" (e.g., abdominal pain, bloating, fever, vomiting)        Bloated, nausea, no appetite, no abdominal pain  13. MEDICAL HISTORY: \"Do you have a " "history of hemorrhoids, rectal fissures, or rectal surgery or rectal abscess?\"          Yes: patient normally has to go to [place] once a year to get colon polyps removed  14. PREGNANCY: \"Is there any chance you are pregnant?\" \"When was your last menstrual period?\"        No    Can we leave a detailed message on this number? YES  Phone number patient can be reached at: Cell number on file:    Telephone Information:   Mobile [phone]       Reason for Disposition    Last bowel movement (BM) > 4 days ago    Additional Information    Negative: Abdomen pain is main symptom and male    Negative: Abdomen pain is main symptom and female    Negative: Rectal bleeding or blood in stool is main symptom    Negative: Vomiting bile (green color)    Negative: Patient sounds very sick or weak to the triager    Negative: Constant abdominal pain lasting > 2 hours    Negative: Vomiting and abdomen looks much more swollen than usual    Negative: Rectal pain or fullness from fecal impaction (rectum full of stool) and NOT better after SITZ bath, suppository or enema    Negative: Abdomen is more swollen than usual    Protocols used: CONSTIPATION-A-[place]    Bev Finney RN    "

## 2022-12-23 NOTE — TELEPHONE ENCOUNTER
Call to patient. Patient informed of KIM Sebastian's below response. Patient verbalized understanding and declines appointment. Patient states she is going to try an enema herself and see if that helps.       Routing to provider as JOSEF.       Annemarie Greco, RN BSN MSN  [facility]

## 2022-12-23 NOTE — TELEPHONE ENCOUNTER
Unfortunately, there isn't much we can do in the clinic for this.  She may need an enema.  I could eval her at 10 this am if she can make it here.  Otherwise, I'd recommend UC or ED.  Thanks.

## 2022-12-26 ENCOUNTER — ANCILLARY PROCEDURE (OUTPATIENT)
Dept: GENERAL RADIOLOGY | Facility: CLINIC | Age: 67
End: 2022-12-26
Attending: PHYSICIAN ASSISTANT
Payer: MEDICARE

## 2022-12-26 ENCOUNTER — OFFICE VISIT (OUTPATIENT)
Dept: PEDIATRICS | Facility: CLINIC | Age: 67
End: 2022-12-26
Attending: PHYSICIAN ASSISTANT
Payer: MEDICARE

## 2022-12-26 ENCOUNTER — HOSPITAL ENCOUNTER (OUTPATIENT)
Dept: CT IMAGING | Facility: CLINIC | Age: 67
Discharge: HOME | End: 2022-12-26
Attending: PHYSICIAN ASSISTANT | Admitting: PHYSICIAN ASSISTANT
Payer: MEDICARE

## 2022-12-26 ENCOUNTER — OFFICE VISIT (OUTPATIENT)
Dept: URGENT CARE | Facility: URGENT CARE | Age: 67
End: 2022-12-26
Payer: MEDICARE

## 2022-12-26 VITALS
TEMPERATURE: 97.2 F | SYSTOLIC BLOOD PRESSURE: 149 MMHG | HEART RATE: 61 BPM | DIASTOLIC BLOOD PRESSURE: 90 MMHG | OXYGEN SATURATION: 97 %

## 2022-12-26 VITALS
OXYGEN SATURATION: 97 % | BODY MASS INDEX: 27.1 KG/M2 | HEART RATE: 62 BPM | DIASTOLIC BLOOD PRESSURE: 89 MMHG | SYSTOLIC BLOOD PRESSURE: 162 MMHG | WEIGHT: 153 LBS | TEMPERATURE: 97.6 F | RESPIRATION RATE: 18 BRPM

## 2022-12-26 DIAGNOSIS — R11.0 NAUSEA: ICD-10-CM

## 2022-12-26 DIAGNOSIS — R63.0 ANOREXIA: ICD-10-CM

## 2022-12-26 DIAGNOSIS — Z86.0100 HISTORY OF COLONIC POLYPS: Primary | ICD-10-CM

## 2022-12-26 DIAGNOSIS — R14.0 BLOATING: ICD-10-CM

## 2022-12-26 DIAGNOSIS — R19.5 NONSPECIFIC ABNORMAL FINDING IN STOOL CONTENTS: ICD-10-CM

## 2022-12-26 DIAGNOSIS — K59.00 CONSTIPATION, UNSPECIFIED CONSTIPATION TYPE: ICD-10-CM

## 2022-12-26 DIAGNOSIS — R03.0 ELEVATED BP WITHOUT DIAGNOSIS OF HYPERTENSION: ICD-10-CM

## 2022-12-26 DIAGNOSIS — K50.10 SEGMENTAL COLITIS WITHOUT COMPLICATION (H): Primary | ICD-10-CM

## 2022-12-26 DIAGNOSIS — R14.0 ABDOMINAL DISTENTION: ICD-10-CM

## 2022-12-26 LAB
ALBUMIN SERPL BCG-MCNC: 4.6 G/DL (ref 3.5–5.2)
ALBUMIN UR-MCNC: 10 MG/DL
ALP SERPL-CCNC: 69 U/L (ref 35–104)
ALT SERPL W P-5'-P-CCNC: 9 U/L (ref 10–35)
ANION GAP SERPL CALCULATED.3IONS-SCNC: 12 MMOL/L (ref 7–15)
APPEARANCE UR: CLEAR
AST SERPL W P-5'-P-CCNC: 23 U/L (ref 10–35)
BASOPHILS # BLD AUTO: 0.1 10E3/UL (ref 0–0.2)
BASOPHILS NFR BLD AUTO: 1 %
BILIRUB SERPL-MCNC: 0.5 MG/DL
BILIRUB UR QL STRIP: NEGATIVE
BUN SERPL-MCNC: 9.9 MG/DL (ref 8–23)
CALCIUM SERPL-MCNC: 9.9 MG/DL (ref 8.8–10.2)
CHLORIDE SERPL-SCNC: 101 MMOL/L (ref 98–107)
COLOR UR AUTO: YELLOW
CREAT SERPL-MCNC: 0.67 MG/DL (ref 0.51–0.95)
CRP SERPL-MCNC: <3 MG/L
DEPRECATED HCO3 PLAS-SCNC: 27 MMOL/L (ref 22–29)
EOSINOPHIL # BLD AUTO: 0.3 10E3/UL (ref 0–0.7)
EOSINOPHIL NFR BLD AUTO: 5 %
ERYTHROCYTE [DISTWIDTH] IN BLOOD BY AUTOMATED COUNT: 13.3 % (ref 10–15)
ERYTHROCYTE [SEDIMENTATION RATE] IN BLOOD BY WESTERGREN METHOD: 9 MM/HR (ref 0–30)
GFR SERPL CREATININE-BSD FRML MDRD: >90 ML/MIN/1.73M2
GLUCOSE SERPL-MCNC: 95 MG/DL (ref 70–99)
GLUCOSE UR STRIP-MCNC: NEGATIVE MG/DL
HCT VFR BLD AUTO: 43.6 % (ref 35–47)
HGB BLD-MCNC: 13.7 G/DL (ref 11.7–15.7)
HGB UR QL STRIP: NEGATIVE
HYALINE CASTS: 1 /LPF
IMM GRANULOCYTES # BLD: 0 10E3/UL
IMM GRANULOCYTES NFR BLD: 0 %
KETONES UR STRIP-MCNC: 10 MG/DL
LEUKOCYTE ESTERASE UR QL STRIP: ABNORMAL
LYMPHOCYTES # BLD AUTO: 2.3 10E3/UL (ref 0.8–5.3)
LYMPHOCYTES NFR BLD AUTO: 38 %
MCH RBC QN AUTO: 28.7 PG (ref 26.5–33)
MCHC RBC AUTO-ENTMCNC: 31.4 G/DL (ref 31.5–36.5)
MCV RBC AUTO: 91 FL (ref 78–100)
MONOCYTES # BLD AUTO: 0.8 10E3/UL (ref 0–1.3)
MONOCYTES NFR BLD AUTO: 13 %
MUCOUS THREADS #/AREA URNS LPF: PRESENT /LPF
NEUTROPHILS # BLD AUTO: 2.6 10E3/UL (ref 1.6–8.3)
NEUTROPHILS NFR BLD AUTO: 43 %
NITRATE UR QL: NEGATIVE
NRBC # BLD AUTO: 0 10E3/UL
NRBC BLD AUTO-RTO: 0 /100
PH UR STRIP: 6 [PH] (ref 5–7)
PLATELET # BLD AUTO: 303 10E3/UL (ref 150–450)
POTASSIUM SERPL-SCNC: 3.8 MMOL/L (ref 3.4–5.3)
PROT SERPL-MCNC: 7.1 G/DL (ref 6.4–8.3)
RBC # BLD AUTO: 4.77 10E6/UL (ref 3.8–5.2)
RBC URINE: 1 /HPF
SODIUM SERPL-SCNC: 140 MMOL/L (ref 136–145)
SP GR UR STRIP: 1.02 (ref 1–1.03)
TSH SERPL DL<=0.005 MIU/L-ACNC: 0.72 UIU/ML (ref 0.3–4.2)
UROBILINOGEN UR STRIP-MCNC: NORMAL MG/DL
WBC # BLD AUTO: 6.2 10E3/UL (ref 4–11)
WBC URINE: 10 /HPF

## 2022-12-26 PROCEDURE — 99207 REFERRAL TO ACUTE AND DIAGNOSTIC SERVICES: CPT | Performed by: PHYSICIAN ASSISTANT

## 2022-12-26 PROCEDURE — 86140 C-REACTIVE PROTEIN: CPT | Performed by: PHYSICIAN ASSISTANT

## 2022-12-26 PROCEDURE — 36415 COLL VENOUS BLD VENIPUNCTURE: CPT | Performed by: PHYSICIAN ASSISTANT

## 2022-12-26 PROCEDURE — 81001 URINALYSIS AUTO W/SCOPE: CPT | Performed by: PHYSICIAN ASSISTANT

## 2022-12-26 PROCEDURE — 80050 GENERAL HEALTH PANEL: CPT | Performed by: PHYSICIAN ASSISTANT

## 2022-12-26 PROCEDURE — 250N000009 HC RX 250: Performed by: PHYSICIAN ASSISTANT

## 2022-12-26 PROCEDURE — 250N000011 HC RX IP 250 OP 636: Performed by: PHYSICIAN ASSISTANT

## 2022-12-26 PROCEDURE — 74177 CT ABD & PELVIS W/CONTRAST: CPT | Mod: MG

## 2022-12-26 PROCEDURE — 99215 OFFICE O/P EST HI 40 MIN: CPT | Performed by: PHYSICIAN ASSISTANT

## 2022-12-26 PROCEDURE — 74019 RADEX ABDOMEN 2 VIEWS: CPT | Mod: TC | Performed by: RADIOLOGY

## 2022-12-26 PROCEDURE — G1010 CDSM STANSON: HCPCS

## 2022-12-26 PROCEDURE — 87086 URINE CULTURE/COLONY COUNT: CPT | Performed by: PHYSICIAN ASSISTANT

## 2022-12-26 PROCEDURE — 85652 RBC SED RATE AUTOMATED: CPT | Performed by: PHYSICIAN ASSISTANT

## 2022-12-26 RX ORDER — CIPROFLOXACIN 500 MG/1
500 TABLET, FILM COATED ORAL 2 TIMES DAILY
Qty: 28 TABLET | Refills: 0 | Status: SHIPPED | OUTPATIENT
Start: 2022-12-26 | End: 2023-01-09

## 2022-12-26 RX ORDER — IOPAMIDOL 755 MG/ML
120 INJECTION, SOLUTION INTRAVASCULAR ONCE
Status: COMPLETED | OUTPATIENT
Start: 2022-12-26 | End: 2022-12-26

## 2022-12-26 RX ORDER — METRONIDAZOLE 500 MG/1
250 TABLET ORAL 3 TIMES DAILY
Qty: 21 TABLET | Refills: 0 | Status: SHIPPED | OUTPATIENT
Start: 2022-12-26 | End: 2023-01-09

## 2022-12-26 RX ADMIN — IOPAMIDOL 76 ML: 755 INJECTION, SOLUTION INTRAVENOUS at 18:14

## 2022-12-26 RX ADMIN — SODIUM CHLORIDE 59 ML: 9 INJECTION, SOLUTION INTRAVENOUS at 18:14

## 2022-12-26 NOTE — PROGRESS NOTES
"  Assessment & Plan     Segmental colitis without complication (H)  Bloating  Constipation, unspecified constipation type  Anorexia  Nausea  Stat labs reassuring.  Stat CT shows diffuse colitis of the descending and sigmoid colon.  Will cover for bacterial etiology with close follow-up with GI if not improving.  Advised of warning signs and when to seek urgent care.  Patient voiced understanding agreement.  [place]/liquid diet encouraged until symptoms significantly improve.  - XR Abdomen 2 Views  - CBC with platelets differential  - CRP inflammation  - Erythrocyte sedimentation rate auto  - TSH with free T4 reflex  - Comprehensive metabolic panel  - sodium chloride (PF) 0.9% PF flush 5 mL  - UA with Microscopic reflex to Culture  - Urine Culture  - ciprofloxacin (CIPRO) 500 MG tablet  Dispense: 28 tablet; Refill: 0  - metroNIDAZOLE (FLAGYL) 500 MG tablet  Dispense: 21 tablet; Refill: 0  - Adult GI  Referral - Consult Only    Elevated BP without diagnosis of hypertension  Unclear etiology.  Likely due to pain.  Close follow-up with PCP      Review of prior external note(s) from - [facility] information from [place] reviewed  49 minutes spent on the date of the encounter doing chart review, history and exam, documentation and further activities per the note     BMI:   Estimated body mass index is 27.1 kg/m  as calculated from the following:    Height as of 7/18/22: 1.6 m (5' 3\").    Weight as of this encounter: 69.4 kg (153 lb).   Weight management plan: Patient was referred to their PCP to discuss a diet and exercise plan.    Return in about 1 week (around 1/2/2023) for consultation with Dr. Barrett if not improving (schedule now, cancel if not needed).    Ximena Vasquez PA-C  [facility] is a 67 year old, presenting for the following health issues:  Abdominal Pain (Lower abdomen issues X 2 weeks)      HPI     Abdominal/Flank Pain  Onset/Duration: X 2 " "weeks  Description:   Character: Denies pain, more of a \"Pressure\"  Location: right lower quadrant left lower quadrant yaquelin-umbilical region -denies overt pain but describes as \"discomfort \"  Radiation: None  Intensity: moderate  Progression of Symptoms:  worsening  Accompanying Signs & Symptoms:  Fever/Chills: YES- chills X 3-4 days  Gas/Bloating: YES  Nausea: YES-minor  Vomiting: no   Diarrhea: no   Constipation: YES- last \"normal\" BM 12/13/22, dulcolax 12/19, was able to pass stool on 12/23/22 with assistance from enema , again on 12/24 -not as significant BMs.  Stools that she are having are much less robust than her typical stool pattern and describes it as \"skinny like a pencil \".  Dysuria or Hematuria: no   History:   Trauma: no   Previous similar pain: no   Previous tests done: no   Previous Abdominal Surgery: no   Precipitating factors:   Does the pain change with:     Food: YES- increased pain once food gets to intestines, notices this 30-60 minutes after eating     Bowel Movement: no,but notes relief after first use of enema     Urination: no    Other factors:  YES- suspects she has increased fluids, increased urinary frequency due to this, denies Dysuria.  Notes feeling \"Pressure and full\"  Therapies tried and outcome: Enema on 12/23/22 with little stool released, another enema on 12/24/22 with no stool produced.  Also notes reduced food intake.  Dulcolax, 3 tablets on 12/19/22 and 12/21/22-didn't notice a change in sx's after Dulcolax     When did you eat last: Can of V8 @ 12:00 today.    [place] x 4 weeks - 11/25 home , [place] Dec 14 - 1 (symptoms began the first or second day she was in [place]).    Is normally extremely regular with her bowel movements.  This is very atypical for her.  Feels very bloated.  Has known uterine prolapse.    Historically has had unique polyps that require a fluid bolus to be administered to raise the polyp for adequate removal.  This technique is not done by every " gastroenterology team and she is scheduled to have colonoscopy early next year with [place].    Review of Systems   Constitutional, HEENT, cardiovascular, pulmonary, GI, , musculoskeletal, neuro, skin, endocrine and psych systems are negative, except as otherwise noted.      Objective    BP (!) 162/89 (BP Location: Right arm, Patient Position: Chair, Cuff Size: Adult Regular)   Pulse 62   Temp 97.6  F (36.4  C) (Oral)   Resp 18   Wt 69.4 kg (153 lb)   SpO2 97%   BMI 27.10 kg/m    Body mass index is 27.1 kg/m .  Physical Exam   GENERAL: healthy, alert and no distress  EYES: Eyes grossly normal to inspection  RESP: lungs clear to auscultation - no rales, rhonchi or wheezes  CV: regular rate and rhythm, normal S1 S2, no S3 or S4, no murmur, click or rub, no peripheral edema and peripheral pulses strong  ABDOMEN: soft, tenderness diffusely to left upper quadrant right upper quadrant and epigastrium, no hepatosplenomegaly, no masses and bowel sounds normal  MS: no gross musculoskeletal defects noted, no edema  SKIN: no suspicious lesions or rashes  NEURO: Normal strength and tone, mentation intact and speech normal  PSYCH: mentation appears normal, affect normal/bright  Results for orders placed or performed during the hospital encounter of 12/26/22   CT Abdomen Pelvis w Contrast     Status: None    Narrative    EXAM: CT ABDOMEN PELVIS W CONTRAST  LOCATION: [facility]  DATE/TIME: 12/26/2022 6:20 PM    INDICATION: lower abdominal pain x 2 weeks, bloating, decreased stool caliber, nausea, anorexia  COMPARISON: None.  TECHNIQUE: CT scan of the abdomen and pelvis was performed following injection of IV contrast. Multiplanar reformats were obtained. Dose reduction techniques were used.  CONTRAST:  76mL Isovue 370    FINDINGS:   LOWER CHEST: Normal.    HEPATOBILIARY: Hepatomegaly measuring 20 cm in length.    PANCREAS: Normal.    SPLEEN: Normal.    ADRENAL GLANDS: Normal.    KIDNEYS/BLADDER:  Normal.    BOWEL: There is diffuse wall thickening involving the descending and sigmoid colon and rectum, which are nondistended and suboptimally assessed. The transverse and right colon are unremarkable. Scattered diverticula sigmoid colon. No evidence for   obstruction.     LYMPH NODES: Normal.    VASCULATURE: Unremarkable.    PELVIC ORGANS: Normal.    MUSCULOSKELETAL: Total right hip arthroplasty. Presumed old L1 vertebral body compression fracture. Small fat-containing umbilical hernia.      Impression    IMPRESSION:   1.  Wall thickening involving the descending and sigmoid colon and rectum concerning for an acute infectious or inflammatory colitis. No evidence for obstruction.   Results for orders placed or performed in visit on 12/26/22   XR Abdomen 2 Views     Status: None    Narrative    EXAM: XR ABDOMEN 2 VIEWS  LOCATION: [facility]  DATE/TIME: 12/26/2022 4:39 PM    INDICATION: Bloating, nausea and constipation x2 weeks.  COMPARISON: 1 view pelvis 10/21/2018      Impression    IMPRESSION: Negative abdomen. Bowel gas pattern is normal. Nothing for obstruction or free air. No evidence for renal stones. Mild stool burden. Left hip arthroplasty redemonstrated.   Results for orders placed or performed in visit on 12/26/22   CRP inflammation     Status: Normal   Result Value Ref Range    CRP Inflammation <3.00 <5.00 mg/L   Erythrocyte sedimentation rate auto     Status: Normal   Result Value Ref Range    Erythrocyte Sedimentation Rate 9 0 - 30 mm/hr   TSH with free T4 reflex     Status: Normal   Result Value Ref Range    TSH 0.72 0.30 - 4.20 uIU/mL   Comprehensive metabolic panel     Status: Abnormal   Result Value Ref Range    Sodium 140 136 - 145 mmol/L    Potassium 3.8 3.4 - 5.3 mmol/L    Chloride 101 98 - 107 mmol/L    Carbon Dioxide (CO2) 27 22 - 29 mmol/L    Anion Gap 12 7 - 15 mmol/L    Urea Nitrogen 9.9 8.0 - 23.0 mg/dL    Creatinine 0.67 0.51 - 0.95 mg/dL    Calcium 9.9 8.8 -  10.2 mg/dL    Glucose 95 70 - 99 mg/dL    Alkaline Phosphatase 69 35 - 104 U/L    AST 23 10 - 35 U/L    ALT 9 (L) 10 - 35 U/L    Protein Total 7.1 6.4 - 8.3 g/dL    Albumin 4.6 3.5 - 5.2 g/dL    Bilirubin Total 0.5 <=1.2 mg/dL    GFR Estimate >90 >60 mL/min/1.73m2   CBC with platelets and differential     Status: Abnormal   Result Value Ref Range    WBC Count 6.2 4.0 - 11.0 10e3/uL    RBC Count 4.77 3.80 - 5.20 10e6/uL    Hemoglobin 13.7 11.7 - 15.7 g/dL    Hematocrit 43.6 35.0 - 47.0 %    MCV 91 78 - 100 fL    MCH 28.7 26.5 - 33.0 pg    MCHC 31.4 (L) 31.5 - 36.5 g/dL    RDW 13.3 10.0 - 15.0 %    Platelet Count 303 150 - 450 10e3/uL    % Neutrophils 43 %    % Lymphocytes 38 %    % Monocytes 13 %    % Eosinophils 5 %    % Basophils 1 %    % Immature Granulocytes 0 %    NRBCs per 100 WBC 0 <1 /100    Absolute Neutrophils 2.6 1.6 - 8.3 10e3/uL    Absolute Lymphocytes 2.3 0.8 - 5.3 10e3/uL    Absolute Monocytes 0.8 0.0 - 1.3 10e3/uL    Absolute Eosinophils 0.3 0.0 - 0.7 10e3/uL    Absolute Basophils 0.1 0.0 - 0.2 10e3/uL    Absolute Immature Granulocytes 0.0 <=0.4 10e3/uL    Absolute NRBCs 0.0 10e3/uL   UA with Microscopic reflex to Culture     Status: Abnormal    Specimen: Urine, Clean Catch   Result Value Ref Range    Color Urine Yellow Colorless, Straw, Light Yellow, Yellow    Appearance Urine Clear Clear    Glucose Urine Negative Negative mg/dL    Bilirubin Urine Negative Negative    Ketones Urine 10 (A) Negative mg/dL    Specific Gravity Urine 1.025 1.003 - 1.035    Blood Urine Negative Negative    pH Urine 6.0 5.0 - 7.0    Protein Albumin Urine 10 (A) Negative mg/dL    Urobilinogen Urine Normal Normal, 2.0 mg/dL    Nitrite Urine Negative Negative    Leukocyte Esterase Urine Moderate (A) Negative    Mucus Urine Present (A) None Seen /LPF    RBC Urine 1 <=2 /HPF    WBC Urine 10 (H) <=5 /HPF    Hyaline Casts Urine 1 <=2 /LPF    Narrative    Urine Culture ordered based on laboratory criteria   Urine Culture      Status: None    Specimen: Urine, Clean Catch   Result Value Ref Range    Culture <10,000 CFU/mL Urogenital zeus    CBC with platelets differential     Status: Abnormal    Narrative    The following orders were created for panel order CBC with platelets differential.  Procedure                               Abnormality         Status                     ---------                               -----------         ------                     CBC with platelets and d...[[id number]]  Abnormal            Final result                 Please view results for these tests on the individual orders.

## 2022-12-26 NOTE — TELEPHONE ENCOUNTER
"Patient states that she did not hear the whole message below.     Patient calling back to inform that she tried an enema a couple of times but didn't get much out.  States that the only result she got was soft, mushy stool. \"Like mash potatoes with too much water.\" Stools is not bloody or black or tarry.     States that her abdomen is slightly distended. States she looks 3 months pregnant.    No abdominal pain unless she eats something.    No appointments in clinic today. Please advise if UC or appointment with Dr. Doran later this week .  Patient states that if she needs to wait until Wednesday or Thursday, she won't eat. States that she gets uncomfortable if she has to eat.     Can we leave a detailed message on this number? YES  Phone number patient can be reached at: Cell number on file:    Telephone Information:   Mobile [phone]       Mary Donnelly RN  [facility]        "

## 2022-12-26 NOTE — PROGRESS NOTES
"  Assessment & Plan     History of colonic polyps    Patient sees [facility] ER for sessile polyps and has had them removed with a new procedure    Abdominal distention    For 2 wks she has had abdominal distention, bloating    Nonspecific abnormal finding in stool contents    Small caliper stools  Talked with PCP and has taken laxatives and enemas, no change    Anorexia    Does not feel like eating as this upsets and causes bloating      Referral to ADS today     BMI:   Estimated body mass index is 29.41 kg/m  as calculated from the following:    Height as of 7/18/22: 1.6 m (5' 3\").    Weight as of 7/18/22: 75.3 kg (166 lb).       CONSULTATION/REFERRAL to ADS    No follow-ups on file.    Marvin Meneses, [facility], PA-C  [facility]son[facility]ANNY Parekh is a 67 year old, presenting for the following health issues:  BOWEL ISSUES (STARTED 12/14/22, HAS BEEN USING DUCOLAX, TRIED MULTIPLE ENEMAS, IS FEELING FULL/BLOATED AND ISNT ABLE TO PASS STOOLS, ISNT EATING MUCH AND APPETITE IS LOST, DOESN'T HAVE THE URGE TO USE THE BATHROOM BUT FEELS LIKE SHE HAS SOMETHING IN HER BOWELS)      HPI   Review of Systems   Constitutional, HEENT, cardiovascular, pulmonary, GI, , musculoskeletal, neuro, skin, endocrine and psych systems are negative, except as otherwise noted.      Objective    BP (!) 149/90   Pulse 61   Temp 97.2  F (36.2  C) (Tympanic)   SpO2 97%   There is no height or weight on file to calculate BMI.  Physical Exam   GENERAL: healthy, alert and no distress  RESP: lungs clear to auscultation - no rales, rhonchi or wheezes  CV: regular rate and rhythm, normal S1 S2, no S3 or S4, no murmur, click or rub, no peripheral edema and peripheral pulses strong  ABDOMEN: soft, nontender, without hepatosplenomegaly or masses, tenderness suprapubic and RLQ and no organomegaly or masses  MS: no gross musculoskeletal defects noted, no edema  SKIN: no suspicious lesions or rashes  NEURO: Normal strength and " tone, mentation intact and speech normal  PSYCH: mentation appears normal, affect normal/bright

## 2022-12-26 NOTE — RESULT ENCOUNTER NOTE
Results discussed directly with patient while patient was present. Any further details documented in the note.   Ximena Vasquez PA-C

## 2022-12-27 ENCOUNTER — NURSE TRIAGE (OUTPATIENT)
Dept: FAMILY MEDICINE | Facility: CLINIC | Age: 67
End: 2022-12-27

## 2022-12-27 NOTE — TELEPHONE ENCOUNTER
Nurse Triage SBAR    Is this a 2nd Level Triage? YES, LICENSED PRACTITIONER REVIEW IS REQUIRED    Situation: Pt called stating today her symptoms feel worse. She is experiencing intermittent lower abdominal pain (sharp shooting pain), headache, fatigue (has been in bed all day), elevated /89 pulse 74, bloating, has not eaten anything since Friday, no stool since 12/23/22 (last normal BM was 12/13/22), and dry mouth. Yesterday she went to  and [facility]. She was prescribed Cipro and Flagyl which she started today and is scheduled with Dr. Barrett on Thursday. She has been drinking fluids as able and still urinating. Xray and CT done yesterday as well. She already reached out to [place] last week who instructed her to call [facility]. See triage from 12/22/22 when pt asked for xray. The pain is not occurring at the time of the phone call.    Background: Triaged symptoms since pt states they are worsening and protocol advises to be seen today with no availability. Pt states she does not want to go back to UC/ER because she will have to explain everything all over again, she states she has already spoken to multiple nurses/provider and worried about continuity of care. Nurse attempted to explain with new or worsening symptoms pt may need re-evaluation or different recommendations if things are worsening.     Assessment: see below    Protocol Recommended Disposition:   See in Office Within 3 Days, See in Office Today   Pt was concerned that triage uses protocols to assess and given disposition. She states the  provider knew right away something was wrong and escalated it. She is disheartened by list of questions and having to answer all of these again.     Recommendation: No availability in the clinic today, pt did not want to go to UC/ER at the time of the call. Please review/advise.      Routed to provider    Does the patient meet one of the following criteria for ADS visit consideration? 16+ years  old, with an [facility] PCP     TIP  Providers, please consider if this condition is appropriate for management at one of our Acute and Diagnostic Services sites.     If patient is a good candidate, please use dotphrase <dot>triageresponse and select Refer to ADS to document.    Ok to leave detailed vm.     Reason for Disposition    MODERATE pain (e.g., interferes with normal activities that comes and goes (cramps) lasts > 24 hours  (Exception: Pain with Vomiting or Diarrhea - see that Protocol.)    Systolic BP >= 160 OR Diastolic >= 100    Additional Information    Negative: Passed out (i.e., fainted, collapsed and was not responding)    Negative: Shock suspected (e.g., cold/pale/clammy skin, too weak to stand, low BP, rapid pulse)    Negative: Sounds like a life-threatening emergency to the triager    Negative: Chest pain    Negative: Pain is mainly in upper abdomen (if needed ask: 'is it mainly above the belly button?')    Negative: Abdominal pain and pregnant < 20 weeks    Negative: Abdominal pain and pregnant 20 or more weeks    Negative: SEVERE abdominal pain (e.g., excruciating)    Negative: Vomiting red blood or black (coffee ground) material    Negative: Bloody, black, or tarry bowel movements  (Exception: Chronic-unchanged black-grey bowel movements and is taking iron pills or Pepto-Bismol.)    Negative: Constant abdominal pain lasting > 2 hours    Negative: Vomiting bile (green color)    Negative: Patient sounds very sick or weak to the triager    Negative: Vomiting and abdomen looks much more swollen than usual    Negative: White of the eyes have turned yellow (i.e., jaundice)    Negative: Blood in urine (red, pink, or tea-colored)    Negative: Fever > 103 F (39.4 C)    Negative: Fever > 101 F (38.3 C) and over 60 years of age    Negative: Fever > 100.0 F (37.8 C) and has diabetes mellitus or a weak immune system (e.g., HIV positive, cancer chemotherapy, organ transplant, splenectomy, chronic steroids)     "Negative: Fever > 100.0 F (37.8 C) and bedridden (e.g., nursing home patient, stroke, chronic illness, recovering from surgery)    Negative: Pregnant or could be pregnant (i.e., missed last menstrual period)    Negative: Sounds like a life-threatening emergency to the triager    Negative: Symptom is main concern (e.g., headache, chest pain)    Negative: Low blood pressure is main concern    Negative: Systolic BP >= 160 OR Diastolic >= 100, and any cardiac or neurologic symptoms (e.g., chest pain, difficulty breathing, unsteady gait, blurred vision)    Negative: Pregnant 20 or more weeks (or postpartum < 6 weeks) with new hand or face swelling    Negative: Pregnant 20 or more weeks (or postpartum < 6 weeks) and Systolic BP >= 160 OR Diastolic >= 100    Negative: Patient sounds very sick or weak to the triager    Negative: Systolic BP >= 200 OR Diastolic >= 120 and having NO cardiac or neurologic symptoms    Negative: Pregnant 20 or more weeks (or postpartum < 6 weeks) with Systolic BP >= 140 OR Diastolic >= 90    Negative: Systolic BP >= 180 OR Diastolic >= 110, and missed most recent dose of blood pressure medication    Negative: Systolic BP >= 180 OR Diastolic >= 110    Negative: Patient wants to be seen    Negative: Ran out of BP medications    Negative: Taking BP medications and feels is having side effects (e.g., impotence, cough, dizziness)    Answer Assessment - Initial Assessment Questions  1. LOCATION: \"Where does it hurt?\"         Pain is sharp shooting pain in lower abdomen. Not right now. Feels very boated, weak, and headache, with elevated BP.    2. RADIATION: \"Does the pain shoot anywhere else?\" (e.g., chest, back)        No    3. ONSET: \"When did the pain begin?\" (e.g., minutes, hours or days ago)         It occurred 30 minutes ago    4. SUDDEN: \"Gradual or sudden onset?\"        Happened all of a sudden    5. PATTERN \"Does the pain come and go, or is it constant?\"     - If constant: \"Is it getting " "better, staying the same, or worsening?\"       (Note: Constant means the pain never goes away completely; most serious pain is constant and it progresses)      - If intermittent: \"How long does it last?\" \"Do you have pain now?\"      (Note: Intermittent means the pain goes away completely between bouts)        Comes and goes. Feeling more and more bloated. She doesn't have the pain now. The pain lasted for seconds.     6. SEVERITY: \"How bad is the pain?\"  (e.g., Scale 1-10; mild, moderate, or severe)    - MILD (1-3): doesn't interfere with normal activities, abdomen soft and not tender to touch     - MODERATE (4-7): interferes with normal activities or awakens from sleep, abdomen tender to touch     - SEVERE (8-10): excruciating pain, doubled over, unable to do any normal activities         Moderate sharp shooting pain    7. RECURRENT SYMPTOM: \"Have you ever had this type of stomach pain before?\" If Yes, ask: \"When was the last time?\" and \"What happened that time?\"         No    8. CAUSE: \"What do you think is causing the stomach pain?\"        Concern about blockage    9. RELIEVING/AGGRAVATING FACTORS: \"What makes it better or worse?\" (e.g., movement, antacids, bowel movement)        Nothing makes it better or worse. She states it continues to get worse overtime.    10. OTHER SYMPTOMS: \"Do you have any other symptoms?\" (e.g., back pain, diarrhea, fever, urination pain, vomiting)          Headache which is new as of today, elevated /89 pulse 74, faitgue, poor appetite, bloated    11. PREGNANCY: \"Is there any chance you are pregnant?\" \"When was your last menstrual period?\"          NA    Protocols used: ABDOMINAL PAIN - FEMALE-A-OH, BLOOD PRESSURE - HIGH-A-OH    SEE IN OFFICE TODAY:   * You need to be examined today. Let me give you an appointment.  * IF NO AVAILABLE APPOINTMENTS: You need to be seen in the Urgent Care Center. Go to the one at ____________. Go there today. A nearby Urgent Care Center is often a " good source of care. Another choice is to go to the Emergency Department.      CALL BACK IF:  * Headache, blurred vision, difficulty talking, or difficulty walking occurs  * Chest pain or difficulty breathing occurs  * You want to go into the office for a blood pressure check  * You become worse        Patient/Caregiver understands and will follow care advice? Other, see documentation        Agustina ROTH RN  [facility]

## 2022-12-27 NOTE — TELEPHONE ENCOUNTER
Called and spoke with patient regarding MD message below. She is agreeable to plan. Advised patient to go to ER if s/s worsen.    Coty Beard RN on 12/27/2022 at 4:26 PM

## 2022-12-28 ENCOUNTER — HOSPITAL ENCOUNTER (EMERGENCY)
Facility: CLINIC | Age: 67
Discharge: LEFT BEFORE DISCHARGE | End: 2022-12-28
Admitting: EMERGENCY MEDICINE
Payer: MEDICARE

## 2022-12-28 VITALS
RESPIRATION RATE: 16 BRPM | SYSTOLIC BLOOD PRESSURE: 167 MMHG | OXYGEN SATURATION: 97 % | WEIGHT: 148 LBS | TEMPERATURE: 97.6 F | BODY MASS INDEX: 26.22 KG/M2 | HEART RATE: 84 BPM | HEIGHT: 63 IN | DIASTOLIC BLOOD PRESSURE: 91 MMHG

## 2022-12-28 LAB
ALBUMIN SERPL-MCNC: 4.1 G/DL (ref 3.4–5)
ALP SERPL-CCNC: 66 U/L (ref 40–150)
ALT SERPL W P-5'-P-CCNC: 14 U/L (ref 0–50)
ANION GAP SERPL CALCULATED.3IONS-SCNC: 5 MMOL/L (ref 3–14)
AST SERPL W P-5'-P-CCNC: 16 U/L (ref 0–45)
BACTERIA UR CULT: NORMAL
BASOPHILS # BLD AUTO: 0.1 10E3/UL (ref 0–0.2)
BASOPHILS NFR BLD AUTO: 1 %
BILIRUB SERPL-MCNC: 1 MG/DL (ref 0.2–1.3)
BUN SERPL-MCNC: 10 MG/DL (ref 7–30)
CALCIUM SERPL-MCNC: 9.7 MG/DL (ref 8.5–10.1)
CHLORIDE BLD-SCNC: 102 MMOL/L (ref 94–109)
CO2 SERPL-SCNC: 29 MMOL/L (ref 20–32)
CREAT SERPL-MCNC: 0.73 MG/DL (ref 0.52–1.04)
EOSINOPHIL # BLD AUTO: 0.3 10E3/UL (ref 0–0.7)
EOSINOPHIL NFR BLD AUTO: 4 %
ERYTHROCYTE [DISTWIDTH] IN BLOOD BY AUTOMATED COUNT: 12.7 % (ref 10–15)
GFR SERPL CREATININE-BSD FRML MDRD: 89 ML/MIN/1.73M2
GLUCOSE BLD-MCNC: 107 MG/DL (ref 70–99)
HCT VFR BLD AUTO: 45.4 % (ref 35–47)
HGB BLD-MCNC: 14.7 G/DL (ref 11.7–15.7)
HOLD SPECIMEN: NORMAL
HOLD SPECIMEN: NORMAL
IMM GRANULOCYTES # BLD: 0 10E3/UL
IMM GRANULOCYTES NFR BLD: 0 %
LIPASE SERPL-CCNC: 140 U/L (ref 73–393)
LYMPHOCYTES # BLD AUTO: 2.5 10E3/UL (ref 0.8–5.3)
LYMPHOCYTES NFR BLD AUTO: 37 %
MCH RBC QN AUTO: 28.8 PG (ref 26.5–33)
MCHC RBC AUTO-ENTMCNC: 32.4 G/DL (ref 31.5–36.5)
MCV RBC AUTO: 89 FL (ref 78–100)
MONOCYTES # BLD AUTO: 0.9 10E3/UL (ref 0–1.3)
MONOCYTES NFR BLD AUTO: 14 %
NEUTROPHILS # BLD AUTO: 3 10E3/UL (ref 1.6–8.3)
NEUTROPHILS NFR BLD AUTO: 44 %
NRBC # BLD AUTO: 0 10E3/UL
NRBC BLD AUTO-RTO: 0 /100
PLATELET # BLD AUTO: 316 10E3/UL (ref 150–450)
POTASSIUM BLD-SCNC: 4 MMOL/L (ref 3.4–5.3)
PROT SERPL-MCNC: 7.6 G/DL (ref 6.8–8.8)
RBC # BLD AUTO: 5.11 10E6/UL (ref 3.8–5.2)
SODIUM SERPL-SCNC: 136 MMOL/L (ref 133–144)
WBC # BLD AUTO: 6.7 10E3/UL (ref 4–11)

## 2022-12-28 PROCEDURE — 85004 AUTOMATED DIFF WBC COUNT: CPT | Performed by: EMERGENCY MEDICINE

## 2022-12-28 PROCEDURE — 999N000104 HC STATISTIC NO CHARGE

## 2022-12-28 PROCEDURE — 36415 COLL VENOUS BLD VENIPUNCTURE: CPT | Performed by: EMERGENCY MEDICINE

## 2022-12-28 PROCEDURE — 80053 COMPREHEN METABOLIC PANEL: CPT | Performed by: EMERGENCY MEDICINE

## 2022-12-28 PROCEDURE — 83690 ASSAY OF LIPASE: CPT | Performed by: EMERGENCY MEDICINE

## 2022-12-29 NOTE — ED TRIAGE NOTES
Pt has known colon infection and bowel obstruction. No bowel movement since 12/23. Nausea and vomiting, dizziness. [facility] told pt to return to ED for n/v and dizziness. Abdominal pain 4/10, intermittent

## 2023-01-02 PROBLEM — R03.0 ELEVATED BP WITHOUT DIAGNOSIS OF HYPERTENSION: Status: ACTIVE | Noted: 2023-01-02

## 2023-01-02 PROBLEM — E78.5 HYPERLIPIDEMIA LDL GOAL <130: Status: RESOLVED | Noted: 2017-09-15 | Resolved: 2023-01-02

## 2023-01-02 ASSESSMENT — ASTHMA QUESTIONNAIRES: ACT_TOTALSCORE: 25

## 2023-01-05 ENCOUNTER — NURSE TRIAGE (OUTPATIENT)
Dept: FAMILY MEDICINE | Facility: CLINIC | Age: 68
End: 2023-01-05

## 2023-01-05 ENCOUNTER — APPOINTMENT (OUTPATIENT)
Dept: CT IMAGING | Facility: CLINIC | Age: 68
DRG: 920 | End: 2023-01-05
Attending: PHYSICIAN ASSISTANT
Payer: MEDICARE

## 2023-01-05 ENCOUNTER — TRANSFERRED RECORDS (OUTPATIENT)
Dept: HEALTH INFORMATION MANAGEMENT | Facility: CLINIC | Age: 68
End: 2023-01-05

## 2023-01-05 ENCOUNTER — HOSPITAL ENCOUNTER (INPATIENT)
Facility: CLINIC | Age: 68
LOS: 3 days | Discharge: HOME | DRG: 920 | End: 2023-01-08
Attending: PHYSICIAN ASSISTANT | Admitting: INTERNAL MEDICINE
Payer: MEDICARE

## 2023-01-05 DIAGNOSIS — K91.840 COLONOSCOPY CAUSING POST-PROCEDURAL BLEEDING: ICD-10-CM

## 2023-01-05 DIAGNOSIS — R55 SYNCOPE: ICD-10-CM

## 2023-01-05 DIAGNOSIS — K92.1 HEMATOCHEZIA: ICD-10-CM

## 2023-01-05 DIAGNOSIS — D62 ANEMIA DUE TO BLOOD LOSS, ACUTE: ICD-10-CM

## 2023-01-05 LAB
ABO/RH(D): ABNORMAL
ALBUMIN SERPL-MCNC: 3.7 G/DL (ref 3.4–5)
ALP SERPL-CCNC: 53 U/L (ref 40–150)
ALT SERPL W P-5'-P-CCNC: 13 U/L (ref 0–50)
ANION GAP SERPL CALCULATED.3IONS-SCNC: 11 MMOL/L (ref 3–14)
ANTIBODY ID: NORMAL
ANTIBODY SCREEN: POSITIVE
AST SERPL W P-5'-P-CCNC: 22 U/L (ref 0–45)
ATRIAL RATE - MUSE: 55 BPM
BASOPHILS # BLD AUTO: 0.1 10E3/UL (ref 0–0.2)
BASOPHILS NFR BLD AUTO: 1 %
BILIRUB SERPL-MCNC: 0.4 MG/DL (ref 0.2–1.3)
BLD PROD TYP BPU: NORMAL
BLD PROD TYP BPU: NORMAL
BLOOD COMPONENT TYPE: NORMAL
BLOOD COMPONENT TYPE: NORMAL
BUN SERPL-MCNC: 9 MG/DL (ref 7–30)
CALCIUM SERPL-MCNC: 9.3 MG/DL (ref 8.5–10.1)
CHLORIDE BLD-SCNC: 110 MMOL/L (ref 94–109)
CO2 SERPL-SCNC: 22 MMOL/L (ref 20–32)
CODING SYSTEM: NORMAL
CODING SYSTEM: NORMAL
CREAT SERPL-MCNC: 0.69 MG/DL (ref 0.52–1.04)
CROSSMATCH: NORMAL
CROSSMATCH: NORMAL
DIASTOLIC BLOOD PRESSURE - MUSE: NORMAL MMHG
EOSINOPHIL # BLD AUTO: 0.3 10E3/UL (ref 0–0.7)
EOSINOPHIL NFR BLD AUTO: 3 %
ERYTHROCYTE [DISTWIDTH] IN BLOOD BY AUTOMATED COUNT: 12.9 % (ref 10–15)
GFR SERPL CREATININE-BSD FRML MDRD: >90 ML/MIN/1.73M2
GLUCOSE BLD-MCNC: 165 MG/DL (ref 70–99)
HCT VFR BLD AUTO: 37.9 % (ref 35–47)
HGB BLD-MCNC: 10.5 G/DL (ref 11.7–15.7)
HGB BLD-MCNC: 12.6 G/DL (ref 11.7–15.7)
HOLD SPECIMEN: NORMAL
HOLD SPECIMEN: NORMAL
IMM GRANULOCYTES # BLD: 0 10E3/UL
IMM GRANULOCYTES NFR BLD: 0 %
INR PPP: 1.05 (ref 0.85–1.15)
INTERPRETATION ECG - MUSE: NORMAL
LACTATE SERPL-SCNC: 1.1 MMOL/L (ref 0.7–2)
LYMPHOCYTES # BLD AUTO: 3.4 10E3/UL (ref 0.8–5.3)
LYMPHOCYTES NFR BLD AUTO: 35 %
MCH RBC QN AUTO: 29.4 PG (ref 26.5–33)
MCHC RBC AUTO-ENTMCNC: 33.2 G/DL (ref 31.5–36.5)
MCV RBC AUTO: 89 FL (ref 78–100)
MONOCYTES # BLD AUTO: 1 10E3/UL (ref 0–1.3)
MONOCYTES NFR BLD AUTO: 11 %
NEUTROPHILS # BLD AUTO: 4.7 10E3/UL (ref 1.6–8.3)
NEUTROPHILS NFR BLD AUTO: 50 %
NRBC # BLD AUTO: 0 10E3/UL
NRBC BLD AUTO-RTO: 0 /100
P AXIS - MUSE: 78 DEGREES
PLATELET # BLD AUTO: 354 10E3/UL (ref 150–450)
POTASSIUM BLD-SCNC: 3.7 MMOL/L (ref 3.4–5.3)
PR INTERVAL - MUSE: 150 MS
PROT SERPL-MCNC: 6.5 G/DL (ref 6.8–8.8)
QRS DURATION - MUSE: 96 MS
QT - MUSE: 448 MS
QTC - MUSE: 428 MS
R AXIS - MUSE: -10 DEGREES
RBC # BLD AUTO: 4.28 10E6/UL (ref 3.8–5.2)
SODIUM SERPL-SCNC: 143 MMOL/L (ref 133–144)
SPECIMEN EXPIRATION DATE: ABNORMAL
SPECIMEN EXPIRATION DATE: NORMAL
SYSTOLIC BLOOD PRESSURE - MUSE: NORMAL MMHG
T AXIS - MUSE: 57 DEGREES
UNIT ABO/RH: NORMAL
UNIT ABO/RH: NORMAL
UNIT NUMBER: NORMAL
UNIT NUMBER: NORMAL
UNIT STATUS: NORMAL
UNIT STATUS: NORMAL
UNIT TYPE ISBT: 6200
UNIT TYPE ISBT: 6200
VENTRICULAR RATE- MUSE: 55 BPM
WBC # BLD AUTO: 9.5 10E3/UL (ref 4–11)

## 2023-01-05 PROCEDURE — 258N000003 HC RX IP 258 OP 636: Performed by: EMERGENCY MEDICINE

## 2023-01-05 PROCEDURE — 83605 ASSAY OF LACTIC ACID: CPT | Performed by: PHYSICIAN ASSISTANT

## 2023-01-05 PROCEDURE — 86870 RBC ANTIBODY IDENTIFICATION: CPT | Performed by: PHYSICIAN ASSISTANT

## 2023-01-05 PROCEDURE — 36415 COLL VENOUS BLD VENIPUNCTURE: CPT | Performed by: EMERGENCY MEDICINE

## 2023-01-05 PROCEDURE — 250N000011 HC RX IP 250 OP 636: Performed by: EMERGENCY MEDICINE

## 2023-01-05 PROCEDURE — 74177 CT ABD & PELVIS W/CONTRAST: CPT | Mod: MA

## 2023-01-05 PROCEDURE — 88305 TISSUE EXAM BY PATHOLOGIST: CPT | Mod: TC,ORL | Performed by: INTERNAL MEDICINE

## 2023-01-05 PROCEDURE — 36415 COLL VENOUS BLD VENIPUNCTURE: CPT | Performed by: PHYSICIAN ASSISTANT

## 2023-01-05 PROCEDURE — 85610 PROTHROMBIN TIME: CPT | Performed by: EMERGENCY MEDICINE

## 2023-01-05 PROCEDURE — 99222 1ST HOSP IP/OBS MODERATE 55: CPT | Mod: AI | Performed by: INTERNAL MEDICINE

## 2023-01-05 PROCEDURE — 86901 BLOOD TYPING SEROLOGIC RH(D): CPT | Performed by: PHYSICIAN ASSISTANT

## 2023-01-05 PROCEDURE — 120N000001 HC R&B MED SURG/OB

## 2023-01-05 PROCEDURE — 250N000009 HC RX 250: Performed by: PHYSICIAN ASSISTANT

## 2023-01-05 PROCEDURE — 80053 COMPREHEN METABOLIC PANEL: CPT | Performed by: EMERGENCY MEDICINE

## 2023-01-05 PROCEDURE — 93005 ELECTROCARDIOGRAM TRACING: CPT

## 2023-01-05 PROCEDURE — 86922 COMPATIBILITY TEST ANTIGLOB: CPT

## 2023-01-05 PROCEDURE — 85018 HEMOGLOBIN: CPT | Performed by: PHYSICIAN ASSISTANT

## 2023-01-05 PROCEDURE — 250N000011 HC RX IP 250 OP 636: Performed by: PHYSICIAN ASSISTANT

## 2023-01-05 PROCEDURE — 96374 THER/PROPH/DIAG INJ IV PUSH: CPT | Mod: 59

## 2023-01-05 PROCEDURE — 96361 HYDRATE IV INFUSION ADD-ON: CPT

## 2023-01-05 PROCEDURE — 99285 EMERGENCY DEPT VISIT HI MDM: CPT | Mod: 25

## 2023-01-05 PROCEDURE — 85025 COMPLETE CBC W/AUTO DIFF WBC: CPT | Performed by: PHYSICIAN ASSISTANT

## 2023-01-05 RX ORDER — SODIUM CHLORIDE 9 MG/ML
INJECTION, SOLUTION INTRAVENOUS CONTINUOUS
Status: DISCONTINUED | OUTPATIENT
Start: 2023-01-05 | End: 2023-01-06

## 2023-01-05 RX ORDER — IOPAMIDOL 755 MG/ML
74 INJECTION, SOLUTION INTRAVASCULAR ONCE
Status: COMPLETED | OUTPATIENT
Start: 2023-01-05 | End: 2023-01-05

## 2023-01-05 RX ORDER — ONDANSETRON 4 MG/1
4 TABLET, ORALLY DISINTEGRATING ORAL EVERY 6 HOURS PRN
Status: DISCONTINUED | OUTPATIENT
Start: 2023-01-05 | End: 2023-01-08 | Stop reason: HOSPADM

## 2023-01-05 RX ORDER — ONDANSETRON 2 MG/ML
4 INJECTION INTRAMUSCULAR; INTRAVENOUS EVERY 6 HOURS PRN
Status: DISCONTINUED | OUTPATIENT
Start: 2023-01-05 | End: 2023-01-08 | Stop reason: HOSPADM

## 2023-01-05 RX ORDER — CIPROFLOXACIN 500 MG/1
500 TABLET, FILM COATED ORAL 2 TIMES DAILY
Status: DISCONTINUED | OUTPATIENT
Start: 2023-01-05 | End: 2023-01-08 | Stop reason: HOSPADM

## 2023-01-05 RX ORDER — METRONIDAZOLE 250 MG/1
250 TABLET ORAL 3 TIMES DAILY
Status: DISCONTINUED | OUTPATIENT
Start: 2023-01-05 | End: 2023-01-08 | Stop reason: HOSPADM

## 2023-01-05 RX ORDER — ONDANSETRON 2 MG/ML
4 INJECTION INTRAMUSCULAR; INTRAVENOUS EVERY 30 MIN PRN
Status: DISCONTINUED | OUTPATIENT
Start: 2023-01-05 | End: 2023-01-05

## 2023-01-05 RX ADMIN — SODIUM CHLORIDE 1000 ML: 9 INJECTION, SOLUTION INTRAVENOUS at 20:24

## 2023-01-05 RX ADMIN — ONDANSETRON 4 MG: 2 INJECTION INTRAMUSCULAR; INTRAVENOUS at 20:26

## 2023-01-05 RX ADMIN — IOPAMIDOL 74 ML: 755 INJECTION, SOLUTION INTRAVENOUS at 21:01

## 2023-01-05 RX ADMIN — SODIUM CHLORIDE 61 ML: 9 INJECTION, SOLUTION INTRAVENOUS at 21:01

## 2023-01-05 ASSESSMENT — ENCOUNTER SYMPTOMS
VOMITING: 1
ABDOMINAL PAIN: 1
BLOOD IN STOOL: 1

## 2023-01-05 ASSESSMENT — ACTIVITIES OF DAILY LIVING (ADL)
ADLS_ACUITY_SCORE: 35
ADLS_ACUITY_SCORE: 35

## 2023-01-05 NOTE — TELEPHONE ENCOUNTER
Nurse Triage SBAR    Is this a 2nd Level Triage? YES, LICENSED PRACTITIONER REVIEW IS REQUIRED    Situation: Pt calling stating she has noticed her tongue is very white and fuzzy after starting abx on 12/27/22. Believes she has developed thrush. Pt currently taking cipro 500mg and metronidazole 250mg for suspected colitis.Pt denies any fever, pain, sores, SOB, odorous breath. Please advise    Pharmacy pended.     Background: Tx with ABX at 12/26 OV, Colonoscopy done today at [facility].     Assessment: See below    Protocol Recommended Disposition:   Callback by PCP Today    Recommendation:     Callback by PCP today per protocol    Ok to leave detailed VM     Routed to provider    Does the patient meet one of the following criteria for ADS visit consideration? 16+ years old, with an [facility] PCP     TIP  Providers, please consider if this condition is appropriate for management at one of our Acute and Diagnostic Services sites.     If patient is a good candidate, please use dotphrase <dot>triageresponse and select Refer to ADS to document.    Reason for Disposition    Taking antibiotic > 72 hours (3 days) and symptoms (other than fever) not improved    Additional Information    Negative: SEVERE difficulty breathing (e.g., struggling for each breath, speaks in single words)    Negative: Sounds like a life-threatening emergency to the triager    Negative: Sinus infection and taking an antibiotic    Negative: Wound infection and taking an antibiotic    Negative: MODERATE difficulty breathing (e.g., speaks in phrases, SOB even at rest, pulse 100-120)    Negative: Fever > 103 F  (39.4 C)    Negative: Patient sounds very sick or weak to the triager    Negative: Finished taking antibiotics and symptoms are BETTER but are not completely gone    Negative: Patient wants to be seen    Negative: Taking antibiotic and new-onset of fever    Negative: Taking antibiotic > 48 hours (2 days) and fever still present  "(SAME)    Answer Assessment - Initial Assessment Questions  1. INFECTION: \"What infection is the antibiotic being given for?\"      Possible Gi infection    2. ANTIBIOTIC: \"What antibiotic are you taking\" \"How many times per day?\"      Cipro and Flagyl    3. DURATION: \"When was the antibiotic started?\"      12/27/22    4. MAIN CONCERN OR SYMPTOM:  \"What is your main concern right now?\"      Very fuzzy and white     5. BETTER-SAME-WORSE: \"Are you getting better, staying the same, or getting worse compared to when you first started the antibiotics?\" If getting worse, ask: \"In what way?\"       Same     6. FEVER: \"Do you have a fever?\" If Yes, ask: \"What is your temperature, how was it measured, and when did it start?\"      No     7. SYMPTOMS: \"Are there any other symptoms you're concerned about?\" If Yes, ask: \"When did it start?\"      Some tingling on face    8. FOLLOW-UP APPOINTMENT: \"Do you have a follow-up appointment with your doctor?\"      No    Protocols used: INFECTION ON ANTIBIOTIC FOLLOW-UP CALL-A-OH    CALLBACK BY PCP TODAY.      CONTINUE ANTIBIOTIC:   * Continue taking the antibiotic.  * Also continue any other treatment instructions from your doctor.      CALL BACK IF:   * Fever lasts over 48 hours (2 days) on antibiotics  * Symptoms not improving over 72 hours (3 days) on antibiotics  * You become worse        Patient/Caregiver understands and will follow care advice? Yes, plans to follow advice     Kavitha BUTLER RN  EP Triage    "

## 2023-01-06 ENCOUNTER — LAB REQUISITION (OUTPATIENT)
Dept: LAB | Facility: CLINIC | Age: 68
End: 2023-01-06
Payer: MEDICARE

## 2023-01-06 DIAGNOSIS — D12.2 BENIGN NEOPLASM OF ASCENDING COLON: ICD-10-CM

## 2023-01-06 DIAGNOSIS — R93.3 ABNORMAL FINDINGS ON DIAGNOSTIC IMAGING OF OTHER PARTS OF DIGESTIVE TRACT: ICD-10-CM

## 2023-01-06 DIAGNOSIS — K64.8 OTHER HEMORRHOIDS: ICD-10-CM

## 2023-01-06 DIAGNOSIS — K64.4 RESIDUAL HEMORRHOIDAL SKIN TAGS: ICD-10-CM

## 2023-01-06 DIAGNOSIS — R10.32 LEFT LOWER QUADRANT PAIN: ICD-10-CM

## 2023-01-06 DIAGNOSIS — K57.30 DIVERTICULOSIS OF LARGE INTESTINE WITHOUT PERFORATION OR ABSCESS WITHOUT BLEEDING: ICD-10-CM

## 2023-01-06 LAB
HGB BLD-MCNC: 10 G/DL (ref 11.7–15.7)
HGB BLD-MCNC: 9.4 G/DL (ref 11.7–15.7)
HGB BLD-MCNC: 9.5 G/DL (ref 11.7–15.7)

## 2023-01-06 PROCEDURE — 85018 HEMOGLOBIN: CPT | Performed by: HOSPITALIST

## 2023-01-06 PROCEDURE — 258N000003 HC RX IP 258 OP 636: Performed by: HOSPITALIST

## 2023-01-06 PROCEDURE — 999N000001 HC CANCELLED SURGERY UP TO 15 MINS: Performed by: INTERNAL MEDICINE

## 2023-01-06 PROCEDURE — 85018 HEMOGLOBIN: CPT | Performed by: INTERNAL MEDICINE

## 2023-01-06 PROCEDURE — 85018 HEMOGLOBIN: CPT | Performed by: PHYSICIAN ASSISTANT

## 2023-01-06 PROCEDURE — 120N000001 HC R&B MED SURG/OB

## 2023-01-06 PROCEDURE — 258N000003 HC RX IP 258 OP 636: Performed by: INTERNAL MEDICINE

## 2023-01-06 PROCEDURE — 250N000013 HC RX MED GY IP 250 OP 250 PS 637: Performed by: INTERNAL MEDICINE

## 2023-01-06 PROCEDURE — 36415 COLL VENOUS BLD VENIPUNCTURE: CPT | Performed by: INTERNAL MEDICINE

## 2023-01-06 PROCEDURE — 99232 SBSQ HOSP IP/OBS MODERATE 35: CPT | Performed by: HOSPITALIST

## 2023-01-06 PROCEDURE — 36415 COLL VENOUS BLD VENIPUNCTURE: CPT | Performed by: HOSPITALIST

## 2023-01-06 PROCEDURE — 36415 COLL VENOUS BLD VENIPUNCTURE: CPT | Performed by: PHYSICIAN ASSISTANT

## 2023-01-06 PROCEDURE — 258N000003 HC RX IP 258 OP 636: Performed by: EMERGENCY MEDICINE

## 2023-01-06 RX ORDER — PROCHLORPERAZINE MALEATE 5 MG
5 TABLET ORAL EVERY 6 HOURS PRN
Status: DISCONTINUED | OUTPATIENT
Start: 2023-01-06 | End: 2023-01-08 | Stop reason: HOSPADM

## 2023-01-06 RX ORDER — SODIUM CHLORIDE 9 MG/ML
INJECTION, SOLUTION INTRAVENOUS CONTINUOUS
Status: DISCONTINUED | OUTPATIENT
Start: 2023-01-06 | End: 2023-01-06

## 2023-01-06 RX ORDER — ACETAMINOPHEN 650 MG/1
650 SUPPOSITORY RECTAL EVERY 6 HOURS PRN
Status: DISCONTINUED | OUTPATIENT
Start: 2023-01-06 | End: 2023-01-08 | Stop reason: HOSPADM

## 2023-01-06 RX ORDER — DIPHENHYDRAMINE HYDROCHLORIDE AND LIDOCAINE HYDROCHLORIDE AND ALUMINUM HYDROXIDE AND MAGNESIUM HYDRO
10 KIT EVERY 6 HOURS PRN
Status: DISCONTINUED | OUTPATIENT
Start: 2023-01-06 | End: 2023-01-08 | Stop reason: HOSPADM

## 2023-01-06 RX ORDER — ACETAMINOPHEN 325 MG/1
650 TABLET ORAL EVERY 6 HOURS PRN
Status: DISCONTINUED | OUTPATIENT
Start: 2023-01-06 | End: 2023-01-08 | Stop reason: HOSPADM

## 2023-01-06 RX ORDER — LIDOCAINE 40 MG/G
CREAM TOPICAL
Status: DISCONTINUED | OUTPATIENT
Start: 2023-01-06 | End: 2023-01-08 | Stop reason: HOSPADM

## 2023-01-06 RX ORDER — SODIUM CHLORIDE 9 MG/ML
INJECTION, SOLUTION INTRAVENOUS CONTINUOUS
Status: DISCONTINUED | OUTPATIENT
Start: 2023-01-06 | End: 2023-01-07

## 2023-01-06 RX ORDER — PROCHLORPERAZINE 25 MG
12.5 SUPPOSITORY, RECTAL RECTAL EVERY 12 HOURS PRN
Status: DISCONTINUED | OUTPATIENT
Start: 2023-01-06 | End: 2023-01-08 | Stop reason: HOSPADM

## 2023-01-06 RX ADMIN — METRONIDAZOLE 250 MG: 250 TABLET ORAL at 01:07

## 2023-01-06 RX ADMIN — CIPROFLOXACIN HYDROCHLORIDE 500 MG: 500 TABLET, FILM COATED ORAL at 08:42

## 2023-01-06 RX ADMIN — SODIUM CHLORIDE: 9 INJECTION, SOLUTION INTRAVENOUS at 14:37

## 2023-01-06 RX ADMIN — METRONIDAZOLE 250 MG: 250 TABLET ORAL at 20:21

## 2023-01-06 RX ADMIN — METRONIDAZOLE 250 MG: 250 TABLET ORAL at 08:42

## 2023-01-06 RX ADMIN — CIPROFLOXACIN HYDROCHLORIDE 500 MG: 500 TABLET, FILM COATED ORAL at 01:07

## 2023-01-06 RX ADMIN — SODIUM CHLORIDE: 9 INJECTION, SOLUTION INTRAVENOUS at 01:10

## 2023-01-06 RX ADMIN — METRONIDAZOLE 250 MG: 250 TABLET ORAL at 14:31

## 2023-01-06 RX ADMIN — CIPROFLOXACIN HYDROCHLORIDE 500 MG: 500 TABLET, FILM COATED ORAL at 20:21

## 2023-01-06 RX ADMIN — SODIUM CHLORIDE: 9 INJECTION, SOLUTION INTRAVENOUS at 10:14

## 2023-01-06 RX ADMIN — POLYETHYLENE GLYCOL 3350, SODIUM SULFATE ANHYDROUS, SODIUM BICARBONATE, SODIUM CHLORIDE, POTASSIUM CHLORIDE 4000 ML: 236; 22.74; 6.74; 5.86; 2.97 POWDER, FOR SOLUTION ORAL at 17:58

## 2023-01-06 RX ADMIN — ACETAMINOPHEN 650 MG: 325 TABLET, FILM COATED ORAL at 10:13

## 2023-01-06 RX ADMIN — SODIUM CHLORIDE: 9 INJECTION, SOLUTION INTRAVENOUS at 02:11

## 2023-01-06 ASSESSMENT — ACTIVITIES OF DAILY LIVING (ADL)
ADLS_ACUITY_SCORE: 19
ADLS_ACUITY_SCORE: 35
ADLS_ACUITY_SCORE: 19

## 2023-01-06 NOTE — PROGRESS NOTES
RECEIVING UNIT ED HANDOFF REVIEW    ED Nurse Handoff Report was reviewed by: Dany Zhao RN on January 6, 2023 at 1:24 AM

## 2023-01-06 NOTE — ED TRIAGE NOTES
Patient had a colonoscopy today. Since 1900 patient has been passing a large amount of bright blood and dark black blood clots. HX of a bowel obstruction a month ago with an infection.      Triage Assessment     Row Name 01/05/23 2003       Triage Assessment (Adult)    Airway WDL WDL       Respiratory WDL    Respiratory WDL WDL       Skin Circulation/Temperature WDL    Skin Circulation/Temperature WDL all    Skin Circulation pallor       Cardiac WDL    Cardiac WDL WDL       Peripheral/Neurovascular WDL    Peripheral Neurovascular WDL WDL       Cognitive/Neuro/Behavioral WDL    Cognitive/Neuro/Behavioral WDL WDL

## 2023-01-06 NOTE — PLAN OF CARE
Goal Outcome Evaluation:     [id number]  Summary:   VSS on RA . Ortho Bp done, WNL. Pt denies dizziness at this moment. She does state that she doesn't feel strong enough to get up and move around her room herself. Bed alarm on and pt educated to call for help before getting up. AOX4. Clear liquid diet. Denies nausea or pain. No bm during shift. PIV infusing NS. Hgb checks Q6 hours (recent check is 10). No signs of bleeding noted. Plan for GI consult in AM. Discharge pending.

## 2023-01-06 NOTE — CONSULTS
"BRIEF NUTRITION ASSESSMENT      REASON FOR ASSESSMENT:  Izabella Concepcion is a 67 year old female seen by Registered Dietitian for Admission Nutrition Risk Screen:  Have you recently lost weight without trying? \"2-13#\"  Have you been eating poorly because of a decreased appetite? \"YES\"      CURRENT DIET AND INTAKE:  Diet:  Full Liquids              Chart reviewed  1/5: She had a colonoscopy with polyp removal this morning performed by Dr. Tinsley, and began passing large amounts of bright red blood and coffee ground appearing stools around 1900 this evening.   Note pt with prior 3 weeks of constipation  12/28: CT --> dx colitis     Visited with pt this afternoon  Notes that over the past few weeks, she has been eating less 2' to constipation  Pt follows a regular diet at home  Is very hopeful that her GI issues are improving and she will be able to eat again  Planning to order some lunch - \"everything sounds good - I want to order 1 of everything!\"      ANTHROPOMETRICS:  Height:5'3\"  Weight:  no wt taken yet this admit  IBW:  52.3 kg  Weight History:   Pt states she weighed herself at home yesterday - was 147#.    Has lost ~6# (4%) in the past 1.5 weeks.    Pt feels her wt is down from having issues with constipation/colitis and eating less because of abdominal discomfort - also doing colonoscopy prep.  Overall, she is down ~20# (12%) from 6 months ago    Wt Readings from Last 10 Encounters:   12/26/22 69.4 kg (153 lb)   07/18/22 75.3 kg (166 lb)   07/14/22 75.8 kg (167 lb 3.2 oz)   03/08/22 77.6 kg (171 lb)   10/05/21 72.8 kg (160 lb 8 oz)   07/23/21 68 kg (150 lb)   02/09/20 68 kg (150 lb)   01/23/19 73 kg (160 lb 14.4 oz)   11/07/18 71.7 kg (158 lb)   10/21/18 70.3 kg (155 lb)         LABS:  1/5: Hgb 12.6 --> 10.5  1/6: Hgb 10.0 --> 9.5 (L)    MALNUTRITION:  Moderate malnutrition in the context of acute illness/injury     % Weight Loss:  > 2% in 1 week (severe malnutrition) - 4% wt loss past 1.5 weeks  % Intake:  <75% " for > 7 days (moderate malnutrition) - decreased intake past few weeks  Subcutaneous Fat Loss:  None observed  Muscle Loss:  Clavicle bone region - mild depletion  Fluid Retention:  None noted    NUTRITION INTERVENTION:  Nutrition Diagnosis:  No nutrition diagnosis at this time.    Implementation:  Nutrition Education ---> Reviewed current diet order and meal ordering process.    FOLLOW UP/MONITORING:   Will re-evaluate in 7 - 10 days, or sooner, if re-consulted.

## 2023-01-06 NOTE — PLAN OF CARE
A&Ox4. VSS. Mild headache controlled w/ tylenol. BS active, passing flatus, large bloody stool x2 today, appears to be a mixture of dark red and bright red blood. Voiding adequately in BR. Up w/ SBA, pt c/o lightheadedness when ambulating this AM, resolved, BP stable. Ambulating in halls. Tolerating CLD. Hgb checks Q6, last check 9.4. GI consulted. Pt to have another colonoscopy tomorrow. Bowel prep initiated.

## 2023-01-06 NOTE — UTILIZATION REVIEW
Admission Status; Secondary Review Determination         Under the authority of the Utilization Management Committee, the utilization review process indicated a secondary review on the above patient.  The review outcome is based on review of the medical records, discussions with staff, and applying clinical experience noted on the date of the review.        (x)      Inpatient Status Appropriate - This patient's medical care is consistent with medical management for inpatient care and reasonable inpatient medical practice.       RATIONALE FOR DETERMINATION   The patient is a 67-year-old female admitted on 1/5/2023.  The patient did have a colonoscopy done on on 1/5/2023 as an outpatient.  There is a 10 mm cecal polyp that was removed with a hot snare.  She came to the ED because of 2 episodes of watery bright and dark red stools.  She is noted to have a hemoglobin that was 14.7 on 12/28/2022.  Hemoglobin has dropped already to 10.0.  GI consult was obtained with recommendation for every 6 hour hemoglobin.  Patient also had a CT scan on of the abdomen which showed signs of colitis.  Current CT shows no signs of colonic perforation.  Based on very significant drop in hemoglobin and concerns of bleeding may continue, recommend continuation of inpatient status.  If bleeding stops and stabilizes at this point, consideration for observation status can be done tomorrow.      The severity of illness, intensity of service provided, expected LOS and risk for adverse outcome make the care complex, high risk and appropriate for hospital admission.        The information on this document is developed by the utilization review team in order for the business office to ensure compliance.  This only denotes the appropriateness of proper admission status and does not reflect the quality of care rendered.         The definitions of Inpatient Status and Observation Status used in making the determination above are those provided in  the CMS Coverage Manual, Chapter 1 and Chapter 6, section 70.4.      Sincerely,     Mauricio Ross MD  Physician Advisor  Utilization Review/ Case Management  [facility].

## 2023-01-06 NOTE — PROGRESS NOTES
[facility]  Hospitalist Progress Note   01/06/2023          Assessment and Plan:         Izabella Concepcion is a 67 year old female with recent colitis, hyperlipidemia admitted on 1/5/2023 with bright red blood per rectum     Postprocedural GI bleed.  S/p colonoscopy 1/5  Patient had colonoscopy on 1/5 with polyp removal.  Postprocedure noted BRBPR and coffee ground stools.  Associated left-sided abdominal pain.     Hgb 12.6->10.5. lactic normal.  No leukocytosis.  Electrolytes, liver enzymes within normal limits.  CT a/p in ED descending/sigmoid colitis.   [facility] following. Discussed GI JUAN - no plan for endoscopic evaluation today unless significant bleeding.  Appreciate comanagement.  Advance diet as tolerated.  Discontinue IV fluids.     Acute anemia likely from GI blood loss, competent of dilutional.   Baseline hemoglobin around 13.   Presented with hemoglobin of 12.6, dropped to 9.5.  Reports intermittent blood in the stools.  Now hemoglobin 9.5.  Discontinue IV fluids, monitor hemoglobin level every 8 hours.  Will transfuse for hemoglobin less than 7.    Addendum at 2:30 PM.  Discussed with floor RN, plans for colonoscopy today evening as patient having blood in the stools.  N.p.o. place.  IV fluids ordered.  Monitor hemoglobin levels every 6 hours.     Near syncope- 2/2 blood loss and vasovagal  While waiting in ED with near syncopal/ syncopal episode x 2.   Both happened after BMs in ED. EKG sinus bradycardia.  Orthostasis this morning negative.  Will start on telemetry monitoring.  Fall precautions.  Telemetry monitoring.  Ambulate with assistance     Recent colitis  Dx 12/28 by CT. Was started on cipro/ flagyl for this for 14 days through 1/11.   Continue cipro/ flagyl     Hyperlipidemia  PTA not on statin therapy.  Defer to primary care provider.    Orders Placed This Encounter      Advance Diet as Tolerated: Clear Liquid Diet; Regular Diet Adult      DVT Prophylaxis: SCDs,  ambulate.  Code Status: Full Code  Disposition: Expected discharge likely tomorrow pending stable hemoglobin.    Discussed with patient, daughter by the bedside, bedside RN, GI JUAN.   >35 minutes spent by me on the date of service doing chart review, history, exam, documentation & further activities per the note.        Emmy Mccollum MD        Interval History:      Patient lying in bed.  Reports feeling weak, not strong enough to move around the room.  No dizziness or vision disturbance at this time.  No chest pain at this time.  No shortness of breath.  Reports some blood in the stools, mixed with stool  Orthostasis this morning negative  Patient's daughter by the bedside, concerns regarding transition plans       Physical Exam:        Physical Exam   Temp:  [97.5  F (36.4  C)-98.3  F (36.8  C)] 97.8  F (36.6  C)  Pulse:  [64-90] 83  Resp:  [17-18] 17  BP: (104-136)/(56-81) 136/81  SpO2:  [97 %-99 %] 99 %    PHYSICAL EXAM  GENERAL: Patient is in no distress. Alert and oriented.  HEENT:  Extraocular muscle movements intact  HEART: Regular rate and rhythm. S1S2. No murmurs  LUNGS: Respirations unlabored  ABDOMEN: Soft,bowel sounds heard   NEURO: Moving all extremities  EXTREMITIES: No pedal edema.  SKIN: Warm, dry. No rash   PSYCHIATRY Cooperative       Medications:          ciprofloxacin  500 mg Oral BID     metroNIDAZOLE  250 mg Oral TID     sodium chloride (PF)  3 mL Intracatheter Q8H     acetaminophen **OR** acetaminophen, lidocaine 4%, lidocaine (buffered or not buffered), Magic Mouthwash, melatonin, ondansetron **OR** ondansetron, prochlorperazine **OR** prochlorperazine **OR** prochlorperazine, sodium chloride (PF)         Data:      All new lab and imaging data was reviewed.

## 2023-01-06 NOTE — ED PROVIDER NOTES
History     Chief Complaint:  Rectal Bleeding       HPI   Izabella Concepcion is a 67 year old female who presents with rectal bleeding. The patient was brought into the room after having a pre-syncopal episode in the waiting room here around 2015. She had a colonoscopy with polyp removal this morning performed by Dr. Tinsley, and began passing large amounts of bright red blood and coffee ground appearing stools around 1900 this evening. She was not having any abdominal pain earlier today, but is having some left-sided abdominal pain here. She has also had vomiting while in the waiting room here. She denies hematemesis. No history of bleeding problems. Not on anticoagulation. She does have a history of a bowel obstruction about a month ago with an infection.     Independent Historian: patient     Review of External Notes: I reviewed gastroenterology note from 12/27 noting IBD.    X-ray Abdomen - 12/26/22  IMPRESSION: Negative abdomen. Bowel gas pattern is normal. Nothing for obstruction or free air. No evidence for renal stones. Mild stool burden. Left hip arthroplasty redemonstrated.    CT Abdomen/Pelvis w/ Contrast - 12/26/22  IMPRESSION:   1.  Wall thickening involving the descending and sigmoid colon and rectum concerning for an acute infectious or inflammatory colitis. No evidence for obstruction.    ROS:  Review of Systems   Gastrointestinal: Positive for abdominal pain, blood in stool and vomiting.   All other systems reviewed and are negative.      Allergies:  Blood Transfusion Related  Vigamox    Medications:    Aspirin 81 mg  Ciprofloxacin   Metronidazole  Trazodone    Past Medical History:    Hyperlipidemia   Asthma  Colon polyps  Seizures  ANISHA    Past Surgical History:    Tonsillectomy  Adenoidectomy   Right hip arthroplasty   Right arm ORIF  Wrist hardware removal   Bone graft hip to ankle     Family History:    Mother- hypertension, diabetes mellitus, CVA  Father- MI, diabetes mellitus   Brother-  narcolepsy, heart disease with 5 stents  Cousin- colon cancer    Social History:  The patient presents here via private vehicle  Family members at bedside  Previous smoker  PCP: Shawanda Doran     Physical Exam     Patient Vitals for the past 24 hrs:   BP Temp Pulse Resp SpO2   01/05/23 2002 136/79 97.5  F (36.4  C) 90 18 97 %        Physical Exam  General: Awake, alert, non-toxic.  Head:  Scalp is NC/AT  Eyes:  Conjunctiva normal, PERRL  ENT:  The external nose and ears are normal.   Neck:  Normal range of motion without rigidity.  CV:  Regular rate and rhythm    No pathologic murmur, rubs, or gallops.  Resp:  Breath sounds are clear bilaterally    Non-labored, no retractions or accessory muscle use  Abdomen: Abdomen is soft, no distension, mild diffuse tenderness, no masses.  Rectal exam with small amount of dark blood no active bleeding.  (Performed in the presence of female chaperone Jerilyn SANTOS)  MS:  No lower extremity edema or asymmetric calf swelling.   Skin:  Warm and dry, No rash or lesions noted.  Neuro: Alert and oriented.  GCS 15 No gross motor deficits.  No facial asymmetry.  Psych:  Awake. Alert. Normal affect. Appropriate interactions.    Emergency Department Course   ECG  ECG taken at 2031, ECG read at 2031  Sinus bradycardia; low voltage QRS; incomplete RBBB; cannot rule out anterior infarct, age undetermined   Bradycardia and RBBB new as compared to prior, dated 7/10/21.  Rate 55 bpm. ID interval 150 ms. QRS duration 96 ms. QT/QTc 448/428 ms. P-R-T axes 78 -10 57.     Imaging:  Abd/pelvis CT,  IV  contrast only TRAUMA / AAA   Final Result   IMPRESSION:    1.  Wall thickening is seen in the lower descending and sigmoid colon compatible with a segmental colitis.      2.  New metallic clip within the cecum near the ileocecal valve.      3.  Right hip arthroplasty. Stable chronic compression of L1.         Report per radiology    Laboratory:  Labs Ordered and Resulted from Time of ED Arrival to Time of ED  Departure   COMPREHENSIVE METABOLIC PANEL - Abnormal       Result Value    Sodium 143      Potassium 3.7      Chloride 110 (*)     Carbon Dioxide (CO2) 22      Anion Gap 11      Urea Nitrogen 9      Creatinine 0.69      Calcium 9.3      Glucose 165 (*)     Alkaline Phosphatase 53      AST 22      ALT 13      Protein Total 6.5 (*)     Albumin 3.7      Bilirubin Total 0.4      GFR Estimate >90     HEMOGLOBIN - Abnormal    Hemoglobin 10.5 (*)    TYPE AND SCREEN, ADULT - Abnormal    ABO/RH(D) A POS      Antibody Screen Positive (*)     SPECIMEN EXPIRATION DATE [id number]     INR - Normal    INR 1.05     LACTIC ACID WHOLE BLOOD - Normal    Lactic Acid 1.1     CBC WITH PLATELETS AND DIFFERENTIAL    WBC Count 9.5      RBC Count 4.28      Hemoglobin 12.6      Hematocrit 37.9      MCV 89      MCH 29.4      MCHC 33.2      RDW 12.9      Platelet Count 354      % Neutrophils 50      % Lymphocytes 35      % Monocytes 11      % Eosinophils 3      % Basophils 1      % Immature Granulocytes 0      NRBCs per 100 WBC 0      Absolute Neutrophils 4.7      Absolute Lymphocytes 3.4      Absolute Monocytes 1.0      Absolute Eosinophils 0.3      Absolute Basophils 0.1      Absolute Immature Granulocytes 0.0      Absolute NRBCs 0.0     ANTIBODY IDENTIFICATION    Antibody Identification Anti-E      SPECIMEN EXPIRATION DATE [id number]     PREPARE RED BLOOD CELLS (UNIT)    Blood Component Type Red Blood Cells      Product Code X6673V17      Unit Status Ready for issue      Unit Number [id number]      CROSSMATCH COMPATIBLE      CODING SYSTEM [id number]     PREPARE RED BLOOD CELLS (UNIT)    Blood Component Type Red Blood Cells      Product Code [id number]      Unit Status Ready for issue      Unit Number [id number]      CROSSMATCH COMPATIBLE      CODING SYSTEM [id number]     ABO/RH TYPE AND SCREEN        Emergency Department Course & Assessments:     Interventions:  2024: NS 1L IV Bolus   2026: Zofran 4 mg IV     Independent Interpretation  (X-rays, CTs, rhythm strip):  Reviewed CT scan, agree with mild colonic inflammation.    Consultations/Discussion of Management or Tests:   I spoke with Dr. Barrett from GI regarding the patient's presentation.    I explained the laboratory and imaging. Will plan to call Dr. Barrett again for consult.    I spoke with Dr. Barrett regarding the imaging results. Plan to keep overnight to conduct serial hemoglobins. He will come and scope if there is further episodes of bleeding.    I spoke to Dr. Pretty from hospitalist service regarding the patient's presentation and workup.     Disposition:  The patient was admitted to the hospital under the care of Dr. Pretty.     Impression & Plan      Medical Decision Makin-year-old female who presents for evaluation of hematochezia near syncopal episode after colonoscopy this morning and several episodes of hematochezia.  Broad differential considered.  Initial EKG with mild sinus bradycardia no evidence of arrhythmia does show incomplete right bundle branch block.  No chest pain shortness of breath or cardiopulmonary symptoms to suggest cardiac episode.  Hemoglobin is decreased some from baseline decreased somewhat further on recheck though did receive fluids could be in part delusional has not passed any additional episodes of bloody stool since being brought back to her room.  Vital stable no indication for transfusion at this time.  Not anticoagulated no evidence of coagulopathy.  No hematemesis very low suspicion for upper GI bleed strongly suspect post polypectomy bleeding versus due to colitis.  Likely inflammatory no indication for antibiotics.  Discussed with Dr. Barrett from GI, greatly appreciate his assistance.  We will plan for admission overnight for serial hemoglobins and monitoring he is to be contacted if becomes unstable or has multiple episodes of additional bloody stool passage should intervention become required.  Patient in agreement  with plan.  Discussed with hospitalist who agrees to accept.    Diagnosis:    ICD-10-CM    1. Hematochezia  K92.1       2. Anemia due to blood loss, acute  D62       3. Syncope  R55       4. Colonoscopy causing post-procedural bleeding  K91.840              Scribe Disclosure:  I, Myra Beatty, am serving as a scribe at 8:21 PM on 1/5/2023 to document services personally performed by Ilya Khan PA-C based on my observations and the provider's statements to me.     1/5/2023   Ilya Khan PA-C Etten, Clark Ellsworth, PA-C  01/05/23 [id number]

## 2023-01-06 NOTE — ED NOTES
"[facility]  ED Nurse Handoff Report    ED Chief complaint: Rectal Bleeding      ED Diagnosis:   Final diagnoses:   Hematochezia   Anemia due to blood loss, acute   Syncope   Colonoscopy causing post-procedural bleeding       Code Status: Admitting MD to establish with patient.    Allergies:   Allergies   Allergen Reactions    Blood Transfusion Related (Informational Only) Other (See Comments)     Patient has a history of a clinically significant antibody against RBC antigens.  A delay in compatible RBCs may occur.    Dogs Itching    Nickel      Other reaction(s): Other (see comments)  Unknown.      Nickel (non food)    Pollen Extract      Other reaction(s): Other (see comments)  unknown    Vigamox [Moxifloxacin] Swelling       Patient Story: Patient had a colonoscopy today. Since 1900 patient has been passing a large amount of bright blood and dark black blood clots. HX of a bowel obstruction a month ago with an infection.    Focused Assessment:  Near syncope episode in triage waiting area; denies pain; bradycardic on monitor.     Treatments and/or interventions provided: IV inserted, IV fluids, labs    Patient's response to treatments and/or interventions: Pt reports \"feeling better\" after fluids; see MAR; see results    To be done/followed up on inpatient unit:  See orders.    Does this patient have any cognitive concerns?:  No    Activity level - Baseline/Home:  Independent  Activity Level - Current:   Independent    Patient's Preferred language: English   Needed?: No    Isolation: None  Infection: Not Applicable  Patient tested for COVID 19 prior to admission: NO  Bariatric?: No    Vital Signs:   Vitals:    01/05/23 2002   BP: 136/79   BP Location: Left arm   Pulse: 90   Resp: 18   Temp: 97.5  F (36.4  C)   SpO2: 97%       Cardiac Rhythm:     Was the PSS-3 completed:   Yes  What interventions are required if any?               Family Comments: Daughter at bedside  OBS brochure/video " discussed/provided to patient/family: N/A              Name of person given brochure if not patient:               Relationship to patient:     For the majority of the shift this patient's behavior was Green.   No behavioral interventions performed.    ED NURSE PHONE NUMBER: [phone]

## 2023-01-06 NOTE — PROGRESS NOTES
SPIRITUAL HEALTH SERVICES Progress Note  [facility] General Surgery    Saw pt Izabella Concepcion per admissions request.  intern Thomas Reed was in present. Introduced myself and SHS, I assessed for spiritual needs. Pt spoke about her Religion jacqueline, her trip to [place], and her illness narrative. I provided emotional support, reflective listening, and said a prayer.     SHS remain available.     Lena Babcock M.Div  Chaplain Resident   [phone]

## 2023-01-06 NOTE — PROVIDER NOTIFICATION
MD Notification    Notified Person: MD    Notified Person Name: KIM Krishna    Notification Date/Time: 1/6 1400    Notification Interaction: Phone    Purpose of Notification: Pt had bloody stool, looks like a mixture of dark and bright blood. Very anxious, wants MD to come look. Thanks!    Orders Received: Colonoscopy today, NPO for procedure    Comments:

## 2023-01-06 NOTE — ED NOTES
Rapid Assessment Note    History:   Izabella Concepcion is a 67 year old female who presents with rectal bleeding and syncope. Patient reports she had a colonoscopy and colon polyp excision today. Her family member reports she passed more than a tablespoon of blood and she noticed some clots as well. Patient states she initially did not know there was bleeding. She endorses a headache as well as left hand and left leg numbness.  She also has passed out once or twice since this started.  She reportedly vomited in the ED, but reports no nausea at this time. She reports no use of blood thinners. Dr. Barrett is her GI doctor. She reports no abdominal pain.     Patient states she feels like she is about to pass out in ED.    Exam:   General:  Alert, interactive, but appears quite uncomfortable and pale  Cardiovascular:  Well perfused  Lungs:  No respiratory distress, no accessory muscle use  Neuro:  Moving all 4 extremities  Skin:  Warm, dry  Psych:  Normal affect      Plan of Care:   I evaluated the patient and developed an initial plan of care. I discussed this plan and explained that I, or one of my partners, would be returning to complete the evaluation.     I, ZAKIA MELCHOR, am serving as a scribe to document services personally performed by Dr. Garay, based on my observations and the provider's statements to me.    1/5/2023  [facility] [facility] [employer]    Portions of this medical record were completed by a scribe. UPON MY REVIEW AND AUTHENTICATION BY ELECTRONIC SIGNATURE, this confirms (a) I performed the applicable clinical services, and (b) the record is accurate.        Juventino Garay MD  01/06/23 0032

## 2023-01-06 NOTE — TELEPHONE ENCOUNTER
Pt calling from hospital stating she will not be able to make VV. Assured pt that providers are aware when pts are admitted. Told to call clinic back after her hospitalization as needed to schedule a hospital follow up appt with PCP. Pt verbalized understanding.    Kavitha BUTLER RN  EP Triage
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
## 2023-01-06 NOTE — TELEPHONE ENCOUNTER
Call to patient. Detailed message left informing patient of Dr. Doran's below response and contact number for clinic.     Will also route to team coordinators to please call patient and assist them with scheduling per Dr. Doran. Thank you!        Annemarie Greco, RN BSN MSN  [facility]
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
## 2023-01-06 NOTE — PHARMACY-ADMISSION MEDICATION HISTORY
Pharmacy Medication History  Admission medication history interview status for the 1/5/2023  admission is complete. See EPIC admission navigator for prior to admission medications     Location of Interview: Patient room  Medication history sources: Patient and Surescripts    Significant changes made to the medication list:  Removed: ASA, vitD, NS flush, trazodone, nerium    In the past week, patient estimated taking medication this percent of the time: greater than 90%    Additional medication history information:   Patient reported not taking any meds other than her abx at this time. She started both abx on 12/27/22 and has only taken 1 dose of each today.    Medication reconciliation completed by provider prior to medication history? No    Time spent in this activity: 15 mins    Prior to Admission medications    Medication Sig Last Dose Taking? Auth Provider Long Term End Date   ciprofloxacin (CIPRO) 500 MG tablet Take 1 tablet (500 mg) by mouth 2 times daily for 14 days 1/5/2023 at AM Yes Ximena Vasquez PA-C  1/9/23   metroNIDAZOLE (FLAGYL) 500 MG tablet Take 0.5 tablets (250 mg) by mouth 3 times daily for 14 days 1/5/2023 at AM Yes Ximena Vasquez PA-C  1/9/23       The information provided in this note is only as accurate as the sources available at the time of update(s)     Zaira Lo PharmD

## 2023-01-07 LAB
ANION GAP SERPL CALCULATED.3IONS-SCNC: 10 MMOL/L (ref 3–14)
BUN SERPL-MCNC: 3 MG/DL (ref 7–30)
CALCIUM SERPL-MCNC: 8.9 MG/DL (ref 8.5–10.1)
CHLORIDE BLD-SCNC: 107 MMOL/L (ref 94–109)
CO2 SERPL-SCNC: 25 MMOL/L (ref 20–32)
COLONOSCOPY: NORMAL
CREAT SERPL-MCNC: 0.54 MG/DL (ref 0.52–1.04)
ERYTHROCYTE [DISTWIDTH] IN BLOOD BY AUTOMATED COUNT: 12.8 % (ref 10–15)
GFR SERPL CREATININE-BSD FRML MDRD: >90 ML/MIN/1.73M2
GLUCOSE BLD-MCNC: 78 MG/DL (ref 70–99)
HCT VFR BLD AUTO: 33.1 % (ref 35–47)
HGB BLD-MCNC: 10.8 G/DL (ref 11.7–15.7)
HGB BLD-MCNC: 8.8 G/DL (ref 11.7–15.7)
MCH RBC QN AUTO: 28.7 PG (ref 26.5–33)
MCHC RBC AUTO-ENTMCNC: 32.6 G/DL (ref 31.5–36.5)
MCV RBC AUTO: 88 FL (ref 78–100)
PLATELET # BLD AUTO: 280 10E3/UL (ref 150–450)
POTASSIUM BLD-SCNC: 3.4 MMOL/L (ref 3.4–5.3)
RBC # BLD AUTO: 3.76 10E6/UL (ref 3.8–5.2)
SODIUM SERPL-SCNC: 142 MMOL/L (ref 133–144)
WBC # BLD AUTO: 5.8 10E3/UL (ref 4–11)

## 2023-01-07 PROCEDURE — 80048 BASIC METABOLIC PNL TOTAL CA: CPT | Performed by: HOSPITALIST

## 2023-01-07 PROCEDURE — 250N000013 HC RX MED GY IP 250 OP 250 PS 637: Performed by: INTERNAL MEDICINE

## 2023-01-07 PROCEDURE — 120N000001 HC R&B MED SURG/OB

## 2023-01-07 PROCEDURE — 99232 SBSQ HOSP IP/OBS MODERATE 35: CPT | Performed by: HOSPITALIST

## 2023-01-07 PROCEDURE — 250N000011 HC RX IP 250 OP 636: Performed by: INTERNAL MEDICINE

## 2023-01-07 PROCEDURE — G0500 MOD SEDAT ENDO SERVICE >5YRS: HCPCS | Performed by: INTERNAL MEDICINE

## 2023-01-07 PROCEDURE — 0W3P8ZZ CONTROL BLEEDING IN GASTROINTESTINAL TRACT, VIA NATURAL OR ARTIFICIAL OPENING ENDOSCOPIC: ICD-10-PCS | Performed by: INTERNAL MEDICINE

## 2023-01-07 PROCEDURE — 45382 COLONOSCOPY W/CONTROL BLEED: CPT | Performed by: INTERNAL MEDICINE

## 2023-01-07 PROCEDURE — 36415 COLL VENOUS BLD VENIPUNCTURE: CPT | Performed by: HOSPITALIST

## 2023-01-07 PROCEDURE — 85018 HEMOGLOBIN: CPT | Performed by: HOSPITALIST

## 2023-01-07 PROCEDURE — 272N000104 HC DEVICE CLIP RESOLUTION, EACH: Performed by: INTERNAL MEDICINE

## 2023-01-07 RX ORDER — NALOXONE HYDROCHLORIDE 0.4 MG/ML
0.2 INJECTION, SOLUTION INTRAMUSCULAR; INTRAVENOUS; SUBCUTANEOUS
Status: CANCELLED | OUTPATIENT
Start: 2023-01-07

## 2023-01-07 RX ORDER — FENTANYL CITRATE 50 UG/ML
INJECTION, SOLUTION INTRAMUSCULAR; INTRAVENOUS PRN
Status: DISCONTINUED | OUTPATIENT
Start: 2023-01-07 | End: 2023-01-07 | Stop reason: HOSPADM

## 2023-01-07 RX ORDER — LORAZEPAM 2 MG/ML
0.5 INJECTION INTRAMUSCULAR ONCE
Status: COMPLETED | OUTPATIENT
Start: 2023-01-07 | End: 2023-01-07

## 2023-01-07 RX ORDER — EPINEPHRINE IN SOD CHLOR,ISO 1 MG/10 ML
SYRINGE (ML) INTRAVENOUS PRN
Status: DISCONTINUED | OUTPATIENT
Start: 2023-01-07 | End: 2023-01-07 | Stop reason: HOSPADM

## 2023-01-07 RX ORDER — FLUMAZENIL 0.1 MG/ML
0.2 INJECTION, SOLUTION INTRAVENOUS
Status: CANCELLED | OUTPATIENT
Start: 2023-01-07 | End: 2023-01-07

## 2023-01-07 RX ORDER — LIDOCAINE 40 MG/G
CREAM TOPICAL
Status: DISCONTINUED | OUTPATIENT
Start: 2023-01-07 | End: 2023-01-07 | Stop reason: HOSPADM

## 2023-01-07 RX ORDER — NALOXONE HYDROCHLORIDE 0.4 MG/ML
0.4 INJECTION, SOLUTION INTRAMUSCULAR; INTRAVENOUS; SUBCUTANEOUS
Status: CANCELLED | OUTPATIENT
Start: 2023-01-07

## 2023-01-07 RX ORDER — ONDANSETRON 2 MG/ML
4 INJECTION INTRAMUSCULAR; INTRAVENOUS
Status: DISCONTINUED | OUTPATIENT
Start: 2023-01-07 | End: 2023-01-07 | Stop reason: HOSPADM

## 2023-01-07 RX ADMIN — CIPROFLOXACIN HYDROCHLORIDE 500 MG: 500 TABLET, FILM COATED ORAL at 08:15

## 2023-01-07 RX ADMIN — METRONIDAZOLE 250 MG: 250 TABLET ORAL at 19:24

## 2023-01-07 RX ADMIN — METRONIDAZOLE 250 MG: 250 TABLET ORAL at 08:15

## 2023-01-07 RX ADMIN — ACETAMINOPHEN 650 MG: 325 TABLET, FILM COATED ORAL at 18:28

## 2023-01-07 RX ADMIN — CIPROFLOXACIN HYDROCHLORIDE 500 MG: 500 TABLET, FILM COATED ORAL at 19:24

## 2023-01-07 RX ADMIN — ONDANSETRON 4 MG: 4 TABLET, ORALLY DISINTEGRATING ORAL at 18:28

## 2023-01-07 RX ADMIN — LORAZEPAM 0.5 MG: 2 INJECTION INTRAMUSCULAR; INTRAVENOUS at 22:52

## 2023-01-07 RX ADMIN — METRONIDAZOLE 250 MG: 250 TABLET ORAL at 13:00

## 2023-01-07 RX ADMIN — ACETAMINOPHEN 650 MG: 325 TABLET, FILM COATED ORAL at 06:18

## 2023-01-07 ASSESSMENT — ACTIVITIES OF DAILY LIVING (ADL)
ADLS_ACUITY_SCORE: 21
ADLS_ACUITY_SCORE: 19
ADLS_ACUITY_SCORE: 21
ADLS_ACUITY_SCORE: 19
ADLS_ACUITY_SCORE: 19
ADLS_ACUITY_SCORE: 21
ADLS_ACUITY_SCORE: 19
ADLS_ACUITY_SCORE: 21

## 2023-01-07 NOTE — PLAN OF CARE
Pt A&Ox4. VSS on RA. Tele NSR, discontinued this shift. Independent in room. HBG improving. Colonoscopy today, rectal bleeding has ceased. IV SL. Introduced regular diet, pt tolerating. Denies n/v. Denies pain. Reports overall feeling better this afternoon. Plan: likely discharge tomorrow. POC discussed w/ daughter at bedside.

## 2023-01-07 NOTE — PLAN OF CARE
Goal Outcome Evaluation:  Patient vital signs are at baseline: Yes  Patient able to ambulate as they were prior to admission or with assist devices provided by therapies during their stay:  Yes  Patient MUST void prior to discharge: Yes  Patient able to tolerate oral intake:  Yes  Pain has adequate pain control using Oral analgesics:  Yes  Does patient have an identified : Yes  Has goal D/C date and time been discussed with patient:  TBD    Pt is alert and oriented x 4, independent, VSS on RA. Denied having any pain. Pt last Hgb IS Q6 checks, last check  at 1700 was 9.4. Bowel prep has been completed and pt last 2 bowel movement was clear. Pt is NPO @ midnight.

## 2023-01-07 NOTE — PROGRESS NOTES
[facility]  Hospitalist Progress Note   01/07/2023          Assessment and Plan:         Izabella Concepcion is a 67 year old female with recent colitis, hyperlipidemia admitted on 1/5/2023 with bright red blood per rectum     Status post colonoscopy 1/7/2023   Postprocedural GI bleed likely from polypectomy site -improving.  S/p colonoscopy 1/5  Patient had colonoscopy on 1/5 with polyp removal.  Postprocedure noted BRBPR and coffee ground stools.  Associated left-sided abdominal pain.     Hgb 12.6->10.5. lactic normal.  No leukocytosis.  Electrolytes, liver enzymes within normal limits.  CT a/p in ED descending/sigmoid colitis.   Underwent repeat colonoscopy on 1/7, await final colonoscopy report.  [Per report -had cautery/clips placed at ulcer site]  [facility] following.   resume diet postprocedure and monitor hemoglobin. Appreciate comanagement.  Advance diet as tolerated.    Acute anemia likely from GI blood loss, competent of dilutional.   Baseline hemoglobin around 13.   Presented with hemoglobin of 12.6 > 8.8.  Now stable around 9-10 range.  No active bleeding.  Monitor hemoglobin level in a.m. or earlier if symptomatic.  Will transfuse for hemoglobin less than 7.     Near syncope- 2/2 blood loss and vasovagal  While waiting in ED with near syncopal/ syncopal episode x 2.   Both happened after BMs in ED. EKG sinus bradycardia.  Received fluid resuscitation.  Orthostasis negative.  Telemetry normal sinus rhythm.  -Discontinue telemetry monitoring.  Fall precautions.  Ambulate with assistance     Recent colitis  Dx 12/28 by CT. Was started on cipro/ flagyl for this for 14 days through 1/11.   Continue PTA cipro/ flagyl     Hyperlipidemia  PTA not on statin therapy.  Defer to primary care provider.    Orders Placed This Encounter      NPO per Anesthesia Guidelines for Procedure/Surgery Except for: Meds      DVT Prophylaxis: SCDs, ambulate.  Code Status: Full Code  Disposition: Expected discharge  likely tomorrow pending stable hemoglobin, tolerating oral diet.    Discussed with patient, daughter by the bedside, bedside RN, gastroenterologist  >35 minutes spent by me on the date of service doing chart review, history, exam, documentation & further activities per the note.        Emmy Mccollum MD        Interval History:        Patient lying in bed.  Reports feels foggy after colonoscopy.    Await final colonoscopy report.  [Per report -had cautery/clips placed at ulcer site]  No dizziness or vision disturbance at this time.  No chest pain at this time.  No shortness of breath.  Reports having headache, not had anything to eat since this morning.  Male ambulation, out of bed to the bathroom.  Patient's daughter by the bedside, concerns regarding transition plans       Physical Exam:        Physical Exam   Temp:  [97.8  F (36.6  C)-98.5  F (36.9  C)] 98  F (36.7  C)  Pulse:  [64-83] 64  Resp:  [15-17] 15  BP: (113-142)/(61-81) 113/61  SpO2:  [96 %-98 %] 98 %    PHYSICAL EXAM  GENERAL: Patient is in no distress. Alert and oriented.  HEENT:  Extraocular muscle movements intact  HEART: Regular rate and rhythm. S1S2. No murmurs  LUNGS: Respirations unlabored  ABDOMEN: Soft, bowel sounds heard   NEURO: Moving all extremities  EXTREMITIES: No pedal edema.  SKIN: Warm, dry. No rash   PSYCHIATRY Cooperative       Medications:          ciprofloxacin  500 mg Oral BID     metroNIDAZOLE  250 mg Oral TID     sodium chloride (PF)  3 mL Intracatheter Q8H     sodium chloride (PF)  3 mL Intracatheter Q8H     acetaminophen **OR** acetaminophen, lidocaine 4%, lidocaine 4%, lidocaine (buffered or not buffered), lidocaine (buffered or not buffered), Magic Mouthwash, melatonin, ondansetron **OR** ondansetron, ondansetron, prochlorperazine **OR** prochlorperazine **OR** prochlorperazine, sodium chloride (PF), sodium chloride (PF)         Data:      All new lab and imaging data was reviewed.

## 2023-01-07 NOTE — OR NURSING
Report called to floor nurse regarding GI bleed treatment and the patient can go on a regular diet.

## 2023-01-07 NOTE — PROGRESS NOTES
A/Ox4, VSS on RA, headache managed with PRN Tylenol, denies N/V, independent in room. BS audible, passing flatus, voiding in bathroom, no BM on my shift. NPO since midnight for colonoscopy later today.

## 2023-01-08 VITALS
DIASTOLIC BLOOD PRESSURE: 67 MMHG | HEART RATE: 70 BPM | SYSTOLIC BLOOD PRESSURE: 117 MMHG | RESPIRATION RATE: 18 BRPM | TEMPERATURE: 98.6 F | OXYGEN SATURATION: 94 %

## 2023-01-08 LAB — HGB BLD-MCNC: 10.3 G/DL (ref 11.7–15.7)

## 2023-01-08 PROCEDURE — 250N000013 HC RX MED GY IP 250 OP 250 PS 637: Performed by: INTERNAL MEDICINE

## 2023-01-08 PROCEDURE — 36415 COLL VENOUS BLD VENIPUNCTURE: CPT | Performed by: HOSPITALIST

## 2023-01-08 PROCEDURE — 85018 HEMOGLOBIN: CPT | Performed by: HOSPITALIST

## 2023-01-08 PROCEDURE — 99239 HOSP IP/OBS DSCHRG MGMT >30: CPT | Performed by: HOSPITALIST

## 2023-01-08 RX ADMIN — METRONIDAZOLE 250 MG: 250 TABLET ORAL at 13:24

## 2023-01-08 RX ADMIN — METRONIDAZOLE 250 MG: 250 TABLET ORAL at 08:30

## 2023-01-08 RX ADMIN — CIPROFLOXACIN HYDROCHLORIDE 500 MG: 500 TABLET, FILM COATED ORAL at 08:30

## 2023-01-08 ASSESSMENT — ACTIVITIES OF DAILY LIVING (ADL)
ADLS_ACUITY_SCORE: 21

## 2023-01-08 NOTE — PLAN OF CARE
Goal Outcome Evaluation:    Orientation A& X4      Vitals/Tele VSS on RA     IV Access/drains R PIV SL     Diet :Reg diet tolerating o.k, denies any nausea this shift     Mobility: Ind     GI/: Continent B&B     Discharge :Plan is to discharge home today

## 2023-01-08 NOTE — PROGRESS NOTES
Pt discharged at this time to home with family.  Family here to drive patient home.  Discharge instructions reviewed.  No new medications ordered.  No questions or concerns at this time.

## 2023-01-08 NOTE — PLAN OF CARE
Goal Outcome Evaluation:                      Summary   Colonoscopy today, rectal bleeding has ceased    [id number]    Orientation Alt & O X4     Vitals/Tele VSS on RM air , Pt no longer on tele    IV Access/drains R PIV SL    Diet Reg diet tolerated ok, had some nausea no emesis gave PRN Zofran     Mobility Ind, pt ambulated in [address] way multiple times throughout shift     GI/ Continent     Discharge Plan Home tomorrow     Pain pt complained of headache gave ice pack, encouraged fluids and PRN tylenol with some relief pt stated she was a little anxious and thought she might still have a bleed, however no signs of bleeding. Gave 1 time dose of Ativan.     See Flow sheets for assessment

## 2023-01-09 ENCOUNTER — TELEPHONE (OUTPATIENT)
Dept: FAMILY MEDICINE | Facility: CLINIC | Age: 68
End: 2023-01-09

## 2023-01-09 ENCOUNTER — NURSE TRIAGE (OUTPATIENT)
Dept: FAMILY MEDICINE | Facility: CLINIC | Age: 68
End: 2023-01-09

## 2023-01-09 LAB
PATH REPORT.COMMENTS IMP SPEC: NORMAL
PATH REPORT.COMMENTS IMP SPEC: NORMAL
PATH REPORT.FINAL DX SPEC: NORMAL
PATH REPORT.GROSS SPEC: NORMAL
PATH REPORT.MICROSCOPIC SPEC OTHER STN: NORMAL
PATH REPORT.RELEVANT HX SPEC: NORMAL
PHOTO IMAGE: NORMAL

## 2023-01-09 PROCEDURE — 88305 TISSUE EXAM BY PATHOLOGIST: CPT | Mod: 26 | Performed by: PATHOLOGY

## 2023-01-09 NOTE — TELEPHONE ENCOUNTER
"Nurse Triage SBAR    Is this a 2nd Level Triage? YES, LICENSED PRACTITIONER REVIEW IS REQUIRED    Situation: Patient calling clinic reporting \"abdominal fullness with pressure in the abdomen and chest\". Patient is also reporting constipation as well as the feeling like \"food is just sitting in me, it does not feel like anything is moving\".       Background: Patient was recently discharged from ED 1/8/2023 after having rectal bleeding and syncopal episodes in the ED. Patient stated she \"filled multiple toilets\" with blood while in the hospital and is feeling like how she felt before she went to the ED.     Assessment: Patient does not report having a BM since ED discharge. Patient is not having chest pain or SOB. Patient stated the chest pressure she is feeling is \"more internal, it feels like things aren't moving after I ate\". Patient does not have any current rectal bleeding or dizziness. Patient stated she has been eating oatmeal and drinking broth and it feels like it's just sitting in her stomach and not moving. Patient stated she felt like this when she had her bleeding.      Protocol Recommended Disposition:   See in Office Today    Recommendation: Per disposition, RN advised patient to be seen today. Due to time of day, RN advised patient to return to the ED for evaluation. Patient refused to go without PCP recommendation. Patient stated, \"They're just too busy there, I don't want to go back and wait for four hours.\"     Routed to provider.     Does the patient meet one of the following criteria for ADS visit consideration? 16+ years old, with an [facility] PCP     TIP  Providers, please consider if this condition is appropriate for management at one of our Acute and Diagnostic Services sites.     If patient is a good candidate, please use dotphrase <dot>triageresponse and select Refer to ADS to document.      Reason for Disposition    Abdomen is more swollen than usual    Additional Information    Negative: " Abdomen pain is main symptom and male    Negative: Abdomen pain is main symptom and female    Negative: Rectal bleeding or blood in stool is main symptom    Negative: Vomiting bile (green color)    Negative: Patient sounds very sick or weak to the triager    Negative: Constant abdominal pain lasting > 2 hours    Negative: Vomiting and abdomen looks much more swollen than usual    Negative: Rectal pain or fullness from fecal impaction (rectum full of stool) and NOT better after SITZ bath, suppository or enema    Protocols used: CONSTIPATION-A-OH

## 2023-01-09 NOTE — TELEPHONE ENCOUNTER
"To PCP  JOSEF    Called patient regarding MD message below.  Patient states, \"I  Have my boots on and my son will be picking me up to take me to [place]. I will keep you posted. I have merged the charts so you should be able to see my results.\"    Coty Beard RN on 1/9/2023 at 5:42 PM    "

## 2023-01-09 NOTE — TELEPHONE ENCOUNTER
"Hospital/TCU/ED for chronic condition Discharge Protocol    \"Hi, my name is Fe Carpio RN, a registered nurse, and I am calling from [facility].  I am calling to follow up and see how things are going for you after your recent emergency visit/hospital/TCU stay.\"    Tell me how you are doing now that you are home?\" Still tired      Discharge Instructions    \"Let's review your discharge instructions.  What is/are the follow-up recommendations?  Pt. Response: Follow up within 1 week    \"Has an appointment with your primary care provider been scheduled?\"   No (schedule appointment)    \"When you see the provider, I would recommend that you bring your medications with you.\"    Medications    \"Tell me what changed about your medicines when you discharged?\"    Changes to chronic meds?    0-1    \"What questions do you have about your medications?\"    None     New diagnoses of heart failure, COPD, diabetes, or MI?    No              Post Discharge Medication Reconciliation Status: discharge medications reconciled, continue medications without change.    Was MTM referral placed (*Make sure to put transitions as reason for referral)?   No    Call Summary    \"What questions or concerns do you have about your recent visit and your follow-up care?\"     none    \"If you have questions or things don't continue to improve, we encourage you contact us through the main clinic number (give number).  Even if the clinic is not open, triage nurses are available 24/7 to help you.     We would like you to know that our clinic has extended hours (provide information).  We also have urgent care (provide details on closest location and hours/contact info)\"      \"Thank you for your time and take care!\"           .  "

## 2023-01-09 NOTE — TELEPHONE ENCOUNTER
Due to my concern for partial bowel obstruction and given context of recent hospitalization, recommend she return to ER today.

## 2023-01-10 ENCOUNTER — TRANSFERRED RECORDS (OUTPATIENT)
Dept: HEALTH INFORMATION MANAGEMENT | Facility: CLINIC | Age: 68
End: 2023-01-10
Payer: MEDICARE

## 2023-01-10 ENCOUNTER — PATIENT OUTREACH (OUTPATIENT)
Dept: CARE COORDINATION | Facility: CLINIC | Age: 68
End: 2023-01-10

## 2023-01-10 NOTE — PROGRESS NOTES
[facility]    Background: Transitional Care Management program identified per system criteria and reviewed by [facility] team for possible outreach.    Assessment:  Upon chart review, patient is in communication with a clinic team member, nurse or provider within [facility] for reason of discussing hospital follow up plan of care and answering questions patient may have related to discharge instructions. [facility] Team member will update episode status of Transitional Care Management program to enrolled per system workflows.     [facility] made first outreach attempt on 1/9/23 to reach patient for hospital follow up and left message and that time. The patient talked to a RN, the CHW will create an episode for Primary Care CCC.    Plan: [facility] will make no additional outreach attempts to minimize duplicative efforts and reduce potential confusion for patient.      MEKHI Carbone  [facility], [facility]    *[facility] Resource Team does NOT follow patient ongoing. Referrals are identified based on internal discharge reports and the outreach is to ensure patient has an understanding of their discharge instructions.

## 2023-01-10 NOTE — PROGRESS NOTES
Clinic Care Coordination Contact  [facility]/Voicemail    Referral Source: PCP  Clinical Data: Care Coordinator Outreach  Outreach attempted x 1.  Left message on patient's voicemail with call back information and requested return call.    Plan: Care Coordinator will try to reach patient again in 1-2 business days.    MEKHI Goss  Clinic Care Coordination  [facility] Clinics: [place], [place], [facility], and [place] [facility]  Phone: [phone]

## 2023-01-11 NOTE — PROGRESS NOTES
"Clinic Care Coordination Contact  Community Health Worker Initial Outreach    CHW introduced self and offered the CC program.    CHW Initial Information Gathering:  Referral Source: IP Handoff  Preferred Hospital: [facility]  [phone]  Current living arrangement:: I live alone  Type of residence:: Other (Condo)  Community Resources: None  Supplies Currently Used at Home: None  Equipment Currently Used at Home: none  Informal Support system:: Children, Family, Friends  No PCP office visit in Past Year: No  Transportation means:: Regular car  CHW Additional Questions  If ED/Hospital discharge, follow-up appointment scheduled as recommended?: N/A  MyChart active?: Yes  Patient sent Social Determinants of Health questionnaire?: Yes    Discussed:    Patient stated she is still bleeding from her colonoscopy.  Patient stated she does not feel like she is getting any better.    Patient stated she does not \"feel\" that her health issues are being addressed, and she is \"concerned\".    Patient stated she would like \"consistent monitoring\".    Patient stated she may need transportation to the [place].    Patient accepts CC: Yes. Patient scheduled for assessment with EFRA Bautista RN on 1/12/23 at 1:00pm. Patient noted desire to discuss patient's health and CC support.     MEKHI Goss  Clinic Care Coordination  [facility] Clinics: [place], [place], [place], and [place] [facility]  Phone: [phone]  "

## 2023-01-16 ENCOUNTER — PATIENT OUTREACH (OUTPATIENT)
Dept: NURSING | Facility: CLINIC | Age: 68
End: 2023-01-16
Payer: MEDICARE

## 2023-01-16 ASSESSMENT — ACTIVITIES OF DAILY LIVING (ADL): DEPENDENT_IADLS:: INDEPENDENT

## 2023-01-16 NOTE — PROGRESS NOTES
"Clinic Care Coordination Contact    Follow Up Progress Note      Assessment:   Patient states she feels so much better today and \"turned a corner for the better\".  Patient did go to the [facility] last week (1/12/2023) and they ran \"blood tests\" and they did not complete a CT as she had one at [facility] on 1/9/2023 when she was in the ER.    Per patient, her \"numbers\" are trending up and the ER Provider instructed patient to \"take it easy\" and \"slow down\".  Per patient, the bleeding is coming from \"my lower intestine and it's residual and not new\".  Patient was happy to hear and today she states her stool is still dark but is getting \"lighter\" and her \"coffee ground\" stool has greatly lessened.  Patient states she feels her circulatory system is finally moving.  Per patient, she took yoga which focuses on \"circulation\" and patient reports, \"I feel so much better\".  Patient verbalized her understanding of slowing down due to \"the walk from my car to the office\" today really exhausted her and she is going to slow down even more.    Care Gaps:    Health Maintenance Due   Topic Date Due     ASTHMA ACTION PLAN  Never done     COVID-19 Vaccine (1) Never done     Pneumococcal Vaccine: 65+ Years (1 - PCV) Never done     HEPATITIS C SCREENING  Never done     ZOSTER IMMUNIZATION (1 of 2) Never done     LUNG CANCER SCREENING  Never done     INFLUENZA VACCINE (1) 09/01/2022     PHQ-2 (once per calendar year)  01/01/2023       Care Gaps Last addressed on 1/16/2023    Care Plans  Care Plan: Prevent re-hospitalizations and Emergency Room visits     Problem: Lifestyle choices     Goal: Prevent re-hospitalizations and ER visits     Start Date: 1/16/2023 Expected End Date: 4/14/2023    Note:     Barriers: Recent discharge from hospital  Strengths: Strong advocate for self  Patient expressed understanding of goal: Yes  Action steps to achieve this goal:  1. I will utilize the Urgent Care at [place] or [facility]  2. I will contact my " care team with questions, concerns, support needs   3. I will use the clinic as a resource, and I understand I can contact my clinic with 24/7 after hours services available                      Intervention/Education provided during outreach:   RNCC called and spoke with patient; introduced self, discussed role of Care Coordination and explained reason for call    Plan:   -Patient will contact the care team with questions, concerns, support needs   -Patient will use the clinic as a resource and understands (s)he can contact the [facility] with 24/7 after hours services available  -Care Coordinator will remain available as needed  -RNCC will follow up in one month for follow up appointments/recommendations and goal progression     Rosi Lane RN, BSN, PHN  Primary Care / Care Coordinator   [facility]  E-mail [email or web address]   [phone]

## 2023-01-20 ENCOUNTER — TELEPHONE (OUTPATIENT)
Dept: FAMILY MEDICINE | Facility: CLINIC | Age: 68
End: 2023-01-20
Payer: MEDICARE

## 2023-01-20 NOTE — TELEPHONE ENCOUNTER
Spoke with patient and relayed message from KIM Galdamez. Patient will keep Appt with Dr. Doran on 1-.    Sabra Medrano MA

## 2023-01-24 ENCOUNTER — OFFICE VISIT (OUTPATIENT)
Dept: FAMILY MEDICINE | Facility: CLINIC | Age: 68
End: 2023-01-24
Payer: MEDICARE

## 2023-01-24 VITALS
OXYGEN SATURATION: 98 % | HEIGHT: 63 IN | TEMPERATURE: 98.1 F | WEIGHT: 151 LBS | DIASTOLIC BLOOD PRESSURE: 74 MMHG | SYSTOLIC BLOOD PRESSURE: 136 MMHG | RESPIRATION RATE: 16 BRPM | BODY MASS INDEX: 26.75 KG/M2 | HEART RATE: 75 BPM

## 2023-01-24 DIAGNOSIS — R30.0 DYSURIA: ICD-10-CM

## 2023-01-24 DIAGNOSIS — K52.9 COLITIS: ICD-10-CM

## 2023-01-24 DIAGNOSIS — K92.1 MELENA: Primary | ICD-10-CM

## 2023-01-24 LAB
ALBUMIN UR-MCNC: NEGATIVE MG/DL
APPEARANCE UR: CLEAR
BILIRUB UR QL STRIP: NEGATIVE
COLOR UR AUTO: YELLOW
GLUCOSE UR STRIP-MCNC: NEGATIVE MG/DL
HCT VFR BLD AUTO: 39.2 % (ref 35–47)
HGB BLD-MCNC: 12.5 G/DL (ref 11.7–15.7)
HGB UR QL STRIP: NEGATIVE
KETONES UR STRIP-MCNC: NEGATIVE MG/DL
LEUKOCYTE ESTERASE UR QL STRIP: ABNORMAL
NITRATE UR QL: NEGATIVE
PH UR STRIP: 6 [PH] (ref 5–7)
RBC #/AREA URNS AUTO: NORMAL /HPF
SP GR UR STRIP: 1.02 (ref 1–1.03)
UROBILINOGEN UR STRIP-ACNC: 0.2 E.U./DL
WBC #/AREA URNS AUTO: NORMAL /HPF

## 2023-01-24 PROCEDURE — 85014 HEMATOCRIT: CPT | Performed by: INTERNAL MEDICINE

## 2023-01-24 PROCEDURE — 85018 HEMOGLOBIN: CPT | Performed by: INTERNAL MEDICINE

## 2023-01-24 PROCEDURE — 99214 OFFICE O/P EST MOD 30 MIN: CPT | Performed by: INTERNAL MEDICINE

## 2023-01-24 PROCEDURE — 81001 URINALYSIS AUTO W/SCOPE: CPT | Performed by: INTERNAL MEDICINE

## 2023-01-24 PROCEDURE — 36415 COLL VENOUS BLD VENIPUNCTURE: CPT | Performed by: INTERNAL MEDICINE

## 2023-01-24 RX ORDER — CIPROFLOXACIN 500 MG/1
TABLET, FILM COATED ORAL
COMMUNITY
Start: 2022-12-26 | End: 2023-01-24

## 2023-01-24 RX ORDER — METRONIDAZOLE 500 MG/1
TABLET ORAL
COMMUNITY
Start: 2022-12-27 | End: 2023-01-24

## 2023-01-24 ASSESSMENT — PAIN SCALES - GENERAL: PAINLEVEL: NO PAIN (0)

## 2023-01-24 NOTE — PROGRESS NOTES
"  Assessment & Plan     Melena  Colitis  Improving s/p ab though low appetite persists  Recheck HH  Recommend she follow-up with local GI for surveillance/recheck  Referral to [facility] provided. She will call to schedule  Recommend bland/BRAT diet  ER if recurrent bleeding, vomiting, fevers, or significant pain develop  - Hemoglobin and hematocrit  - Adult GI  Referral - Consult Only    Dysuria  - UA Macro with Reflex to Micro and Culture - lab collect      Return in about 3 months (around 4/24/2023) for Follow up, with me, in person, sooner if symptoms worsen or do not improve.    Shawanda Doran DO  [facility] CASSANDRA Parekh is a 67 year old, presenting for the following health issues:  ER F/U      HPI     ED/UC Followup:    Facility:  [facility] Emergency Department  Date of visit: 01/12/23  Reason for visit: Melena  Current Status: Feeling bloated, not much of an appetite    Izabella presents for hospital follow-up for melena/colitis. She was treated with ab. Had repeat cscope. She reports has not yet followed up with GI specialist for recheck, would like alternative GI option as not satisfied with previous specialist. She reports melena has resolved. Was bloated and constipated, took miralax and helped relieve the constipation. No bleeding. She denies significant abd pain or fevers but does have bloating and low appetite. No vomiting, occasional nausea. No f/c.     Review of Systems   Constitutional, HEENT, cardiovascular, pulmonary, gi and gu systems are negative, except as otherwise noted.      Objective    /74 (BP Location: Right arm, Patient Position: Sitting, Cuff Size: Adult Regular)   Pulse 75   Temp 98.1  F (36.7  C)   Resp 16   Ht 1.6 m (5' 3\")   Wt 68.5 kg (151 lb)   SpO2 98%   BMI 26.75 kg/m    Body mass index is 26.75 kg/m .  Physical Exam       GENERAL APPEARANCE: AAOx3, no distress. Well developed.    ABDOMEN:  soft, nontender, No rebound or " guarding.     PSYCH: appropriate mood and affect.

## 2023-02-03 ENCOUNTER — TRANSFERRED RECORDS (OUTPATIENT)
Dept: HEALTH INFORMATION MANAGEMENT | Facility: CLINIC | Age: 68
End: 2023-02-03
Payer: MEDICARE

## 2023-02-03 LAB — TSH SERPL-ACNC: 0.53 UIU/ML (ref 0.45–4.5)

## 2023-02-05 ENCOUNTER — TRANSFERRED RECORDS (OUTPATIENT)
Dept: HEALTH INFORMATION MANAGEMENT | Facility: CLINIC | Age: 68
End: 2023-02-05
Payer: MEDICARE

## 2023-02-07 ENCOUNTER — TRANSFERRED RECORDS (OUTPATIENT)
Dept: HEALTH INFORMATION MANAGEMENT | Facility: CLINIC | Age: 68
End: 2023-02-07
Payer: MEDICARE

## 2023-02-13 ENCOUNTER — PATIENT OUTREACH (OUTPATIENT)
Dept: NURSING | Facility: CLINIC | Age: 68
End: 2023-02-13
Payer: MEDICARE

## 2023-02-13 ASSESSMENT — ACTIVITIES OF DAILY LIVING (ADL): DEPENDENT_IADLS:: INDEPENDENT

## 2023-02-13 NOTE — PROGRESS NOTES
"Clinic Care Coordination Contact    Follow Up Progress Note      Assessment:   Patient states that she is unable to speak for very long as she is headed into a meeting.  Patient states she is \"finally feeling energy\" and the rectal bleeding has stopped.  Patient reports she saw [facility] where an endoscopy was completed as polyps were found.  Per patient, she is waiting for the biopsy results to see if she has H. Pylori or Celiac's Disease.    Patient reports her weight is 141 pounds where last month she weighed approximately 155 pounds.  Patient states she could stand to lose a \"few more\" pounds  Patient add, \"they also found GERD\" at [facility]. Patient states yogurt and dairy products make her nauseas and she has been avoiding dairy.  She is able to comply with BRAT diet without nausea.    Patient states she is at \"70%\" of her normal functioning and is happy to have more energy.  Patient needed to attend a meeting and politely ended the conversation,.     Care Gaps:    Health Maintenance Due   Topic Date Due     ASTHMA ACTION PLAN  Never done     COVID-19 Vaccine (1) Never done     Pneumococcal Vaccine: 65+ Years (1 - PCV) Never done     HEPATITIS C SCREENING  Never done     ZOSTER IMMUNIZATION (1 of 2) Never done     LUNG CANCER SCREENING  Never done     INFLUENZA VACCINE (1) 09/01/2022     Scheduled for next [facility] outreach      Care Plans  Care Plan: Prevent re-hospitalizations and Emergency Room visits     Problem: Lifestyle choices     Goal: Prevent re-hospitalizations and ER visits     Start Date: 1/16/2023 Expected End Date: 4/14/2023    Note:     Barriers: Recent discharge from hospital  Strengths: Strong advocate for self  Patient expressed understanding of goal: Yes  Action steps to achieve this goal:  1. I will utilize the Urgent Care at [place] or [facility]  2. I will contact my care team with questions, concerns, support needs   3. I will use the clinic as a resource, and I understand I can contact my " clinic with 24/7 after hours services available                      Intervention/Education provided during outreach:   RNCC called and spoke with patient/caregiver; introduced self, discussed role of Care Coordination and explained reason for call    Plan:   -Patient will contact the care team with questions, concerns, support needs   -Patient will use the clinic as a resource and understands (s)he can contact the [facility] with 24/7 after hours services available  -Care Coordinator will remain available as needed  -RNCC will follow up in one month  for follow up appointments/recommendations and goal progression     Rosi Lane RN, BSN, PHN  Primary Care / Care Coordinator   [facility]  E-mail [email or web address]   [phone]

## 2023-03-09 ENCOUNTER — TRANSFERRED RECORDS (OUTPATIENT)
Dept: HEALTH INFORMATION MANAGEMENT | Facility: CLINIC | Age: 68
End: 2023-03-09
Payer: MEDICARE

## 2023-03-13 ENCOUNTER — PATIENT OUTREACH (OUTPATIENT)
Dept: CARE COORDINATION | Facility: CLINIC | Age: 68
End: 2023-03-13
Payer: MEDICARE

## 2023-03-13 ASSESSMENT — ACTIVITIES OF DAILY LIVING (ADL): DEPENDENT_IADLS:: INDEPENDENT

## 2023-03-13 NOTE — PROGRESS NOTES
Clinic Care Coordination Contact  [facility]/Voicemail    Referral Source: Care Team  Clinical Data: Care Coordinator Outreach  Outreach attempted x 1.  Left message on patient's voicemail with call back information and requested return call.    Plan: Care Coordinator will try to reach patient again in 1 month.     Rosi Lane RN, BSN, PHN  Primary Care / Care Coordinator   [facility]  [email or web address] [email or web address]   [phone]

## 2023-03-16 ENCOUNTER — PATIENT OUTREACH (OUTPATIENT)
Dept: CARE COORDINATION | Facility: CLINIC | Age: 68
End: 2023-03-16
Payer: MEDICARE

## 2023-03-16 ENCOUNTER — TRANSFERRED RECORDS (OUTPATIENT)
Dept: HEALTH INFORMATION MANAGEMENT | Facility: CLINIC | Age: 68
End: 2023-03-16
Payer: MEDICARE

## 2023-03-16 NOTE — PROGRESS NOTES
Clinic Care Coordination Contact    Patient called requesting phone number for Medical Records.    Contact information provided to patient.    MEKHI Goss  Clinic Care Coordination  [facility] Clinics: [place], and [place] for Women  Phone: [phone]

## 2023-03-23 ENCOUNTER — TRANSFERRED RECORDS (OUTPATIENT)
Dept: HEALTH INFORMATION MANAGEMENT | Facility: CLINIC | Age: 68
End: 2023-03-23
Payer: MEDICARE

## 2023-03-23 LAB
ALT SERPL-CCNC: 7 IU/L (ref 0–32)
AST SERPL-CCNC: 19 IU/L (ref 0–40)
CREATININE (EXTERNAL): 0.65 MG/DL (ref 0.57–1)
GFR ESTIMATED (EXTERNAL): 95 ML/MIN/1.73
GLUCOSE (EXTERNAL): 84 MG/DL (ref 70–99)
POTASSIUM (EXTERNAL): 4 MMOL/L (ref 3.5–5.2)

## 2023-03-31 ENCOUNTER — APPOINTMENT (OUTPATIENT)
Dept: MRI IMAGING | Facility: CLINIC | Age: 68
DRG: 947 | End: 2023-03-31
Attending: EMERGENCY MEDICINE
Payer: MEDICARE

## 2023-03-31 ENCOUNTER — APPOINTMENT (OUTPATIENT)
Dept: ULTRASOUND IMAGING | Facility: CLINIC | Age: 68
DRG: 947 | End: 2023-03-31
Attending: INTERNAL MEDICINE
Payer: MEDICARE

## 2023-03-31 ENCOUNTER — APPOINTMENT (OUTPATIENT)
Dept: GENERAL RADIOLOGY | Facility: CLINIC | Age: 68
DRG: 947 | End: 2023-03-31
Attending: EMERGENCY MEDICINE
Payer: MEDICARE

## 2023-03-31 ENCOUNTER — HOSPITAL ENCOUNTER (INPATIENT)
Facility: CLINIC | Age: 68
LOS: 3 days | Discharge: HOME | DRG: 947 | End: 2023-04-03
Attending: EMERGENCY MEDICINE | Admitting: INTERNAL MEDICINE
Payer: MEDICARE

## 2023-03-31 ENCOUNTER — NURSE TRIAGE (OUTPATIENT)
Dept: FAMILY MEDICINE | Facility: CLINIC | Age: 68
End: 2023-03-31

## 2023-03-31 ENCOUNTER — APPOINTMENT (OUTPATIENT)
Dept: CT IMAGING | Facility: CLINIC | Age: 68
DRG: 947 | End: 2023-03-31
Attending: EMERGENCY MEDICINE
Payer: MEDICARE

## 2023-03-31 DIAGNOSIS — R90.89 LEPTOMENINGEAL ENHANCEMENT ON MRI OF BRAIN: ICD-10-CM

## 2023-03-31 DIAGNOSIS — R29.898 WEAKNESS OF RIGHT UPPER EXTREMITY: ICD-10-CM

## 2023-03-31 DIAGNOSIS — R47.89 WORD FINDING DIFFICULTY: ICD-10-CM

## 2023-03-31 DIAGNOSIS — H53.451 PERIPHERAL VISUAL FIELD DEFECT, RIGHT: ICD-10-CM

## 2023-03-31 DIAGNOSIS — R20.0 RIGHT UPPER EXTREMITY NUMBNESS: ICD-10-CM

## 2023-03-31 LAB
ANION GAP SERPL CALCULATED.3IONS-SCNC: 13 MMOL/L (ref 7–15)
APPEARANCE CSF: CLEAR
APTT PPP: 25 SECONDS (ref 22–38)
ATRIAL RATE - MUSE: 72 BPM
BASOPHILS # BLD AUTO: 0.1 10E3/UL (ref 0–0.2)
BASOPHILS NFR BLD AUTO: 1 %
BUN SERPL-MCNC: 9.1 MG/DL (ref 8–23)
CALCIUM SERPL-MCNC: 10.1 MG/DL (ref 8.8–10.2)
CHLORIDE SERPL-SCNC: 104 MMOL/L (ref 98–107)
CHOLEST SERPL-MCNC: 276 MG/DL
COLOR CSF: COLORLESS
CREAT SERPL-MCNC: 0.72 MG/DL (ref 0.51–0.95)
DEPRECATED HCO3 PLAS-SCNC: 23 MMOL/L (ref 22–29)
DIASTOLIC BLOOD PRESSURE - MUSE: NORMAL MMHG
EOSINOPHIL # BLD AUTO: 0.1 10E3/UL (ref 0–0.7)
EOSINOPHIL NFR BLD AUTO: 1 %
ERYTHROCYTE [DISTWIDTH] IN BLOOD BY AUTOMATED COUNT: 12.3 % (ref 10–15)
GFR SERPL CREATININE-BSD FRML MDRD: 90 ML/MIN/1.73M2
GLUCOSE BLDC GLUCOMTR-MCNC: 93 MG/DL (ref 70–99)
GLUCOSE CSF-MCNC: 61 MG/DL (ref 40–70)
GLUCOSE SERPL-MCNC: 136 MG/DL (ref 70–99)
HBA1C MFR BLD: 5.5 %
HCT VFR BLD AUTO: 46.6 % (ref 35–47)
HDLC SERPL-MCNC: 66 MG/DL
HGB BLD-MCNC: 15.1 G/DL (ref 11.7–15.7)
HOLD SPECIMEN: NORMAL
IMM GRANULOCYTES # BLD: 0 10E3/UL
IMM GRANULOCYTES NFR BLD: 0 %
INR PPP: 0.97 (ref 0.85–1.15)
INTERPRETATION ECG - MUSE: NORMAL
LDLC SERPL CALC-MCNC: 189 MG/DL
LYMPHOCYTES # BLD AUTO: 2.6 10E3/UL (ref 0.8–5.3)
LYMPHOCYTES NFR BLD AUTO: 31 %
MCH RBC QN AUTO: 27.5 PG (ref 26.5–33)
MCHC RBC AUTO-ENTMCNC: 32.4 G/DL (ref 31.5–36.5)
MCV RBC AUTO: 85 FL (ref 78–100)
MONOCYTES # BLD AUTO: 0.6 10E3/UL (ref 0–1.3)
MONOCYTES NFR BLD AUTO: 7 %
NEUTROPHILS # BLD AUTO: 4.8 10E3/UL (ref 1.6–8.3)
NEUTROPHILS NFR BLD AUTO: 60 %
NONHDLC SERPL-MCNC: 210 MG/DL
NRBC # BLD AUTO: 0 10E3/UL
NRBC BLD AUTO-RTO: 0 /100
P AXIS - MUSE: 64 DEGREES
PERFORMING LABORATORY: NORMAL
PLATELET # BLD AUTO: 344 10E3/UL (ref 150–450)
POTASSIUM SERPL-SCNC: 3.7 MMOL/L (ref 3.4–5.3)
PR INTERVAL - MUSE: 162 MS
PROT CSF-MCNC: 60.3 MG/DL (ref 15–45)
QRS DURATION - MUSE: 92 MS
QT - MUSE: 400 MS
QTC - MUSE: 438 MS
R AXIS - MUSE: -41 DEGREES
RBC # BLD AUTO: 5.5 10E6/UL (ref 3.8–5.2)
RBC # CSF MANUAL: 1 /UL (ref 0–2)
SODIUM SERPL-SCNC: 140 MMOL/L (ref 136–145)
SYSTOLIC BLOOD PRESSURE - MUSE: NORMAL MMHG
T AXIS - MUSE: 45 DEGREES
TEST NAME: NORMAL
TRIGL SERPL-MCNC: 105 MG/DL
TROPONIN T SERPL HS-MCNC: 8 NG/L
TUBE # CSF: 4
VENTRICULAR RATE- MUSE: 72 BPM
WBC # BLD AUTO: 8.1 10E3/UL (ref 4–11)
WBC # CSF MANUAL: 0 /UL (ref 0–5)

## 2023-03-31 PROCEDURE — 70450 CT HEAD/BRAIN W/O DYE: CPT | Mod: MA

## 2023-03-31 PROCEDURE — 84157 ASSAY OF PROTEIN OTHER: CPT | Performed by: EMERGENCY MEDICINE

## 2023-03-31 PROCEDURE — 96374 THER/PROPH/DIAG INJ IV PUSH: CPT | Mod: 59

## 2023-03-31 PROCEDURE — 93005 ELECTROCARDIOGRAM TRACING: CPT

## 2023-03-31 PROCEDURE — 80048 BASIC METABOLIC PNL TOTAL CA: CPT | Performed by: EMERGENCY MEDICINE

## 2023-03-31 PROCEDURE — 82784 ASSAY IGA/IGD/IGG/IGM EACH: CPT | Performed by: PSYCHIATRY & NEUROLOGY

## 2023-03-31 PROCEDURE — 83036 HEMOGLOBIN GLYCOSYLATED A1C: CPT | Performed by: INTERNAL MEDICINE

## 2023-03-31 PROCEDURE — 99285 EMERGENCY DEPT VISIT HI MDM: CPT | Mod: 25

## 2023-03-31 PROCEDURE — 62328 DX LMBR SPI PNXR W/FLUOR/CT: CPT

## 2023-03-31 PROCEDURE — 84484 ASSAY OF TROPONIN QUANT: CPT | Performed by: EMERGENCY MEDICINE

## 2023-03-31 PROCEDURE — 120N000001 HC R&B MED SURG/OB

## 2023-03-31 PROCEDURE — 85610 PROTHROMBIN TIME: CPT | Performed by: EMERGENCY MEDICINE

## 2023-03-31 PROCEDURE — 70498 CT ANGIOGRAPHY NECK: CPT | Mod: MA

## 2023-03-31 PROCEDURE — 250N000011 HC RX IP 250 OP 636: Performed by: INTERNAL MEDICINE

## 2023-03-31 PROCEDURE — 89050 BODY FLUID CELL COUNT: CPT | Performed by: EMERGENCY MEDICINE

## 2023-03-31 PROCEDURE — 87483 CNS DNA AMP PROBE TYPE 12-25: CPT | Performed by: PSYCHIATRY & NEUROLOGY

## 2023-03-31 PROCEDURE — 80061 LIPID PANEL: CPT | Performed by: INTERNAL MEDICINE

## 2023-03-31 PROCEDURE — 250N000013 HC RX MED GY IP 250 OP 250 PS 637: Performed by: EMERGENCY MEDICINE

## 2023-03-31 PROCEDURE — 255N000002 HC RX 255 OP 636: Performed by: EMERGENCY MEDICINE

## 2023-03-31 PROCEDURE — 36415 COLL VENOUS BLD VENIPUNCTURE: CPT | Performed by: EMERGENCY MEDICINE

## 2023-03-31 PROCEDURE — 86341 ISLET CELL ANTIBODY: CPT | Performed by: PSYCHIATRY & NEUROLOGY

## 2023-03-31 PROCEDURE — 88108 CYTOPATH CONCENTRATE TECH: CPT | Mod: TC | Performed by: PSYCHIATRY & NEUROLOGY

## 2023-03-31 PROCEDURE — 87529 HSV DNA AMP PROBE: CPT | Performed by: EMERGENCY MEDICINE

## 2023-03-31 PROCEDURE — 87205 SMEAR GRAM STAIN: CPT | Performed by: EMERGENCY MEDICINE

## 2023-03-31 PROCEDURE — 70553 MRI BRAIN STEM W/O & W/DYE: CPT | Mod: MA

## 2023-03-31 PROCEDURE — A9585 GADOBUTROL INJECTION: HCPCS | Performed by: EMERGENCY MEDICINE

## 2023-03-31 PROCEDURE — 99222 1ST HOSP IP/OBS MODERATE 55: CPT | Mod: A1 | Performed by: INTERNAL MEDICINE

## 2023-03-31 PROCEDURE — 70496 CT ANGIOGRAPHY HEAD: CPT | Mod: MA

## 2023-03-31 PROCEDURE — 009U3ZX DRAINAGE OF SPINAL CANAL, PERCUTANEOUS APPROACH, DIAGNOSTIC: ICD-10-PCS | Performed by: RADIOLOGY

## 2023-03-31 PROCEDURE — 250N000009 HC RX 250: Performed by: EMERGENCY MEDICINE

## 2023-03-31 PROCEDURE — 99222 1ST HOSP IP/OBS MODERATE 55: CPT | Performed by: NURSE PRACTITIONER

## 2023-03-31 PROCEDURE — 250N000011 HC RX IP 250 OP 636: Performed by: EMERGENCY MEDICINE

## 2023-03-31 PROCEDURE — 36415 COLL VENOUS BLD VENIPUNCTURE: CPT | Performed by: PSYCHIATRY & NEUROLOGY

## 2023-03-31 PROCEDURE — 82945 GLUCOSE OTHER FLUID: CPT | Performed by: EMERGENCY MEDICINE

## 2023-03-31 PROCEDURE — 93880 EXTRACRANIAL BILAT STUDY: CPT

## 2023-03-31 PROCEDURE — 87015 SPECIMEN INFECT AGNT CONCNTJ: CPT | Performed by: EMERGENCY MEDICINE

## 2023-03-31 PROCEDURE — 85730 THROMBOPLASTIN TIME PARTIAL: CPT | Performed by: EMERGENCY MEDICINE

## 2023-03-31 PROCEDURE — 85025 COMPLETE CBC W/AUTO DIFF WBC: CPT | Performed by: EMERGENCY MEDICINE

## 2023-03-31 RX ORDER — IOPAMIDOL 755 MG/ML
75 INJECTION, SOLUTION INTRAVASCULAR ONCE
Status: COMPLETED | OUTPATIENT
Start: 2023-03-31 | End: 2023-03-31

## 2023-03-31 RX ORDER — LABETALOL HYDROCHLORIDE 5 MG/ML
10-20 INJECTION, SOLUTION INTRAVENOUS EVERY 10 MIN PRN
Status: DISCONTINUED | OUTPATIENT
Start: 2023-03-31 | End: 2023-04-01

## 2023-03-31 RX ORDER — ONDANSETRON 2 MG/ML
4 INJECTION INTRAMUSCULAR; INTRAVENOUS ONCE
Status: COMPLETED | OUTPATIENT
Start: 2023-03-31 | End: 2023-03-31

## 2023-03-31 RX ORDER — ACETAMINOPHEN 325 MG/1
650 TABLET ORAL EVERY 4 HOURS PRN
Status: DISCONTINUED | OUTPATIENT
Start: 2023-03-31 | End: 2023-04-03 | Stop reason: HOSPADM

## 2023-03-31 RX ORDER — NALOXONE HYDROCHLORIDE 0.4 MG/ML
0.4 INJECTION, SOLUTION INTRAMUSCULAR; INTRAVENOUS; SUBCUTANEOUS
Status: DISCONTINUED | OUTPATIENT
Start: 2023-03-31 | End: 2023-03-31

## 2023-03-31 RX ORDER — PANTOPRAZOLE SODIUM 40 MG/1
40 TABLET, DELAYED RELEASE ORAL DAILY PRN
Status: DISCONTINUED | OUTPATIENT
Start: 2023-03-31 | End: 2023-04-03 | Stop reason: HOSPADM

## 2023-03-31 RX ORDER — FLUMAZENIL 0.1 MG/ML
0.2 INJECTION, SOLUTION INTRAVENOUS
Status: DISCONTINUED | OUTPATIENT
Start: 2023-03-31 | End: 2023-03-31

## 2023-03-31 RX ORDER — ACETAMINOPHEN 325 MG/1
975 TABLET ORAL ONCE
Status: COMPLETED | OUTPATIENT
Start: 2023-03-31 | End: 2023-03-31

## 2023-03-31 RX ORDER — GADOBUTROL 604.72 MG/ML
7 INJECTION INTRAVENOUS ONCE
Status: COMPLETED | OUTPATIENT
Start: 2023-03-31 | End: 2023-03-31

## 2023-03-31 RX ORDER — NALOXONE HYDROCHLORIDE 0.4 MG/ML
0.2 INJECTION, SOLUTION INTRAMUSCULAR; INTRAVENOUS; SUBCUTANEOUS
Status: DISCONTINUED | OUTPATIENT
Start: 2023-03-31 | End: 2023-03-31

## 2023-03-31 RX ORDER — ONDANSETRON 2 MG/ML
4 INJECTION INTRAMUSCULAR; INTRAVENOUS EVERY 6 HOURS PRN
Status: DISCONTINUED | OUTPATIENT
Start: 2023-03-31 | End: 2023-04-03 | Stop reason: HOSPADM

## 2023-03-31 RX ORDER — HYDRALAZINE HYDROCHLORIDE 20 MG/ML
10-20 INJECTION INTRAMUSCULAR; INTRAVENOUS
Status: DISCONTINUED | OUTPATIENT
Start: 2023-03-31 | End: 2023-04-01

## 2023-03-31 RX ORDER — ONDANSETRON 4 MG/1
4 TABLET, ORALLY DISINTEGRATING ORAL EVERY 6 HOURS PRN
Status: DISCONTINUED | OUTPATIENT
Start: 2023-03-31 | End: 2023-04-03 | Stop reason: HOSPADM

## 2023-03-31 RX ORDER — PROCHLORPERAZINE 25 MG
12.5 SUPPOSITORY, RECTAL RECTAL EVERY 12 HOURS PRN
Status: DISCONTINUED | OUTPATIENT
Start: 2023-03-31 | End: 2023-04-03 | Stop reason: HOSPADM

## 2023-03-31 RX ORDER — LIDOCAINE 40 MG/G
CREAM TOPICAL
Status: DISCONTINUED | OUTPATIENT
Start: 2023-03-31 | End: 2023-04-03 | Stop reason: HOSPADM

## 2023-03-31 RX ORDER — PROCHLORPERAZINE MALEATE 5 MG
5 TABLET ORAL EVERY 6 HOURS PRN
Status: DISCONTINUED | OUTPATIENT
Start: 2023-03-31 | End: 2023-04-03 | Stop reason: HOSPADM

## 2023-03-31 RX ADMIN — ACETAMINOPHEN 975 MG: 325 TABLET ORAL at 16:01

## 2023-03-31 RX ADMIN — IOPAMIDOL 75 ML: 755 INJECTION, SOLUTION INTRAVENOUS at 12:18

## 2023-03-31 RX ADMIN — SODIUM CHLORIDE 100 ML: 900 INJECTION INTRAVENOUS at 12:18

## 2023-03-31 RX ADMIN — ONDANSETRON 4 MG: 2 INJECTION INTRAMUSCULAR; INTRAVENOUS at 17:49

## 2023-03-31 RX ADMIN — GADOBUTROL 7 ML: 604.72 INJECTION INTRAVENOUS at 15:40

## 2023-03-31 RX ADMIN — LIDOCAINE HYDROCHLORIDE 5 ML: 10 INJECTION, SOLUTION EPIDURAL; INFILTRATION; INTRACAUDAL; PERINEURAL at 18:23

## 2023-03-31 ASSESSMENT — ENCOUNTER SYMPTOMS
WEAKNESS: 1
HEADACHES: 1
SPEECH DIFFICULTY: 1
NUMBNESS: 1

## 2023-03-31 ASSESSMENT — ACTIVITIES OF DAILY LIVING (ADL)
ADLS_ACUITY_SCORE: 35

## 2023-03-31 NOTE — PROGRESS NOTES
RECEIVING UNIT ED HANDOFF REVIEW    ED Nurse Handoff Report was reviewed by: Francis Coats RN on March 31, 2023 at 6:56 PM
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
## 2023-03-31 NOTE — TELEPHONE ENCOUNTER
"TC came to triage     Pt called [facility] stating \"I think I am having a stroke\"     They were going to transfer patient to RN but the call dropped - tried calling patient back but phone went to voicemail     Per below message pt spoke with RN who advised ER, unclear if patient called back again to clinic after below conversation?     Ximena BOSCH, Triage RN  [facility]       "

## 2023-03-31 NOTE — ED TRIAGE NOTES
Pt presents with stroke symptoms. Reports that around 1100 today, was attempting to text a coworker and couldn't spell out the words she wanted. Had numbness of right hand and a headache above left eye. States that she is having word finding difficulties.      Triage Assessment     Row Name 03/31/23 2049       Triage Assessment (Adult)    Airway WDL WDL       Respiratory WDL    Respiratory WDL WDL       Skin Circulation/Temperature WDL    Skin Circulation/Temperature WDL WDL       Cardiac WDL    Cardiac WDL WDL       Peripheral/Neurovascular WDL    Peripheral Neurovascular WDL X       Cognitive/Neuro/Behavioral WDL    Cognitive/Neuro/Behavioral WDL WDL

## 2023-03-31 NOTE — ED PROVIDER NOTES
History   Chief Complaint:  Stroke Symptoms     The history is provided by the patient and medical records.      Izabella Concepcion is a 67 year old female with a history of hyperlipidemia, hypercholesterolemia, and anxiety who presents with concern for stroke symptoms and word finding difficulties. The patient states she was trying to text a friend, however was unable to see the words and her text message did not make sense. She noted word-finding difficulties. This was accompanied by right upper extremity weakness followed by numbness in the right upper extremity. She reported difficulty lifting her phone. Symptom onset was approximately one hour prior to emergency department arrival, 1100 this morning while at work. She denies any symptoms to her lower extremities. The patient notes she has had a headache for the past week as well.     Independent Historian:   None - Patient Only    Review of External Notes: None    ROS:  Review of Systems   Neurological: Positive for speech difficulty, weakness, numbness and headaches.   All other systems reviewed and are negative.    Allergies:  Blood Transfusion Related (Informational Only)  Vigamox [Moxifloxacin]     Medications:    Prilosec  Trazodone     Past Medical History:    Hyperlipidemia  Hypercholesterolemia   Moderate persistent asthma  Statin intolerance   Osteopenia   Seizure   Colon polyp   Generalized anxiety disorder     Past Surgical History:    Hip arthroplasty, right   External fixator application, right wrist   Wrist hardware removal, right   Bone replacement graft   Tonsillectomy      Family History:    family history includes Cerebrovascular Disease in her mother; Diabetes in her father; Hypertension in her mother; Myocardial Infarction (age of onset: 29) in her father; Narcolepsy in her brother.    Social History:  The patient was unaccompanied to the emergency department.   reports that she has quit smoking. Her smoking use included cigarettes. She has a  12.00 pack-year smoking history. She has never used smokeless tobacco. She reports current alcohol use. She reports that she does not use drugs.  PCP: Shawanda Doran     Physical Exam     Patient Vitals for the past 24 hrs:   BP Temp Temp src Pulse Resp SpO2   03/31/23 1716 (!) 187/100 -- -- 70 10 --   03/31/23 1626 -- -- -- 73 15 98 %   03/31/23 1615 -- -- -- 70 15 97 %   03/31/23 1606 -- -- -- 71 28 96 %   03/31/23 1602 (!) 172/101 -- -- 82 -- --   03/31/23 1458 -- -- -- 69 25 --   03/31/23 1457 -- -- -- 70 20 94 %   03/31/23 1335 -- -- -- -- 19 --   03/31/23 1334 -- -- -- 73 21 --   03/31/23 1320 -- -- -- 75 18 --   03/31/23 1312 -- -- -- 72 19 --   03/31/23 1305 -- -- -- 71 17 97 %   03/31/23 1302 -- -- -- 71 15 98 %   03/31/23 1253 -- -- -- 72 14 --   03/31/23 1240 (!) 154/80 -- -- 80 10 --   03/31/23 1230 (!) 144/64 -- -- 89 13 --   03/31/23 1201 (!) 148/86 98.4  F (36.9  C) Temporal 101 22 97 %      Physical Exam  General: Laying on the ED bed, no distress  HEENT: Normocephalic, atraumatic  Cardiac: Radial pulses 2+, regular rate and rhythm  Pulm: Breathing comfortably, no accessory muscle usage, no conversational dyspnea, and lungs clear bilaterally  GI: Abdomen soft, nontender, no rigidity or guarding  MSK: No deformities  Skin: Warm and dry  Neuro: Moves all extremities, NIH below  Psych: Normal mood and affect    National Institutes of Health Stroke Scale  Exam Interval: Baseline    Score    Level of consciousness: (0)   Alert, keenly responsive    LOC questions: (0)   Answers both questions correctly    LOC commands: (0)   Performs both tasks correctly    Best gaze: (0)   Normal    Visual: (0)   No visual loss    Facial palsy: (0)   Normal symmetrical movements    Motor arm (left): (0)   No drift    Motor arm (right): (0)   No drift    Motor leg (left): (0)   No drift    Motor leg (right): (0)   No drift    Limb ataxia: (0)   Absent    Sensory: (0)   Normal- no sensory loss    Best language: (1)   Mild to  moderate aphasia    Dysarthria: (0)   Normal    Extinction and inattention: (0)   No abnormality          Total Score:  1      Emergency Department Course   ECG:  ECG results from 03/31/23   EKG 12-lead, tracing only     Value    Systolic Blood Pressure     Diastolic Blood Pressure     Ventricular Rate 72    Atrial Rate 72    RI Interval 162    QRS Duration 92        QTc 438    P Axis 64    R AXIS -41    T Axis 45    Interpretation ECG      Sinus rhythm  Left axis deviation  Low voltage QRS  Abnormal ECG  When compared with ECG of 05-JAN-2023 20:31,  Incomplete right bundle branch block is no longer Present  Minimal criteria for Anterior infarct are no longer Present  Confirmed by GENERATED REPORT, COMPUTER ([id number]),  Aasen, Bradley ([id number]) on 3/31/2023 3:34:34 PM       Imaging:  MR Brain w/o & w Contrast   Final Result   IMPRESSION:     1. Focal abnormal leptomeningeal enhancement in the left parietal lobe   sulci. This is a nonspecific finding and can be seen in infectious,   inflammatory, or neoplastic leptomeningeal processes. Subacute infarct   is considered less likely. Consider lumbar puncture.    2. No other acute intracranial abnormality appreciated. No acute   infarct, hemorrhage, or herniation.   3. A few nonspecific white matter changes most likely due to chronic   microvascular ischemic disease.      Results discussed with Jesse Mcdaniel at 4:04 PM on 3/31/2023.       NORA GUTIERREZ MD            SYSTEM ID:  [id number]      CTA Head Neck with Contrast   Final Result   IMPRESSION: Patent arteries in the head and neck without vascular   cutoff. No evidence of dissection. No aneurysm identified. No   significant stenosis.       Results discussed with Jesse Mcdaniel at 12:33 PM on 3/31/2023.       NORA GUTIERREZ MD            SYSTEM ID:  [id number]      CT Head w/o Contrast   Final Result   IMPRESSION:  No evidence of acute intracranial hemorrhage, mass, or   herniation.         NORA GUTIERREZ MD             SYSTEM ID:  [id number]      XR Lumbar Puncture Spinal Tap Diag    (Results Pending)   Report per radiology    Laboratory:  Labs Ordered and Resulted from Time of ED Arrival to Time of ED Departure   BASIC METABOLIC PANEL - Abnormal       Result Value    Sodium 140      Potassium 3.7      Chloride 104      Carbon Dioxide (CO2) 23      Anion Gap 13      Urea Nitrogen 9.1      Creatinine 0.72      Calcium 10.1      Glucose 136 (*)     GFR Estimate 90     CBC WITH PLATELETS AND DIFFERENTIAL - Abnormal    WBC Count 8.1      RBC Count 5.50 (*)     Hemoglobin 15.1      Hematocrit 46.6      MCV 85      MCH 27.5      MCHC 32.4      RDW 12.3      Platelet Count 344      % Neutrophils 60      % Lymphocytes 31      % Monocytes 7      % Eosinophils 1      % Basophils 1      % Immature Granulocytes 0      NRBCs per 100 WBC 0      Absolute Neutrophils 4.8      Absolute Lymphocytes 2.6      Absolute Monocytes 0.6      Absolute Eosinophils 0.1      Absolute Basophils 0.1      Absolute Immature Granulocytes 0.0      Absolute NRBCs 0.0     INR - Normal    INR 0.97     PARTIAL THROMBOPLASTIN TIME - Normal    aPTT 25     TROPONIN T, HIGH SENSITIVITY - Normal    Troponin T, High Sensitivity 8     GLUCOSE MONITOR NURSING POCT   GLUCOSE CSF   PROTEIN TOTAL CSF   GRAM STAIN   AEROBIC BACTERIAL CULTURE ROUTINE   HERPES SIMPLEX VIRUS 1&2 PCR   CELL COUNT WITH DIFFERENTIAL CSF      Emergency Department Course & Assessments:     Interventions:  Medications   iopamidol (ISOVUE-370) solution 75 mL (75 mLs Intravenous $Given 3/31/23 1218)   100mL Saline Flush (100 mLs Intravenous $Given 3/31/23 1218)   acetaminophen (TYLENOL) tablet 975 mg (975 mg Oral $Given 3/31/23 1601)   gadobutrol (GADAVIST) injection 7 mL (7 mLs Intravenous $Given 3/31/23 1540)      Assessments:  1207 I obtained history and performed an exam of the patient as documented above.  1208 I called a tier 1 code stroke.   CODE STROKE  1620 I rechecked the patient and explained  findings. Discussed plan of care.     Independent Interpretation (X-rays, CTs, rhythm strip):  None.     Consultations/Discussion of Management or Tests:  1213 I spoke with KARMA Guevara of the stroke neurology service regarding patient's presentation, findings, and plan of care.   1233 I spoke with Dr. Altamirano of the radiology service regarding patient's head CT and head/neck CTA.   1246 I spoke with KARMA Guevara of the stroke neurology service regarding patient's findings and plan of care.   1602 I spoke with Dr. Altamirano of the radiology service regarding patient's presentation, findings, and plan of care.   1648 I spoke with the interventional radiology service regarding patient's presentation, findings, and plan of care. Agree to come down to do LP.    I spoke with Dr. Castro of the hospitalist service who accepts the patient for admission.    Social Determinants of Health affecting care:   None    Disposition:  The patient was admitted to the hospital under the care of Dr. Castro.     Impression & Plan    CMS Diagnoses: The patient has stroke symptoms:         ED Stroke specific documentation           NIHSS PDF     Patient last known well time: 1100  ED Provider first to bedside at: 1207  CT Results received at: 1233    Thrombolytics:   Not given due to:   - minor/isolated/quickly resolving symptoms    If treating with thrombolytics: Ensure SBP<180 and DBP<105 prior to treatment with thrombolytics.  Administering thrombolytics after treatment with IV labetalol, hydralazine, or nicardipine is reasonable once BP control is established.    Endovascular Retrieval:  Not initiated due to absence of proximal vessel occlusion    See NIH stroke scale above.     Stroke Mimics were considered (including migraine headache, seizure disorder, hypoglycemia (or hyperglycemia), head or spinal trauma, CNS infection, Toxin ingestion and shock state (e.g. sepsis) .        Medical Decision Makin-year-old female presents with a left  hemispheric syndrome as described above.  Tier 1 code stroke was called and CT/CTA was without acute findings.  On MRI, there is leptomeningeal enhancement of the left parietal area which corresponds anatomically with the patient's reported deficits.  I was called to the bedside a couple hours into her stay and she was complaining of right-sided visual field deficit.  She states that when she is looking at her phone keyboard, it is difficult to see the letter P.  I evaluated each eye individually and there was no peripheral vision loss in either eye on either side.  She did have persistent reported right visual field deficit with both eyes open.  She also had some tingling/sensory deficit of the right hand third through fourth fingers.  In light of the above MRI findings, question whether she is having focal seizures in association with the inflammatory findings on the MRI.  Plan is for admission to the hospitalist service for further work-up including LP and IR, further neurology consultation.    Critical Care time:  was 0 minutes for this patient excluding procedures.    Diagnosis:    ICD-10-CM    1. Word finding difficulty  R47.89       2. Weakness of right upper extremity  R29.898       3. Right upper extremity numbness  R20.0       4. Leptomeningeal enhancement on MRI of brain  R90.89       5. Peripheral visual field defect, right  H53.451             Scribe Disclosure:  I, Lesley Hollingsworth, am serving as a scribe at 12:08 PM on 3/31/2023 to document services personally performed by Jesse Mcdaniel MD based on my observations and the provider's statements to me.      Jesse Mcdaniel MD  03/31/23 8422

## 2023-03-31 NOTE — TELEPHONE ENCOUNTER
Recall:     Patient Contact    Attempt # 2    Was call answered?  No.  Did not leave a message     Will route to basket to follow up/or confirm pt presents to ED     Ximena BOSCH, Triage RN  [facility]

## 2023-03-31 NOTE — PROCEDURES
Neurointerventional Surgery  Post-procedure note    Procedure: fluoro guided lumbar puncture    Radiologist: Jurgen Padilla MD    Fluoro Time: .1 minutes  Number of images: one   Air Kerma: 1 mGy  DAPP: 4.8 microGym2  EBL: minimal  Complications: none    Preliminary findings: (see dictation for full detail)  LP at L5-S1 yields 15 ml clear CSF    Assess/Plan:   Samples to lab  Bedrest for one hour    Jurgen Padilla MD  Pager: [phone]  Emergency pager: [phone]  Office: [phone]

## 2023-03-31 NOTE — ED NOTES
[facility]  ED Nurse Handoff Report    ED Chief complaint: Stroke Symptoms      ED Diagnosis:   Final diagnoses:   Word finding difficulty   Weakness of right upper extremity   Right upper extremity numbness   Leptomeningeal enhancement on MRI of brain   Peripheral visual field defect, right       Code Status: hospitalist to address    Allergies:   Allergies   Allergen Reactions    Blood Transfusion Related (Informational Only) Other (See Comments)     Patient has a history of a clinically significant antibody against RBC antigens.  A delay in compatible RBCs may occur.    Dogs Itching    Nickel      Other reaction(s): Other (see comments)  Unknown.      Nickel (non food)    Pollen Extract      Other reaction(s): Other (see comments)  unknown    Vigamox [Moxifloxacin] Swelling       Patient Story: Pt presents with stroke symptoms. Reports that around 1100 today, was attempting to text a coworker and couldn't spell out the words she wanted. Had numbness of right hand and a headache above left eye. States that she is having word finding difficulties.  Focused Assessment:  Pt has NIHS of 2 for right sided hemionopia and right sided hand numbness. Pt is alert and oriented. Respirations are even, easy, and unlabored. Pt states nausea and headache 9/10.     Treatments and/or interventions provided: Tynenol, blood work, CT and MRI, spinal tap  Patient's response to treatments and/or interventions: Pt symptoms changing. Pt stable    To be done/followed up on inpatient unit:  inpatient orders    Does this patient have any cognitive concerns?:  Na    Activity level - Baseline/Home:  Independent  Activity Level - Current:   Stand with Assist    Patient's Preferred language: English   Needed?: No    Isolation: None  Infection: Not Applicable  Patient tested for COVID 19 prior to admission: NO  Bariatric?: No    Vital Signs:   Vitals:    03/31/23 1606 03/31/23 1615 03/31/23 1626 03/31/23 1716   BP:     (!) 187/100   Pulse: 71 70 73 70   Resp: 28 15 15 10   Temp:       TempSrc:       SpO2: 96% 97% 98%        Cardiac Rhythm:     Was the PSS-3 completed:   yES  What interventions are required if any?               Family Comments:   OBS brochure/video discussed/provided to patient/family: N/A              Name of person given brochure if not patient:               Relationship to patient:     For the majority of the shift this patient's behavior was Green.   Behavioral interventions performed were .    ED NURSE PHONE NUMBER: *[phone]
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
## 2023-03-31 NOTE — TELEPHONE ENCOUNTER
Spoke with patient.  Was trying to send a text message and was unable make is come out right.  She was unable to  the phone due to weakness with her right hand.  The hand started going numb.  She is unsure how long that lasted but is better now. She reports her mother had multiple TIAs. She is able to talk in full clear sentences. She is currently driving to [employer] and is blocks away. She did not want to go to ED. Advised that she should call 911 and head straight to the ED.    Patient agreed and felt comfortable continuing to drive the block or so.  Advise to pull right up to the door and worry about her car later.  Needs to be evaluated immediatly.    Luke Payan, ORESTESN, RN, PHN  [facility] ~ Registered Nurse  Clinic Triage ~ [place] [facility] [place]  March 31, 2023        Reason for Disposition    New neurologic deficit that is present NOW, sudden onset of ANY of the following: * Weakness of the face, arm, or leg on one side of the body* Numbness of the face, arm, or leg on one side of the body* Loss of speech or garbled speech    Protocols used: NEUROLOGIC DEFICIT-A-OH

## 2023-03-31 NOTE — PHARMACY-ADMISSION MEDICATION HISTORY
Pharmacy Medication History  Admission medication history interview status for the 3/31/2023  admission is complete. See EPIC admission navigator for prior to admission medications     Location of Interview: Patient room  Medication history sources: Patient and Surescripts    Significant changes made to the medication list:  Added all medications     Additional medication history information:   Patient reports that she takes the omeprazole on an as needed basis and hasn't needed to take any in awhile.    Medication reconciliation completed by provider prior to medication history? No    Time spent in this activity: 10 minutes    Prior to Admission medications    Medication Sig Last Dose Taking? Auth Provider Long Term End Date   omeprazole (PRILOSEC) 20 MG DR capsule Take 20 mg by mouth daily as needed  at prn Yes Unknown, Entered By History         The information provided in this note is only as accurate as the sources available at the time of update(s)

## 2023-04-01 ENCOUNTER — APPOINTMENT (OUTPATIENT)
Dept: OCCUPATIONAL THERAPY | Facility: CLINIC | Age: 68
DRG: 947 | End: 2023-04-01
Attending: INTERNAL MEDICINE
Payer: MEDICARE

## 2023-04-01 ENCOUNTER — HOSPITAL ENCOUNTER (INPATIENT)
Dept: NEUROLOGY | Facility: CLINIC | Age: 68
Discharge: HOME | DRG: 947 | End: 2023-04-01
Attending: PSYCHIATRY & NEUROLOGY
Payer: MEDICARE

## 2023-04-01 ENCOUNTER — APPOINTMENT (OUTPATIENT)
Dept: SPEECH THERAPY | Facility: CLINIC | Age: 68
DRG: 947 | End: 2023-04-01
Attending: INTERNAL MEDICINE
Payer: MEDICARE

## 2023-04-01 LAB
C GATTII+NEOFOR DNA CSF QL NAA+NON-PROBE: NEGATIVE
CMV DNA CSF QL NAA+NON-PROBE: NEGATIVE
E COLI K1 AG CSF QL: NEGATIVE
EV RNA SPEC QL NAA+PROBE: NEGATIVE
GLUCOSE BLDC GLUCOMTR-MCNC: 101 MG/DL (ref 70–99)
GLUCOSE BLDC GLUCOMTR-MCNC: 141 MG/DL (ref 70–99)
GLUCOSE BLDC GLUCOMTR-MCNC: 89 MG/DL (ref 70–99)
GP B STREP DNA CSF QL NAA+NON-PROBE: NEGATIVE
HAEM INFLU DNA CSF QL NAA+NON-PROBE: NEGATIVE
HHV6 DNA CSF QL NAA+NON-PROBE: NEGATIVE
HSV1 DNA CSF QL NAA+NON-PROBE: NEGATIVE
HSV1 DNA CSF QL NAA+PROBE: NOT DETECTED
HSV2 DNA CSF QL NAA+NON-PROBE: NEGATIVE
HSV2 DNA CSF QL NAA+PROBE: NOT DETECTED
L MONOCYTOG DNA CSF QL NAA+NON-PROBE: NEGATIVE
N MEN DNA CSF QL NAA+NON-PROBE: NEGATIVE
PARECHOVIRUS A RNA CSF QL NAA+NON-PROBE: NEGATIVE
S PNEUM DNA CSF QL NAA+NON-PROBE: NEGATIVE
VZV DNA CSF QL NAA+NON-PROBE: NEGATIVE

## 2023-04-01 PROCEDURE — 97165 OT EVAL LOW COMPLEX 30 MIN: CPT | Mod: GO

## 2023-04-01 PROCEDURE — 95816 EEG AWAKE AND DROWSY: CPT

## 2023-04-01 PROCEDURE — 92610 EVALUATE SWALLOWING FUNCTION: CPT | Mod: GN | Performed by: REHABILITATION PRACTITIONER

## 2023-04-01 PROCEDURE — 99233 SBSQ HOSP IP/OBS HIGH 50: CPT | Performed by: INTERNAL MEDICINE

## 2023-04-01 PROCEDURE — 250N000011 HC RX IP 250 OP 636: Performed by: INTERNAL MEDICINE

## 2023-04-01 PROCEDURE — 120N000001 HC R&B MED SURG/OB

## 2023-04-01 PROCEDURE — 99207 PR NO BILLABLE SERVICE THIS VISIT: CPT | Performed by: NURSE PRACTITIONER

## 2023-04-01 PROCEDURE — 250N000013 HC RX MED GY IP 250 OP 250 PS 637: Performed by: INTERNAL MEDICINE

## 2023-04-01 RX ORDER — HYDRALAZINE HYDROCHLORIDE 20 MG/ML
10 INJECTION INTRAMUSCULAR; INTRAVENOUS EVERY 4 HOURS PRN
Status: DISCONTINUED | OUTPATIENT
Start: 2023-04-01 | End: 2023-04-03 | Stop reason: HOSPADM

## 2023-04-01 RX ADMIN — ACETAMINOPHEN 650 MG: 325 TABLET, FILM COATED ORAL at 01:53

## 2023-04-01 RX ADMIN — ACETAMINOPHEN 650 MG: 325 TABLET, FILM COATED ORAL at 12:40

## 2023-04-01 RX ADMIN — ONDANSETRON 4 MG: 4 TABLET, ORALLY DISINTEGRATING ORAL at 15:55

## 2023-04-01 RX ADMIN — ACETAMINOPHEN 650 MG: 325 TABLET, FILM COATED ORAL at 15:55

## 2023-04-01 RX ADMIN — ACETAMINOPHEN 650 MG: 325 TABLET, FILM COATED ORAL at 21:50

## 2023-04-01 ASSESSMENT — ACTIVITIES OF DAILY LIVING (ADL)
ADLS_ACUITY_SCORE: 35
PREVIOUS_RESPONSIBILITIES: MEAL PREP;LAUNDRY;SHOPPING;MEDICATION MANAGEMENT;FINANCES;DRIVING;WORK

## 2023-04-01 NOTE — PROGRESS NOTES
ED admit with RUE weakness and visual field deficit. Alert and oriented X4. Independent in room. Pain managed with PRN tylenol. Was hypertensive earlier but resolved.  pure wick in place with good out. On Tele with NSR. Neuro intact. [facility] seizure precaution. [facility]

## 2023-04-01 NOTE — PROGRESS NOTES
[facility] EEG #  portable, ordered by Dr. Epstein.    Pt is awake, briefly drowsy on EEG.  Cooperative and can follow commands.   I started to attempt photic stimulation but the light is currently broken .      Actual time is 1 hr. Later than what is on monitor.

## 2023-04-01 NOTE — CONSULTS
"Brief Stroke Note:     67 F with past medical history significant for remote migraine, HLD, anxiety, recent bleeding complication from colonoscopy, recent unknown infection while abroad. Stroke code called 3/31 for evaluation of transient right arm numbness, difficulty speaking, subjective right visual deficit and headache. MRI brain negative for acute stroke but did show left sided leptomeningeal enhancement.    CUS requested for further evaluation of L ICA given concern for irregularity on stroke team evaluation. CUS without evidence of L ICA lesion. Low suspicion for TIA given progression of symptoms, headache.     Thank you for this consult. No further stroke evaluation is indicated, we will sign off. Please contact us with additional questions.     LUIGI Cassidy, CNP  Neurology  04/01/2023 3:56 PM  To page stroke neurology after hours or on a subsequent day, click here: [facility]COM  Choose \"On Call\" tab at top, then search dropdown box for \"Neurology Adult\" & press Enter, look for Neuro ICU/Stroke       "
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
## 2023-04-01 NOTE — PROGRESS NOTES
"Speech Language Pathology- Clinical Swallow Evaluation     04/01/23 0940   Appointment Info   Signing Clinician's Name / Credentials (SLP) Sabra Yancey MS CCC SLP   General Information   Onset of Illness/Injury or Date of Surgery 03/31/23   Referring Physician Cristiane Castro MD   Patient/Family Therapy Goal Statement (SLP) Patient is very hungry.   Pertinent History of Current Problem Per chart review \"Izabella Concepcion is a 67 year old relatively healthy female who is admitted on 03/31/23 after presenting for episode of right-sided upper extremity weakness and numbness and word finding difficulties and will be admitted on 3/31/2023. MRI revealed \"Focal abnormal leptomeningeal enhancement in the left parietal lobe   sulci. This is a nonspecific finding and can be seen in infectious,   inflammatory, or neoplastic leptomeningeal processes. Subacute infarct   is considered less likely.\" SLP consulted.   General Observations Patient is seen sitting up in chair. She is alert/cooperative/pleasant. She reports h/o intermittent difficulty swallowing/globus sensation and \"forgetting how to swallow\" x 1-2  months. She would like to pursue testing during inpatient stay if possible. She reports recently being diagnosed with GERD. She reports she has a PRN medication for GERD which she has not been taking. The patient did experience a transient episode of word retrieval difficulty during evaluation, but pt generally able to express self effectively.   Type of Evaluation   Type of Evaluation Swallow Evaluation   Oral Motor   Oral Musculature generally intact   Dentition (Oral Motor)   Dentition (Oral Motor) adequate dentition   Facial Symmetry (Oral Motor)   Facial Symmetry (Oral Motor) WNL   Lip Function (Oral Motor)   Lip Range of Motion (Oral Motor) WNL   Tongue Function (Oral Motor)   Tongue ROM (Oral Motor) WNL   Vocal Quality/Secretion Management (Oral Motor)   Vocal Quality (Oral Motor) WNL   General Swallowing Observations "   Past History of Dysphagia none in EHR. Pt reports h/o difficulty swallowing/globus sensation x 1-2  months. She would like to pursue testing during inpatient stay if possible. She reports recently being diagnosed with GERD. She reports she has a PRN medication for GERD which she has not been taking.   Respiratory Support (General Swallowing Observations) none   Current Diet/Method of Nutritional Intake (General Swallowing Observations, NIS) NPO   Swallowing Evaluation Clinical swallow evaluation   Clinical Swallow Evaluation   Feeding Assistance no assistance needed   Clinical Swallow Evaluation Textures Trialed thin liquids;pureed;solid foods   Clinical Swallow Eval: Thin Liquid Texture Trial   Mode of Presentation, Thin Liquids cup;self-fed   Volume of Liquid or Food Presented 3 oz   Oral Phase of Swallow WFL   Pharyngeal Phase of Swallow   (no overt s/s aspiration)   Clinical Swallow Evaluation: Puree Solid Texture Trial   Mode of Presentation, Puree spoon;self-fed   Pharyngeal Phase, Puree   (No overt s/s aspiration)   Clinical Swallow Evaluation: Solid Food Texture Trial   Mode of Presentation self-fed   Volume Presented margaux cracker   Oral Phase WFL   Pharyngeal Phase   (no overt s/s aspiration)   Esophageal Phase of Swallow   Patient reports or presents with symptoms of esophageal dysphagia Yes   Esophageal comments Pt reports h/o difficulty swallowing/globus sensation x 1-2  months. She would like to pursue testing during inpatient stay if possible. She reports recently being diagnosed with GERD. She reports she has a PRN medication for GERD which she has not been taking.   Swallowing Recommendations   Diet Consistency Recommendations thin liquids (level 0);regular diet   Supervision Level for Intake patient independent   Recommended Feeding/Eating Techniques (Swallow Eval) maintain upright sitting position for eating   Medication Administration Recommendations, Swallowing (SLP) per pt preference  "  Instrumental Assessment Recommendations VFSS (videofluoroscopic swallowing study)   Comment, Swallowing Recommendations Patient would like to pursue VFSS+esophagram for further evaluation of esophageal dysphagia. This could be completed on outpatient basis, though pt would prefer to complete testing while in house is possible.   Clinical Impression   Clinical Impression Comments Patient seen for clinical swallow evaluation. Oral mech unremarkable. Oropharyngeal swallow mechanism appears WNF with no overt s/s aspiration observed with any textures trialed. Patient does report h/o intermittently \"forgetting how to swallow\" and globus sensation for 1-2  months. She states she was recently diagnosed with GERD but has not been taking PRN medication as she has not felt symptomatic. She would like to have further evaluation/imaging completed while inpatient if possible. Will plan for VFSS Monday, though this could be done on OP basis if patient is discharged. Recommend regular solids/thin liquids. Upright positioning during meals.   SLP Total Evaluation Time   Eval: oral/pharyngeal swallow function, clinical swallow Minutes (73490) 25   SLP Goals   Therapy Frequency (SLP Eval) 2 times/wk   SLP Predicted Duration/Target Date for Goal Attainment 04/03/23   SLP Goals Swallow   SLP: Safely tolerate diet without signs/symptoms of aspiration Regular diet;Thin liquids;Independently   SLP Discharge Planning   SLP Plan SLP: Diet tolerance. VFSS+esophagram Monday if patient still in house. Evaluate needs for speech/language evaluation.   SLP Discharge Recommendation home   SLP Rationale for DC Rec Anticipate swallow goals will be met during inpatient stay.   SLP Brief overview of current status  Oropharyngeal swallow mechanism appears WNF with no overt s/s aspiration observed with any textures trialed. Patient does report h/o intermittently \"forgetting how to swallow\" and globus sensation for 1-2  months. She states she was " recently diagnosed with GERD but has not been taking PRN medication as she has not felt symptomatic. She would like to have further evaluation/imaging completed while inpatient if possible. Will plan for VFSS Monday, though this could be done on OP basis if patient is discharged. Recommend regular solids/thin liquids. Upright positioning during meals.   Total Session Time   Total Session Time (sum of timed and untimed services) 25

## 2023-04-01 NOTE — PLAN OF CARE
PT eval orders received, chart reviewed. OT initiated mobility. Per discussion with OT, pt mobilizing near baseline, no skilled PT needs identified. PT will complete order

## 2023-04-01 NOTE — PROGRESS NOTES
04/01/23 0838   Appointment Info   Signing Clinician's Name / Credentials (OT) Leslie Soliman OTR/L   Living Environment   People in Home alone   Current Living Arrangements condominium   Home Accessibility no concerns   Transportation Anticipated family or friend will provide  (daughter)   Self-Care   Usual Activity Tolerance excellent   Current Activity Tolerance good   Regular Exercise Yes   Activity/Exercise Type walking;strength training   Exercise Amount/Frequency 3-5 times/wk   Equipment Currently Used at Home none   Fall history within last six months no   Activity/Exercise/Self-Care Comment IND at baseline   Instrumental Activities of Daily Living (IADL)   Previous Responsibilities meal prep;laundry;shopping;medication management;finances;driving;work   IADL Comments hired cleaning service   General Information   Onset of Illness/Injury or Date of Surgery 03/31/23   Referring Physician Cristiane Castro MD   Patient/Family Therapy Goal Statement (OT) Go home   Additional Occupational Profile Info/Pertinent History of Current Problem Per chart: Izabella Concepcion is a 67 year old relatively healthy female who is admitted on 03/31/23 after presenting for episode of right-sided upper extremity weakness and numbness and word finding difficulties and will be admitted on 3/31/2023.   Existing Precautions/Restrictions fall   Cognitive Status Examination   Orientation Status orientation to person, place and time   Visual Perception   Visual Impairment/Limitations WNL  (Pt's pursuits, saccades and peripheral vision screened and WNL; pt reports the vision changes she had noticed yesterday with difficulty seeing with her R eye have resolved)   Sensory   Sensory Comments   (Pt reports she had numbness in R hand yesterday but it has resolved)   Pain Assessment   Patient Currently in Pain No   Posture   Posture not impaired   Range of Motion Comprehensive   General Range of Motion no range of motion deficits identified    Strength Comprehensive (MMT)   General Manual Muscle Testing (MMT) Assessment no strength deficits identified   Coordination   Upper Extremity Coordination No deficits were identified   Coordination Comments Pt completed screening touching nose to arm crossing midline WNL; L side was a little more difficult per pt report but she reports she is R hand dominant   Transfers   Transfers No deficits identified;bed-chair transfer;toilet transfer   Transfer Skill: Bed to Chair/Chair to Bed   Bed-Chair [place] (Transfers) supervision   Toilet Transfer   Type (Toilet Transfer) sit-stand;stand-sit   [place] Level (Toilet Transfer) supervision   Balance   Balance Assessment standing balance: static   Balance Comments Pt had 1 LOB that she self corrected   Activities of Daily Living   BADL Assessment/Intervention upper body dressing;grooming   Upper Body Dressing Assessment/Training   Position (Upper Body Dressing) unsupported sitting   [place] Level (Upper Body Dressing) supervision   Grooming Assessment/Training   Position (Grooming) sink side   [place] Level (Grooming) supervision   Clinical Impression   Criteria for Skilled Therapeutic Interventions Met (OT) Evaluation only   OT Total Evaluation Time   OT Eval, Low Complexity Minutes (92543) 20   OT Discharge Planning   OT Plan Eval only   OT Discharge Recommendation (DC Rec) home with assist  (assistance for IADLs from daughter as needed, including ride home)   OT Rationale for DC Rec Pt is IND in all ADLs/IADLs at baseline. Pt's symptoms with vision and R side numbness have resolved and were screened WNL. Pt would beneift a ride home and may need assistance with IADLs, which she reports could be supported by her daughter who lives near by. No inpatient OT needs. Defer DC plan to care coordinator.   OT Brief overview of current status SBA in room   Total Session Time   Total Session Time (sum of timed and untimed services) 20

## 2023-04-01 NOTE — UTILIZATION REVIEW
Admission Status; Secondary Review Determination    Under the authority of the Utilization [place] gement Committee, the utilization review process indicated a secondary review on the above patient. The review outcome is based on review of the medical records, discussions with staff, and applying clinical experience noted on the date of the review.    (x) Inpatient Status Appropriate - This patient's medical care is consistent with medical management for inpatient care and reasonable inpatient medical practice.    RATIONALE FOR DETERMINATION: 67-year-old female who developed acute onset of inability to see words with nonsensical text messaging and word finding difficulties accompanied by right upper extremity weakness followed by numbness.  Patient was hypertensive in the emergency room with relatively normal neurological exam except for partial hemianopia.  Patient had severe headache and MRI revealed a focal abnormal leptomeningeal enhancement in the left parietal lobe sulcus I which can be seen in infectious, inflammatory or neoplastic leptomeningeal processes.  Less likely subacute infarct.  Due to patient's significant presenting neurological symptoms with MRI abnormalities patient expected to require greater than 2 nights in the hospital for evaluation and management.    Reviewed case with attending, if patient symptoms/signs completely resolve and neurology recommends outpatient evaluation follow-up, then 1 night observation stay would be appropriate.       At the time of admission with the information available to the attending physician more than 2 nights Hospital complex care was anticipated, based on patient risk of adverse outcome if treated as outpatient and complex care required. Inpatient admission is appropriate based on the Medicare guidelines.    This document was produced using voice recognition software    The information on this document is developed by the utilization review team in order for  the business office to ensure compliance. This only denotes the appropriateness of proper admission status and does not reflect the quality of care rendered.    The definitions of Inpatient Status and Observation Status used in making the determination above are those provided in the CMS Coverage Manual, Chapter 1 and Chapter 6, section 70.4.    Sincerely,    Hao Venegas MD  Utilization Review  Physician Advisor  [facility].

## 2023-04-01 NOTE — PROGRESS NOTES
[facility]    Medicine Progress Note - Hospitalist Service    Date of Admission:  3/31/2023    Assessment & Plan   Izabella Concepcion is a 67 year old relatively healthy female who is admitted on 03/31/23 after presenting for episode of right-sided upper extremity weakness and numbness and word finding difficulties and will be admitted on 3/31/2023.     Abnormal leptomeningeal enhancement of the left parietal lobe sulci   Presenting with right upper extremity weakness, word finding difficulty and subjective right hemianopsia   * CT head- negative  * CTA- Patent arteries in the head and neck without vascular cutoff. No evidence of dissection. No aneurysm identified. No significant stenosis  * MRI brain- Focal abnormal leptomeningeal enhancement in the left parietal lobe  sulci. This is a nonspecific finding and can be seen in infectious, inflammatory, or neoplastic leptomeningeal processes. Subacute infarct is considered less likely    Code stroke called in ER    LP- showing 0 nucleated cells, normal glucose, elevated protein 60.3 (nl <45):    CSF neg for CMV, HSV type1 and 2, cryptococcus    Oligoclonal bands pending     General Neuro consult appreciated     EEG- no evidence of seizures    In am, she reported feeling fine, later on, reported having headache again, subjective right hemianopsia    Gen Neuro aware, plan to repeat MRI brain in am    Possible complex migraine? Vs TIA - less likely; states taht she had migraine headache 12 years ago?    Neurochecks q4h    PT/OT    SLP- rec regular diet    HbA1c 5.5    , HDL 66    Tylenol prn for headache    Please transfer to Neuro floor if bed available     HTN - Initially 140's > Up to 180 post procedure, suspect secondary to stress. No history of HTN, not on anti-hypertensive.     BP was fine in am, up to 143/81 when she reported headache    Hydralazine iv prn    Monitor     Left ICA irregularity noted on CT by neurology  Carotid US- No  "plaque formation in the right internal carotid artery; No plaque formation in the left internal carotid artery.     GERD - PPI PRN   * EGD in 2/2023: Grade a esophagitis, stomach polyp biopsied.      Severe febrile illness in [place], likely representing COVID in November - Patient states she was told she had a \"severe viral infection of her blood\" but not tested for Covid.  Subsequently tested positive the next day.  Since then she has noted bouts of nausea and vomiting.      Recent constipation with colitis on 12/2023.  Subsequent colonoscopy normal complicated by polypectomy bleeding.  Hospitalized for colonoscopy with cautery and clip.  * Hgb nl 15.1 on admission.     Globus sensation     reported globus sensation on/off for 1-2 months     SLP- reg diet     Xray esophagram ordered but cannot be done until Monday     also, VFSS on Monday vs outpatient           Diet: Regular Diet Adult    DVT Prophylaxis: Pneumatic Compression Devices  Vazquez Catheter: Not present  Lines: None     Cardiac Monitoring: ACTIVE order. Indication: Stroke, acute (48 hours)  Code Status: Full Code      Clinically Significant Risk Factors Present on Admission                               Disposition Plan      Expected Discharge Date: 04/02/2023                  Lillie Handley MD  Hospitalist Service  [facility]  Securely message with [employer] (more info)  Text page via [employer] Paging/Directory   ______________________________________________________________________    Interval History   Was feeling fine in the morning, symptom-free; later on- started having left side-headache and right visual field cut? (although was able to see 1 or 2 fingers on exam); also seemed to have some word-finding difficulties whihc she did not have in the morning  - denies chest pain, no SOB  - reported some nausea, no vomiting  - no numbness/weakness at the time of my examination    Physical Exam   Vital Signs: Temp: 98  F (36.7  C) " Temp src: Oral BP: 117/70 Pulse: 59   Resp: 18 SpO2: 97 % O2 Device: None (Room air)    Weight: 0 lbs 0 oz    General: awake, alert, NAD  HEENT- head- normocephalic, atraumatic, PERRL  RESP: bilateral air entry, no wheezing, no rales, no crackles  CV- S1S2, RRR, no murmurs, no rubs  GI- abd- soft, nonT, nonD, BS present  SKIN-no rashes, no cyanosis  MSK- no joint deformities  Neuro: AAOX3, motor strength 5/5 bilateral upper and lower extremities, sensorial grossly intact, some mild word-finding difficulties, visual fields seems intact    Medical Decision Making       55 MINUTES SPENT BY ME on the date of service doing chart review, history, exam, documentation & further activities per the note.      Data     I have personally reviewed the following data over the past 24 hrs:    TSH: N/A T4: N/A A1C: 5.5       Imaging results reviewed over the past 24 hrs:   Recent Results (from the past 24 hour(s))   XR Lumbar Puncture Spinal Tap Diag    Narrative    FLUOROSCOPICALLY-GUIDED LUMBAR PUNCTURE  3/31/2023 6:46 PM    INDICATION: 67-year-old patient with MRI brain revealing leptomeningeal enhancement. Lumbar puncture has been requested to obtain cerebrospinal fluid (CSF) for analysis.    CONSENT: The procedure and its indications, major risks, benefits, and alternatives were discussed. Risks include, but are not limited to, pain, headache, hemorrhage, infection, nerve damage, spinal cord damage, and allergic reaction. Understanding was   acknowledged and informed consent was obtained.    FLUOROSCOPIC TIME: 0.1 minutes   AIR KERMA: 1 mGy  DAP: 4.8 microGym2    NUMBER OF IMAGES: 1    ESTIMATED BLOOD LOSS: Trace    PROCEDURE: The patient was placed in prone position upon the fluoroscopic table. The skin of the back was prepped and draped in sterile fashion.  Using fluoroscopic guidance, the L5 - S1 level was localized. The skin was infiltrated with 1% lidocaine.   Using direct fluoroscopic visualization, a 20 gauge spinal  needle was advanced into the thecal sac at the L5-S1 level. 15 mL of clear CSF was passively obtained in total. This was divided between four labeled tubes. The needle was then removed. A   dressing was applied. The procedure appeared well-tolerated and was without apparent complication.      Impression    CONCLUSION:   Fluoroscopically-guided lumbar puncture yields 15 mL of clear cerebrospinal fluid.   US Carotid Bilateral    Narrative    EXAM: US CAROTID BILATERAL  LOCATION: [facility]  DATE/TIME: 3/31/2023 9:16 PM    INDICATION: follow up for carotid irregularity per neurology  COMPARISON: CTA 03/31/2023  TECHNIQUE: Duplex exam performed utilizing 2D gray-scale imaging, Doppler interrogation with color-flow and spectral waveform analysis. The percent diameter stenosis is determined using NASCET criteria and Society of Radiologists in Ultrasound Consensus   Criteria.    FINDINGS:    RIGHT: No plaque at the bifurcation. The peak systolic velocity in the ICA is less than 125 cm/sec. Normal velocities in the ECA. Antegrade flow within the vertebral artery.     LEFT: No plaque at the bifurcation. The peak systolic velocity in the ICA is less than 125 cm/sec. Normal velocities in the ECA. Antegrade flow within the vertebral artery.    VELOCITY CHART:  CCA   Right: 93/17 cm/s   Left: 109/27 cm/s  ICA   Right: 108/32 cm/s   Left: 112/44 cm/s  ECA   Right: 122/22 cm/s   Left: 140/27 cm/s  ICA/CCA PSV Ratio   Right: 1.68   Left: 1.4      Impression    IMPRESSION:  1.  No plaque formation in the right internal carotid artery.  2.  No plaque formation in the left internal carotid artery.  3.  Flow within the vertebral arteries is antegrade.

## 2023-04-02 ENCOUNTER — APPOINTMENT (OUTPATIENT)
Dept: MRI IMAGING | Facility: CLINIC | Age: 68
DRG: 947 | End: 2023-04-02
Attending: PSYCHIATRY & NEUROLOGY
Payer: MEDICARE

## 2023-04-02 LAB — GLUCOSE BLDC GLUCOMTR-MCNC: 91 MG/DL (ref 70–99)

## 2023-04-02 PROCEDURE — A9585 GADOBUTROL INJECTION: HCPCS | Performed by: INTERNAL MEDICINE

## 2023-04-02 PROCEDURE — 255N000002 HC RX 255 OP 636: Performed by: INTERNAL MEDICINE

## 2023-04-02 PROCEDURE — 250N000013 HC RX MED GY IP 250 OP 250 PS 637: Performed by: HOSPITALIST

## 2023-04-02 PROCEDURE — G1010 CDSM STANSON: HCPCS

## 2023-04-02 PROCEDURE — 70553 MRI BRAIN STEM W/O & W/DYE: CPT | Mod: MG

## 2023-04-02 PROCEDURE — 99232 SBSQ HOSP IP/OBS MODERATE 35: CPT | Performed by: INTERNAL MEDICINE

## 2023-04-02 PROCEDURE — 250N000013 HC RX MED GY IP 250 OP 250 PS 637: Performed by: PSYCHIATRY & NEUROLOGY

## 2023-04-02 PROCEDURE — 120N000001 HC R&B MED SURG/OB

## 2023-04-02 RX ORDER — DIVALPROEX SODIUM 500 MG/1
500 TABLET, DELAYED RELEASE ORAL EVERY 12 HOURS SCHEDULED
Status: DISCONTINUED | OUTPATIENT
Start: 2023-04-02 | End: 2023-04-03 | Stop reason: HOSPADM

## 2023-04-02 RX ORDER — GADOBUTROL 604.72 MG/ML
6 INJECTION INTRAVENOUS ONCE
Status: COMPLETED | OUTPATIENT
Start: 2023-04-02 | End: 2023-04-02

## 2023-04-02 RX ORDER — POLYETHYLENE GLYCOL 3350 17 G/17G
17 POWDER, FOR SOLUTION ORAL 2 TIMES DAILY PRN
Status: DISCONTINUED | OUTPATIENT
Start: 2023-04-02 | End: 2023-04-03 | Stop reason: HOSPADM

## 2023-04-02 RX ADMIN — POLYETHYLENE GLYCOL 3350 17 G: 17 POWDER, FOR SOLUTION ORAL at 22:13

## 2023-04-02 RX ADMIN — GADOBUTROL 6 ML: 604.72 INJECTION INTRAVENOUS at 06:33

## 2023-04-02 RX ADMIN — DIVALPROEX SODIUM 500 MG: 500 TABLET, DELAYED RELEASE ORAL at 22:02

## 2023-04-02 RX ADMIN — DIVALPROEX SODIUM 500 MG: 500 TABLET, DELAYED RELEASE ORAL at 13:56

## 2023-04-02 ASSESSMENT — ACTIVITIES OF DAILY LIVING (ADL)
ADLS_ACUITY_SCORE: 35

## 2023-04-02 NOTE — PROGRESS NOTES
Alert and oriented X4. Hypertensive in RA. Up SBA. Pain managed with PRN tylenol. Voiding well per bathroom. On tele with NSR. Seizure precaution maintained. Had some confusion during shift. Pt complained of some headache,said she felt she's getting forgetful per day and requested a place to write password. Provide was notified. Neuro is intact. Cont to monitor.

## 2023-04-02 NOTE — PROGRESS NOTES
Pt A&O x4, up x SBA. VSS.RA. Neuro intact, mild headache in the morning, but improved through the day. Tele: NSR. Continues seizure precaution. Possible discharge tomorrow.

## 2023-04-02 NOTE — PROGRESS NOTES
[facility]  Neuroscience and Spine [place]  Neurology Daily Note      Admission Date:3/31/2023   Date of service: 04/02/2023   Hospital Day: 3                                                   Assessment and Plan:     67-year-old history of dyslipidemia presenting with acute onset of speech changes associated right-sided arm weakness and numbness which were transient followed with throbbing left-sided headache-improving.      Differential complicated migraine vs subacute infarct other consideration partial seizure    EEG -with no epileptogenic potential/electrographic seizures     Abnormal MRI brain findings suggestive of left parietal lobe sulci enhancement of leptomeningeal  CSF findings noninfectious, mild elevated CSF protein several of the CSF labs were pending including CSF cytology oligoclonal bands IgG index      4/1 had  recurrent transient spells of speech changes  Repeat MR brain - improved leptomeningeal enhancement persistent T2 flair hyperintense signal left parietal      PLAN    Start Depakote 500 mg BID   CSF cytology, oligoclonal bands, Ig index, autoimmune encephalopathy panel pending   If above work up is unrevealing Suggest considering a stroke team input possible subacute infarct   Will continue to follow    Karley Epstein MD on 4/2/2023 at 4:56 PM        Interval History:       Recurrence of speech changes transient          Medications:   Scheduled Meds:    divalproex sodium delayed-release  500 mg Oral Q12H FELIBETRO (08/20)     sodium chloride (PF)  3 mL Intracatheter Q8H     PRN Meds: acetaminophen, hydrALAZINE, lidocaine 4%, lidocaine (buffered or not buffered), - MEDICATION INSTRUCTIONS -, - MEDICATION INSTRUCTIONS -, ondansetron **OR** ondansetron, pantoprazole, prochlorperazine **OR** prochlorperazine **OR** prochlorperazine, sodium chloride (PF)        Physical Exam:   Vitals: Temp: 98.2  F (36.8  C) Temp src: Oral BP: 106/62 Pulse: 63   Resp: 18 SpO2: 97 % O2 Device:  None (Room air)    Vital Signs with Ranges: Temp:  [97.9  F (36.6  C)-98.3  F (36.8  C)] 98.2  F (36.8  C)  Pulse:  [58-63] 63  Resp:  [18] 18  BP: (106-152)/(62-78) 106/62  SpO2:  [91 %-97 %] 97 %    On general examination the patient was in no acute distress. No labored breathing. Mood and affect was normal       Neurologic:  Mental status: alert and oriented  to the current situations  fund of knowledge intact.  Intact attention Speech and Language: Comprehension and spontaneous speech are normal. No dysarthria. Pupils equal and round Face -symmetric Eye closure- intact. Tongue movements- normal ; Motor strength- moves all four limbs equally ; No tremors or involuntary movements Gait: Rising from a chair without hand support; normal associative movements of arms; casual gait      Karley Epstein MD  Neurologist  [facility]  Office [phone]

## 2023-04-02 NOTE — PROGRESS NOTES
[facility]    Medicine Progress Note - Hospitalist Service    Date of Admission:  3/31/2023    Assessment & Plan   Izabella Concepcion is a 67 year old relatively healthy female who is admitted on 03/31/23 after presenting for episode of right-sided upper extremity weakness and numbness and word finding difficulties and will be admitted on 3/31/2023.     Abnormal leptomeningeal enhancement of the left parietal lobe sulci   Right upper extremity weakness, word finding difficulty and subjective right hemianopsia   * CT head- negative  * CTA- Patent arteries in the head and neck without vascular cutoff. No evidence of dissection. No aneurysm identified. No significant stenosis  * MRI brain- Focal abnormal leptomeningeal enhancement in the left parietal lobe  sulci. This is a nonspecific finding and can be seen in infectious, inflammatory, or neoplastic leptomeningeal processes. Subacute infarct is considered less likely    Code stroke called in ER    LP- showing 0 nucleated cells, normal glucose, elevated protein 60.3 (nl <45):    CSF neg for CMV, HSV type1 and 2, cryptococcus    Oligoclonal bands pending     General Neuro consult appreciated     EEG- no evidence of seizures    4/1 am- she reported feeling fine, later on, reported having headache again, subjective right hemianopsia    Gen Neuro aware  * Repeat MRI brain in am- Left parietal lobe leptomeningeal enhancement has improved, however, persistent corresponding leptomeningeal T2 FLAIR hyperintense signal is present. This is favored to represent focal infectious or inflammatory process. Leptomeningeal metastasis or subacute infarct are considered to be less likely    Possible complex migraine? Vs seizure vs TIA - less likely; states that she had migraine headache 12 years ago?    Started on Depakote 500 mg po BID as per Neurology    Neurochecks q4h    PT/OT- no need identified    SLP- rec regular diet    HbA1c 5.5    , HDL  "66    Tylenol prn for headache    Can transfer to Neuro floor if bed available     Should follow up with Neurology as outpatient     HTN - Initially 140's > Up to 180 post procedure, suspect secondary to stress. No history of HTN, not on anti-hypertensive.     BP on higher side today SBP in 140-150's    Hydralazine iv prn    Monitor     Left ICA irregularity noted on CT by neurology    Carotid US- No plaque formation in the right internal carotid artery; No plaque formation in the left internal carotid artery.    Dyslipidemia    Lipid profile- , HDL 66    Follow up with PCP for possible starting statin     GERD - PPI PRN   * EGD in 2/2023: Grade A esophagitis, stomach polyp biopsied.      Severe febrile illness in [place], likely representing COVID in November - Patient states she was told she had a \"severe viral infection of her blood\" but not tested for Covid.  Subsequently tested positive the next day.  Since then she has noted bouts of nausea and vomiting.      Recent constipation with colitis on 12/2023.  Subsequent colonoscopy normal complicated by polypectomy bleeding.  Hospitalized for colonoscopy with cautery and clip.  * Hgb nl 15.1 on admission.     Globus sensation     reported globus sensation on/off for 1-2 months     SLP- reg diet     Xray esophagram ordered but cannot be done until Monday     also, VFSS on Monday vs outpatient           Diet: Regular Diet Adult    DVT Prophylaxis: Pneumatic Compression Devices  Vazquez Catheter: Not present  Lines: None     Cardiac Monitoring: ACTIVE order. Indication: Stroke, acute (48 hours)  Code Status: Full Code      Clinically Significant Risk Factors                                 Disposition Plan      Expected Discharge Date: 04/03/2023  -if no recurrent symptoms and cleared by Neurology         Lillie Handley MD  Hospitalist Service  [facility]  Securely message with [employer] (more info)  Text page via [facility] " [employer]   ______________________________________________________________________    Interval History    Feeling better today, reports some frontal headache 2-3/10  - no vision changes today, no word-finding difficulties  - no numbness/weakness  - no chest pain, no SOB   - discussed with bedside RN    Physical Exam   Vital Signs: Temp: 98.3  F (36.8  C) Temp src: Oral BP: (!) 149/73 Pulse: 63   Resp: 18 SpO2: 91 % O2 Device: None (Room air)    Weight: 0 lbs 0 oz    General: awake, alert, NAD  HEENT- head- normocephalic, atraumatic, PERRL  RESP: bilateral air entry, no wheezing, no rales, no crackles  CV- S1S2, RRR, no murmurs, no rubs  GI- abd- soft, nonT, nonD, BS present  SKIN-no rashes, no cyanosis  MSK- no joint deformities  Neuro: AAOX3, motor strength 5/5 bilateral upper and lower extremities, sensorial grossly intact, visual fields seems intact    Medical Decision Making       55 MINUTES SPENT BY ME on the date of service doing chart review, history, exam, documentation & further activities per the note.      Data         Imaging results reviewed over the past 24 hrs:   Recent Results (from the past 24 hour(s))   MR Brain w/o & w Contrast    Narrative    EXAM: MR BRAIN WITHOUT AND WITH CONTRAST  LOCATION: [facility]  DATE/TIME: 04/02/2023, 6:33 AM    INDICATION: Abnormal  MRI head, follow-up.  COMPARISON: Head MRI 03/31/2023.  CONTRAST: 6 mL Gadavist.  TECHNIQUE: Routine multiplanar multisequence head MRI without and with intravenous contrast.    FINDINGS:  Small area of leptomeningeal enhancement along the left parietal lobe is less conspicuous compared to the prior exam. Corresponding leptomeningeal T2 FLAIR hyperintensity is not significantly changed.    Background of mild generalized parenchymal volume loss is present with scattered white matter T2 hyperintensities which likely represent chronic small vessel ischemic change. No evidence of acute ischemia, hemorrhage, mass  effect, or hydrocephalus.    Marrow signal is within normal limits. Mild paranasal sinus mucosal thickening. The visualized tympanic and mastoid cavities are unremarkable.      Impression    IMPRESSION:  1.  Left parietal lobe leptomeningeal enhancement has improved, however, persistent corresponding leptomeningeal T2 FLAIR hyperintense signal is present. This is favored to represent focal infectious or inflammatory process. Leptomeningeal metastasis or   subacute infarct are considered to be less likely. CSF correlation would be typically recommended.

## 2023-04-02 NOTE — PROGRESS NOTES
Pt A&O x4, up x SBA. VSS. RA. CMS intact. s/s of headaches, R eye field cut, and  n/v, comes and goes. Takes Guillermina x2 and Zofran x1. Tele: NSR. EEG done today. Will have MRI tomorrow. On seizure precaution.

## 2023-04-03 ENCOUNTER — APPOINTMENT (OUTPATIENT)
Dept: GENERAL RADIOLOGY | Facility: CLINIC | Age: 68
DRG: 947 | End: 2023-04-03
Attending: INTERNAL MEDICINE
Payer: MEDICARE

## 2023-04-03 VITALS
RESPIRATION RATE: 18 BRPM | OXYGEN SATURATION: 96 % | TEMPERATURE: 98 F | SYSTOLIC BLOOD PRESSURE: 145 MMHG | DIASTOLIC BLOOD PRESSURE: 73 MMHG | HEART RATE: 66 BPM

## 2023-04-03 LAB
ALB CSF/SERPL: 9.5 RATIO
ALBUMIN CSF-MCNC: 43 MG/DL
ALBUMIN SERPL-MCNC: 4505 MG/DL
IGG CSF-MCNC: 3.7 MG/DL
IGG SERPL-MCNC: 895 MG/DL
IGG SYNTH RATE SER+CSF CALC-MRATE: <0 MG/D
IGG/ALB CLEAR SER+CSF-RTO: 0.43 RATIO
IGG/ALB CSF: 0.09 RATIO
OLIGOCLONAL BANDS CSF ELPH-IMP: ABNORMAL
OLIGOCLONAL BANDS CSF ELPH-IMP: NEGATIVE
OLIGOCLONAL BANDS CSF IEF: 0 BANDS
PATH REPORT.COMMENTS IMP SPEC: NORMAL
PATH REPORT.FINAL DX SPEC: NORMAL
PATH REPORT.GROSS SPEC: NORMAL
PATH REPORT.MICROSCOPIC SPEC OTHER STN: NORMAL
PATH REPORT.RELEVANT HX SPEC: NORMAL

## 2023-04-03 PROCEDURE — 74221 X-RAY XM ESOPHAGUS 2CNTRST: CPT

## 2023-04-03 PROCEDURE — 250N000013 HC RX MED GY IP 250 OP 250 PS 637: Performed by: PSYCHIATRY & NEUROLOGY

## 2023-04-03 PROCEDURE — 250N000013 HC RX MED GY IP 250 OP 250 PS 637: Performed by: RADIOLOGY

## 2023-04-03 PROCEDURE — 88108 CYTOPATH CONCENTRATE TECH: CPT | Mod: 26 | Performed by: PATHOLOGY

## 2023-04-03 PROCEDURE — 99239 HOSP IP/OBS DSCHRG MGMT >30: CPT | Performed by: STUDENT IN AN ORGANIZED HEALTH CARE EDUCATION/TRAINING PROGRAM

## 2023-04-03 RX ORDER — DIVALPROEX SODIUM 500 MG/1
500 TABLET, DELAYED RELEASE ORAL EVERY 12 HOURS
Qty: 60 TABLET | Refills: 3 | Status: SHIPPED | OUTPATIENT
Start: 2023-04-03

## 2023-04-03 RX ADMIN — ANTACID/ANTIFLATULENT 4 G: 380; 550; 10; 10 GRANULE, EFFERVESCENT ORAL at 09:44

## 2023-04-03 RX ADMIN — DIVALPROEX SODIUM 500 MG: 500 TABLET, DELAYED RELEASE ORAL at 10:07

## 2023-04-03 ASSESSMENT — ACTIVITIES OF DAILY LIVING (ADL)
ADLS_ACUITY_SCORE: 35

## 2023-04-03 NOTE — DISCHARGE SUMMARY
[facility]  Hospitalist Discharge Summary      Date of Admission:  3/31/2023  Date of Discharge:  4/3/2023  Discharging Provider: Rito Shaw MD  Discharge Service: Hospitalist Service    Discharge Diagnoses   As below    Follow-ups Needed After Discharge   Follow-up Appointments     Follow-up and recommended labs and tests       Follow up with primary care provider, Shawanda Doran, within 7 days for   hospital follow- up.  No follow up labs or test are needed. Recommend   discussion of starting statin.    Follow up with [facility] Dr. Epstein or other   provider. Call [phone] or request appointment online at   [email or web address]             Unresulted Labs Ordered in the Past 30 Days of this Admission     Date and Time Order Name Status Description    3/31/2023  5:49 PM Serum Collection (Accompanies: [id number] or [id number] CSF Testing) In process     3/31/2023  5:49 PM Oligoclonal Banding: In process     3/31/2023  5:49 PM Encephalopathy, Autoimmune Evaluation Cascade, Spinal Fluid, Adult: In process     3/31/2023  5:49 PM Oligoclonal banding CSF Tube 3 and Blood In process     3/31/2023  5:46 PM Cytology, non-gynecologic In process     3/31/2023  4:53 PM Cerebrospinal fluid Aerobic Bacterial Culture Routine Preliminary       These results will be followed up by neurology    Discharge Disposition   Discharged to home  Condition at discharge: Stable      Hospital Course   Izabella Concepcion is a 67 year old relatively healthy female who is admitted on 03/31/23 after presenting for episode of right-sided upper extremity weakness and numbness and word finding difficulties and admitted on 3/31/2023.      Abnormal leptomeningeal enhancement of the left parietal lobe sulci   Right upper extremity weakness, word finding difficulty and subjective right hemianopsia   * CT head- negative  * CTA- Patent arteries in the head and neck without vascular  "cutoff. No evidence of dissection. No aneurysm identified. No significant stenosis  * MRI brain- Focal abnormal leptomeningeal enhancement in the left parietal lobe  sulci. This is a nonspecific finding and can be seen in infectious, inflammatory, or neoplastic leptomeningeal processes. Subacute infarct is considered less likely    Code stroke called in ER    LP- showing 0 nucleated cells, normal glucose, elevated protein 60.3 (nl <45):    CSF neg for CMV, HSV type1 and 2, cryptococcus    Oligoclonal bands pending     General Neuro consult appreciated     EEG- no evidence of seizures    Gen Neuro aware  * Repeat MRI brain in 4/2- Left parietal lobe leptomeningeal enhancement has improved, however, persistent corresponding leptomeningeal T2 FLAIR hyperintense signal is present. This is favored to represent focal infectious or inflammatory process. Leptomeningeal metastasis or subacute infarct are considered to be less likely    Started on Depakote 500 mg po BID as per Neurology    PT/OT - no need identified    SLP - rec regular diet    Should follow up with Neurology as outpatient, patient to schedule. She felt at her baseline prior to discharge and felt her symptoms were much improved by depakote.      Elevated blood pressure - Initially 140's > Up to 180 post procedure, suspect secondary to stress. No history of HTN, not on anti-hypertensive.      Left ICA irregularity noted on CT by neurology  * Carotid US- No plaque formation in the right internal carotid artery; No plaque formation in the left internal carotid artery.     Dyslipidemia  *Lipid profile- , HDL 66  - Follow up with PCP for possible starting statin     GERD - PPI PRN   * EGD in 2/2023: Grade A esophagitis, stomach polyp biopsied.      Severe febrile illness in [place], likely representing COVID in November - Patient states she was told she had a \"severe viral infection of her blood\" but not tested for Covid.  Subsequently tested positive the " next day.  Since then she has noted bouts of nausea and vomiting.      Recent constipation with colitis on 12/2023.  Subsequent colonoscopy normal complicated by polypectomy bleeding.  Hospitalized for colonoscopy with cautery and clip.  * Hgb nl 15.1 on admission.      Globus sensation  Small sliding hiatal hernia  * reported globus sensation on/off for 1-2 months  - SLP- reg diet  - XR esophagram 4/3 with small sliding hiatal hernia and small amount of reflux  - continue PPI    Severe malnutrition    Consultations This Hospital Stay   NEUROLOGY IP STROKE CONSULT  NEUROLOGY IP CONSULT  PATIENT LEARNING CENTER IP CONSULT  SPEECH LANGUAGE PATH ADULT IP CONSULT  PHARMACY IP CONSULT  PHARMACY IP CONSULT  PHARMACY IP CONSULT  PHYSICAL THERAPY ADULT IP CONSULT  OCCUPATIONAL THERAPY ADULT IP CONSULT  REHAB ADMISSIONS LIAISON IP CONSULT  CARE MANAGEMENT / SOCIAL WORK IP CONSULT    Code Status   Full Code    Time Spent on this Encounter   I, Rito Shaw MD, personally saw the patient today and spent greater than 30 minutes discharging this patient.       Rito Shaw MD  [facility]  [address] [facility] [address]  Phone: [phone]  Fax: [phone]  ______________________________________________________________________    Physical Exam   Vital Signs: Temp: 97.7  F (36.5  C) Temp src: Oral BP: 125/76 Pulse: 64   Resp: 18 SpO2: 95 % O2 Device: None (Room air)    Weight: 0 lbs 0 oz  Constitutional: Awake, alert, cooperative, no apparent distress  Respiratory: Clear to auscultation bilaterally, no crackles or wheezing  Cardiovascular: Regular rate and rhythm, normal S1 and S2, and no murmur noted  GI: Normal bowel sounds, soft, non-distended, non-tender  Skin/Integumen: No rashes, no cyanosis, no edema  Other:          Primary Care Physician   Shawanda Doran    Discharge Orders      Reason for your hospital stay    You were in the hospital due to headache and neuro  changes.     Follow-up and recommended labs and tests     Follow up with primary care provider, Shawanda Doran, within 7 days for hospital follow- up.  No follow up labs or test are needed. Recommend discussion of starting statin.    Follow up with [facility] Dr. Epstein or other provider. Call [phone] or request appointment online at [email or web address]     Activity    Your activity upon discharge: activity as tolerated     Diet    Follow this diet upon discharge: Orders Placed This Encounter      Regular Diet Adult       Significant Results and Procedures   Most Recent 3 CBC's:Recent Labs   Lab Test 03/31/23  1212 01/24/23  0937 01/08/23  0755 01/07/23  0926 01/05/23  2151 01/05/23 2017   WBC 8.1  --   --  5.8  --  9.5   HGB 15.1 12.5 10.3* 10.8*   < > 12.6   MCV 85  --   --  88  --  89     --   --  280  --  354    < > = values in this interval not displayed.     Most Recent 3 BMP's:Recent Labs   Lab Test 04/02/23  0535 04/01/23  1128 04/01/23  0603 03/31/23  1959 03/31/23  1212 01/07/23  0926 01/05/23 2017   NA  --   --   --   --  140 142 143   POTASSIUM  --   --   --   --  3.7 3.4 3.7   CHLORIDE  --   --   --   --  104 107 110*   CO2  --   --   --   --  23 25 22   BUN  --   --   --   --  9.1 3* 9   CR  --   --   --   --  0.72 0.54 0.69   ANIONGAP  --   --   --   --  13 10 11   BRITTANY  --   --   --   --  10.1 8.9 9.3   GLC 91 141* 89   < > 136* 78 165*    < > = values in this interval not displayed.   ,   Results for orders placed or performed during the hospital encounter of 03/31/23   CT Head w/o Contrast    Narrative    CT SCAN OF THE HEAD WITHOUT CONTRAST   3/31/2023 12:24 PM     HISTORY: Code stroke to evaluate for potential thrombolysis and  thrombectomy. Word finding difficulty.    TECHNIQUE:  Axial images of the head and coronal reformations without  IV contrast material. Radiation dose for this scan was reduced using  automated exposure control,  adjustment of the mA and/or kV according  to patient size, or iterative reconstruction technique.    COMPARISON: None.    FINDINGS: There is no evidence of intracranial hemorrhage, mass, acute  infarct or anomaly. The ventricles are normal in size, shape and  configuration. The brain parenchyma and subarachnoid spaces are  normal.     Mild mucosal thickening in the inferior left maxillary sinus. The bony  calvarium and bones of the skull base appear intact.       Impression    IMPRESSION:  No evidence of acute intracranial hemorrhage, mass, or  herniation.      NORA GUTIERREZ MD         SYSTEM ID:  [id number]   CTA Head Neck with Contrast    Narrative    CT ANGIOGRAM OF THE HEAD AND NECK WITH CONTRAST  3/31/2023 12:24 PM     HISTORY: Code Stroke. Evaluate for potential thrombolysis and  thrombectomy. Word finding difficulty.    TECHNIQUE:  CT angiography with an injection of 75 mL Isovue 370 IV  with scans through the head and neck. Images were transferred to a  separate 3-D workstation where multiplanar reformations and 3-D images  were created. Estimates of carotid stenoses are made relative to the  distal internal carotid artery diameters except as noted. Radiation  dose for this scan was reduced using automated exposure control,  adjustment of the mA and/or kV according to patient size, or iterative  reconstruction technique.      COMPARISON: None.     CT HEAD FINDINGS: No contrast enhancing lesions. Cerebral blood flow  is grossly normal.     CT ANGIOGRAM HEAD FINDINGS:  The major intracranial arteries including  the proximal branches of the anterior cerebral, middle cerebral, and  posterior cerebral arteries appear patent without vascular cutoff. No  aneurysm identified. No significant stenosis. Venous circulation is  unremarkable.     CT ANGIOGRAM NECK FINDINGS: Normal origin of the great vessels from  the aortic arch.     Right carotid artery: The right common and internal carotid arteries  are patent. No  significant stenosis or atherosclerotic disease in the  carotid artery.     Left carotid artery: The left common and internal carotid arteries are  patent. No significant stenosis or atherosclerotic disease in the  carotid artery. Images are degraded by motion artifact which limits  evaluation particularly of the proximal left internal carotid artery.    Vertebral arteries: Vertebral arteries are patent without evidence of  dissection. No significant stenosis.     Other findings: Heterogeneous thyroid gland containing multiple  nodules, the largest on the left measuring up to 1.1 cm. These  typically would not require further follow-up in a patient of this  age.       Impression    IMPRESSION: Patent arteries in the head and neck without vascular  cutoff. No evidence of dissection. No aneurysm identified. No  significant stenosis.     Results discussed with Jesse Mcdaniel at 12:33 PM on 3/31/2023.     NORA GUTIERREZ MD         SYSTEM ID:  [id number]   MR Brain w/o & w Contrast    Narrative    MRI BRAIN WITHOUT AND WITH CONTRAST  3/31/2023 3:37 PM     HISTORY: Word finding difficulty, transient right upper extremity  weakness and numbness.    TECHNIQUE: Multiplanar, multisequence MRI of the brain without and  with 7 mL Gadavist     COMPARISON: None.     FINDINGS: No restricted diffusion to suggest acute infarct. No mass  effect or midline shift. No evidence of acute intracranial hemorrhage.  Ventricular size is within normal limits without evidence of  hydrocephalus. A few patchy periventricular white matter T2  hyperintensities which are nonspecific, but most likely related to  chronic microvascular ischemic disease.    Region of abnormal leptomeningeal enhancement in the left parietal  lobe sulci (series 1001 image 18 and series 1101 image 25). There is  associated failure of T2 FLAIR suppression in that parietal lobe sulci  (series 801 image 19). No definite suspicious enhancing parenchymal  lesions.     Mild polypoid  mucosal thickening in the paranasal sinuses.       Impression    IMPRESSION:    1. Focal abnormal leptomeningeal enhancement in the left parietal lobe  sulci. This is a nonspecific finding and can be seen in infectious,  inflammatory, or neoplastic leptomeningeal processes. Subacute infarct  is considered less likely. Consider lumbar puncture.   2. No other acute intracranial abnormality appreciated. No acute  infarct, hemorrhage, or herniation.  3. A few nonspecific white matter changes most likely due to chronic  microvascular ischemic disease.    Results discussed with Jesse Mcdaniel at 4:04 PM on 3/31/2023.     NORA GUTIERREZ MD         SYSTEM ID:  [id number]   XR Lumbar Puncture Spinal Tap Diag    Narrative    FLUOROSCOPICALLY-GUIDED LUMBAR PUNCTURE  3/31/2023 6:46 PM    INDICATION: 67-year-old patient with MRI brain revealing leptomeningeal enhancement. Lumbar puncture has been requested to obtain cerebrospinal fluid (CSF) for analysis.    CONSENT: The procedure and its indications, major risks, benefits, and alternatives were discussed. Risks include, but are not limited to, pain, headache, hemorrhage, infection, nerve damage, spinal cord damage, and allergic reaction. Understanding was   acknowledged and informed consent was obtained.    FLUOROSCOPIC TIME: 0.1 minutes   AIR KERMA: 1 mGy  DAP: 4.8 microGym2    NUMBER OF IMAGES: 1    ESTIMATED BLOOD LOSS: Trace    PROCEDURE: The patient was placed in prone position upon the fluoroscopic table. The skin of the back was prepped and draped in sterile fashion.  Using fluoroscopic guidance, the L5 - S1 level was localized. The skin was infiltrated with 1% lidocaine.   Using direct fluoroscopic visualization, a 20 gauge spinal needle was advanced into the thecal sac at the L5-S1 level. 15 mL of clear CSF was passively obtained in total. This was divided between four labeled tubes. The needle was then removed. A   dressing was applied. The procedure appeared well-tolerated  and was without apparent complication.      Impression    CONCLUSION:   Fluoroscopically-guided lumbar puncture yields 15 mL of clear cerebrospinal fluid.   US Carotid Bilateral    Narrative    EXAM: US CAROTID BILATERAL  LOCATION: [facility]  DATE/TIME: 3/31/2023 9:16 PM    INDICATION: follow up for carotid irregularity per neurology  COMPARISON: CTA 03/31/2023  TECHNIQUE: Duplex exam performed utilizing 2D gray-scale imaging, Doppler interrogation with color-flow and spectral waveform analysis. The percent diameter stenosis is determined using NASCET criteria and Society of Radiologists in Ultrasound Consensus   Criteria.    FINDINGS:    RIGHT: No plaque at the bifurcation. The peak systolic velocity in the ICA is less than 125 cm/sec. Normal velocities in the ECA. Antegrade flow within the vertebral artery.     LEFT: No plaque at the bifurcation. The peak systolic velocity in the ICA is less than 125 cm/sec. Normal velocities in the ECA. Antegrade flow within the vertebral artery.    VELOCITY CHART:  CCA   Right: 93/17 cm/s   Left: 109/27 cm/s  ICA   Right: 108/32 cm/s   Left: 112/44 cm/s  ECA   Right: 122/22 cm/s   Left: 140/27 cm/s  ICA/CCA PSV Ratio   Right: 1.68   Left: 1.4      Impression    IMPRESSION:  1.  No plaque formation in the right internal carotid artery.  2.  No plaque formation in the left internal carotid artery.  3.  Flow within the vertebral arteries is antegrade.   XR Esophagram    Narrative    XR ESOPHAGRAM DOUBLE CONTRAST 4/3/2023 9:46 AM     HISTORY: Globus sensation  TECHNIQUE: 0.6 minutes fluoroscopy. 5 spot images.  COMPARISON: None    FINDINGS: Normal esophageal peristalsis. No stricture, mass lesion, or  mucosal abnormality. There is a small sliding hiatal hernia. There is  small amount of gastroesophageal reflux in the recumbent position.      Impression    IMPRESSION:  1.  Small sliding hiatal hernia with small amount of gastroesophageal  reflux.  2.   Otherwise normal esophagram.   MR Brain w/o & w Contrast    Narrative    EXAM: MR BRAIN WITHOUT AND WITH CONTRAST  LOCATION: [facility]  DATE/TIME: 04/02/2023, 6:33 AM    INDICATION: Abnormal  MRI head, follow-up.  COMPARISON: Head MRI 03/31/2023.  CONTRAST: 6 mL Gadavist.  TECHNIQUE: Routine multiplanar multisequence head MRI without and with intravenous contrast.    FINDINGS:  Small area of leptomeningeal enhancement along the left parietal lobe is less conspicuous compared to the prior exam. Corresponding leptomeningeal T2 FLAIR hyperintensity is not significantly changed.    Background of mild generalized parenchymal volume loss is present with scattered white matter T2 hyperintensities which likely represent chronic small vessel ischemic change. No evidence of acute ischemia, hemorrhage, mass effect, or hydrocephalus.    Marrow signal is within normal limits. Mild paranasal sinus mucosal thickening. The visualized tympanic and mastoid cavities are unremarkable.      Impression    IMPRESSION:  1.  Left parietal lobe leptomeningeal enhancement has improved, however, persistent corresponding leptomeningeal T2 FLAIR hyperintense signal is present. This is favored to represent focal infectious or inflammatory process. Leptomeningeal metastasis or   subacute infarct are considered to be less likely. CSF correlation would be typically recommended.             Discharge Medications   Current Discharge Medication List      START taking these medications    Details   divalproex sodium delayed-release (DEPAKOTE) 500 MG DR tablet Take 1 tablet (500 mg) by mouth every 12 hours  Qty: 60 tablet, Refills: 3    Associated Diagnoses: Peripheral visual field defect, right         CONTINUE these medications which have NOT CHANGED    Details   omeprazole (PRILOSEC) 20 MG DR capsule Take 20 mg by mouth daily as needed           Allergies   Allergies   Allergen Reactions     Blood Transfusion Related  (Informational Only) Other (See Comments)     Patient has a history of a clinically significant antibody against RBC antigens.  A delay in compatible RBCs may occur.     Dogs Itching     Nickel      Other reaction(s): Other (see comments)  Unknown.      Nickel (non food)     Pollen Extract      Other reaction(s): Other (see comments)  unknown     Vigamox [Moxifloxacin] Swelling

## 2023-04-03 NOTE — PLAN OF CARE
Goal Outcome Evaluation:       Patient is alert and oriented X4. VSS on RA with good sat. SBA. IV access removed. Neuro intact. AVS reviewed with the patient, all questions were answered and patient verbalized understanding. Patient advised to follow up with PCP within 1 weeks and fall precautions and safety follow.Belongings returned. Patient discharge home.

## 2023-04-03 NOTE — CONSULTS
"CLINICAL NUTRITION SERVICES  -  ASSESSMENT NOTE    Recommendations Ordered by Registered Dietitian (RD):   - Reviewed protein foods and encouraged intake  - Ensure BID between meals    Malnutrition:   % Weight Loss:  > 10% in 6 months (severe malnutrition)- reported   % Intake:  </= 75% for >/= 1 month (severe malnutrition)  Subcutaneous Fat Loss:  Orbital region mild depletion and Upper arm region mild depletion  Muscle Loss:  Temporal region mild depletion and Clavicle bone region mild depletion  Fluid Retention:  None noted    Malnutrition Diagnosis: Severe malnutrition  In Context of:  Acute illness or injury     REASON FOR ASSESSMENT  Izabella Concepcion is a 67 year old female seen by Registered Dietitian for Nutrition Admission Risk Screen Received -   Have you recently lost weight without trying ? - \"unsure\"  Have you been eating poorly due to a decreased appetite ?- \"no\"    NUTRITION HISTORY  - Information obtained from chart and patient report.  - Recent unknown infection while abroad.  - Since traveling in November pt hasn't felt well, c/o constipation, lack of interest in eating, weight loss, loss of lean body mass.  - She ate a Poke bowl last week and reports it was the first thing that tasted really good since November.  - Reported a 20# wt loss.    - Izabella noted that many of her current GI symptoms are beginning to resolve.     CURRENT NUTRITION ORDERS  Diet Order:     Regular     Current Intake/Tolerance:  Tolerating small meals.     NUTRITION FOCUSED PHYSICAL ASSESSMENT FOR DIAGNOSING MALNUTRITION)  Yes              Observed:    Muscle wasting (refer to documentation in Malnutrition section) and Subcutaneous fat loss (refer to documentation in Malnutrition section)    Obtained from Chart/Interdisciplinary Team:  Globus sensation on /off for 1-2 months.    ANTHROPOMETRICS  Height: 5' 3\"  Weight: no current wt on file   There is no height or weight on file to calculate BMI.  IBW: 52.3 kg   Weight History: " No wt on file for 2 months. Pt reported 20# loss since November ~12%?  Wt Readings from Last 10 Encounters:   01/24/23 68.5 kg (151 lb)   12/26/22 69.4 kg (153 lb)   07/18/22 75.3 kg (166 lb)   07/14/22 75.8 kg (167 lb 3.2 oz)   03/08/22 77.6 kg (171 lb)   10/05/21 72.8 kg (160 lb 8 oz)   07/23/21 68 kg (150 lb)   02/09/20 68 kg (150 lb)   01/23/19 73 kg (160 lb 14.4 oz)   11/07/18 71.7 kg (158 lb)       LABS  Labs reviewed  Elevated cholesterol, LDL    MEDICATIONS  Medications reviewed    ASSESSED NUTRITION NEEDS PER APPROVED PRACTICE GUIDELINES:  No Current Wt on File   Estimated Energy Needs: 25-30 kcal/kg  Justification: maintenance  Estimated Protein Needs: 1.2-1.5 grams protein/kg   Justification: maintenance and Repletion  Estimated Fluid Needs: 1 mL/kcal   Justification: maintenance    MALNUTRITION:  % Weight Loss:  > 10% in 6 months (severe malnutrition)- reported   % Intake:  </= 75% for >/= 1 month (severe malnutrition)  Subcutaneous Fat Loss:  Orbital region mild depletion and Upper arm region mild depletion  Muscle Loss:  Temporal region mild depletion and Clavicle bone region mild depletion  Fluid Retention:  None noted    Malnutrition Diagnosis: Severe malnutrition  In Context of:  Acute illness or injury    NUTRITION DIAGNOSIS:  Malnutrition related to poor appetite, lack of desire to eat, constipation as evidenced by weight loss of up to 20# in 6 months or less, pt reported very small portion sizes.     NUTRITION INTERVENTIONS  Recommendations / Nutrition Prescription  Regular diet  Ensure BID    Implementation  Nutrition education: Provided education on protein sources & encouraged intake   Medical Food Supplement: as above    Nutrition Goals  Intake of at least 75% nutritionally adequate meals and supplements.    MONITORING AND EVALUATION:  Progress towards goals will be monitored and evaluated per protocol and Practice Guidelines    Vashti Chaudhry RD, LD  [facility] [place], [facility], [facility], Ortho  Spine  Pager: [phone]  Weekend Pager: [phone]

## 2023-04-03 NOTE — PROGRESS NOTES
Alert and oriented X4. VSS on RA. Independent in room. seizure precaution maintained. Denies pain. Neuro intact. PIV SL. Tele w/ NS.  Voiding well per bathroom, no BM this shift. Possible home discharge

## 2023-04-03 NOTE — PROGRESS NOTES
MD Notification    Notified Person: MD    Notified Person Name:Lidia Olivo    Notification Date/Time:4/2/23 @ 2207    Notification Interaction:Amicom    Purpose of Notification:LD *0715  pt is asking if she can get Miralax for constipation. Thanks    Esa SANTOS [id number]    Orders Received:    Comments:

## 2023-04-04 ENCOUNTER — PATIENT OUTREACH (OUTPATIENT)
Dept: NURSING | Facility: CLINIC | Age: 68
End: 2023-04-04
Payer: MEDICARE

## 2023-04-04 ASSESSMENT — ACTIVITIES OF DAILY LIVING (ADL): DEPENDENT_IADLS:: INDEPENDENT

## 2023-04-04 NOTE — LETTER
[facility]  Patient Centered Plan of Care  About Me:        Patient Name:  Izabella Concepcion    YOB: 1955  Age:         67 year old   Elizabeth MRN:    [id number] Telephone Information:  Home Phone [phone]   Mobile [phone]       Address:  [address] [place] [address]  [place] [address] Email address:  [email or web address]      Emergency Contact(s)    Name Relationship Lgl Grd Work Phone Home Phone Mobile Phone   1. JACKIE NAPOLES* Daughter No  [phone]    2. VALORIE CONCEPCION  Daughter No  [phone]    3. MIKAEL CONCEPCION Brother    [phone]           Primary language:  English     needed? No   [facility] [employer] [facility]:  [phone] op. 1  Other communication barriers:None    Preferred Method of Communication:  Judy  Current living arrangement: I live alone    Mobility Status/ Medical Equipment: Independent    Health Maintenance  Health Maintenance Reviewed: Due/Overdue   Health Maintenance Due   Topic Date Due     ASTHMA ACTION PLAN  Never done     COVID-19 Vaccine (1) Never done     Pneumococcal Vaccine: 65+ Years (1 - PCV) Never done     HEPATITIS C SCREENING  Never done     ZOSTER IMMUNIZATION (1 of 2) Never done     LUNG CANCER SCREENING  Never done     INFLUENZA VACCINE (1) 09/01/2022     MAMMO SCREENING  03/23/2023             My Access Plan  Medical Emergency 911   Primary Clinic Line [facility] [phone]   24 Hour Appointment Line [phone] or  [phone]) (toll-free)   24 Hour Nurse Line [phone] (toll-free)   Preferred Urgent Care [facility], [phone]     Preferred Hospital [facility]  [phone]     Preferred Pharmacy [facility] [place], [place] - [address]     Behavioral Health Crisis Line The National Suicide Prevention Lifeline at 1-969.566.6264 or Text/Call 088     My Care Team Members  Patient Care Team         Relationship Specialty  Notifications Start End    Shawanda Doran DO PCP - General Internal Medicine  7/12/21     Phone: [phone] Fax: [phone] [address] [place] [address]    Pam Lester, PsyD  Psychology  12/7/17     Shawanda Doran DO Assigned PCP   8/1/21     Phone: [phone] Fax: [phone] [address] [place] [address]    Nallely Kearney DO Assigned Heart and Vascular Provider   7/23/22     Phone: [phone] Fax: [phone] [address] [place] [address]    Rosi Lane, RN Lead Care Coordinator  Admissions 1/11/23               My Care Plans  Self Management and Treatment Plan  Care Plan  Care Plan: Prevent re-hospitalizations and Emergency Room visits       Problem: Lifestyle choices       Goal: Prevent re-hospitalizations and ER visits       Start Date: 1/16/2023 Expected End Date: 7/3/2023    This Visit's Progress: No change Recent Progress: On track    Note:     Barriers: Recent discharge from hospital  Strengths: Strong advocate for self  Patient expressed understanding of goal: Yes  Action steps to achieve this goal:  1. I will utilize the Urgent Care at [place] or [facility]  2. I will contact my care team with questions, concerns, support needs   3. I will use the clinic as a resource, and I understand I can contact my clinic with 24/7 after hours services available                               Action Plans on File:     Advance Care Plans/Directives Type:   -- (Full Code)      My Medical and Care Information  Problem List   Patient Active Problem List   Diagnosis     Moderate persistent asthma without complication     Statin intolerance     Femur fracture, right (H)     Osteopenia, unspecified location     Generalized anxiety disorder     Elevated BP without diagnosis of hypertension     Hyperlipidemia     Family history of early CAD     Compression fracture     Allergic rhinitis     Hematochezia     Syncope     Anemia due to blood loss,  acute     Colonoscopy causing post-procedural bleeding     Weakness of right upper extremity     Right upper extremity numbness     Word finding difficulty     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  A delay in compatible RBCs may occur.     Dogs Itching     Nickel      Other reaction(s): Other (see comments)  Unknown.      Nickel (non food)     Pollen Extract      Other reaction(s): Other (see comments)  unknown     Vigamox [Moxifloxacin] Swelling     Current Outpatient Medications   Medication     divalproex sodium delayed-release (DEPAKOTE) 500 MG DR tablet     omeprazole (PRILOSEC) 20 MG DR capsule     No current facility-administered medications for this visit.     Care Coordination Start Date: 1/10/2023   Frequency of Care Coordination: monthly     Form Last Updated: 04/04/2023

## 2023-04-04 NOTE — PROGRESS NOTES
Clinic Care Coordination Contact    Clinic Care Coordination Contact  OUTREACH with Post Discharge Assessment    Referral Information:  Referral Source: IP Handoff    Primary Diagnosis: Other (include Comment box) (s/p Colonoscopy 1/5/2023 -solitary ulcer. post procedural GI Bleed likely from polypectomy site)    Chief Complaint   Patient presents with     Clinic Care Coordination - Post Hospital     Clinic Care Coordination - Follow-up      Universal Utilization:   [facility]  Clinic Utilization:  [facility] [place]   Difficulty keeping appointments:: No  Compliance Concerns: No  No-Show Concerns: No  No PCP office visit in Past Year: No  Utilization    Hospital Admissions  2             ED Visits  2             No Show Count (past year)  1                Current as of: 4/3/2023 10:27 AM            Clinical Concerns:  Current Medical Concerns:  Recent discharge from hospital    Current Behavioral Concerns: None    Education Provided to patient:   RNCC called and spoke with patient/caregiver; introduced self, discussed role of Care Coordination and explained reason for call     Pain  Pain (GOAL):: No  Health Maintenance Reviewed: Due/Overdue   Health Maintenance Due   Topic Date Due     ASTHMA ACTION PLAN  Never done     COVID-19 Vaccine (1) Never done     Pneumococcal Vaccine: 65+ Years (1 - PCV) Never done     HEPATITIS C SCREENING  Never done     ZOSTER IMMUNIZATION (1 of 2) Never done     LUNG CANCER SCREENING  Never done     INFLUENZA VACCINE (1) 09/01/2022     MAMMO SCREENING  03/23/2023     Clinical Pathway: None    Admission:    Admission Date: 03/31/23   Reason for Admission: Abnormal leptomeningeal enhancement of left parietal lobe, difficulty with word finding  Discharge:   Discharge Date: 04/03/23  Discharge Diagnosis: Abnormal leptomeningeal enhancement of left parietal lobe, difficulty with word finding    Enrollment  Outreach Frequency: monthly    Discharge  "Assessment  How are you doing now that you are home?: \"i'm ok\"  How are your symptoms? (Red Flag symptoms escalate to triage hotline per guidelines): Improved  Do you feel your condition is stable enough to be safe at home until your provider visit?: Yes  Does the patient have their discharge instructions? : Yes  Does the patient have questions regarding their discharge instructions? : No  Were you started on any new medications or were there changes to any of your previous medications? : Yes  Does the patient have all of their medications?: Yes  Do you have questions regarding any of your medications? : No  Do you have all of your needed medical supplies or equipment (DME)?  (i.e. oxygen tank, CPAP, cane, etc.): Yes  Discharge follow-up appointment scheduled within 14 calendar days? : Yes  Discharge Follow Up Appointment Date: 04/07/23  Discharge Follow Up Appointment Scheduled with?: Primary Care Provider    Post-op (Clinicians Only)  Did the patient have surgery or a procedure: No  Fever: No  Chills: Yes (x1 for 30 seconds)  Eating & Drinking: eating and drinking without complaints/concerns  PO Intake: regular diet  Additional Symptoms: decreased appetite (Denies nausea/vomiting; has lost 20 pounds and per patient, \"I could lose another 15-20 pounds\")  Bowel Function: normal  Date of last BM: 04/03/23  Urinary Status: voiding without complaint/concerns    Medication Management:  Medication review status: Medications reviewed and no changes reported per patient.        Current Outpatient Medications   Medication     divalproex sodium delayed-release (DEPAKOTE) 500 MG DR tablet     omeprazole (PRILOSEC) 20 MG DR capsule     No current facility-administered medications for this visit.     Functional Status:  Dependent ADLs:: Independent  Dependent IADLs:: Independent  Bed or wheelchair confined:: No  Mobility Status: Independent    Living Situation:  Current living arrangement:: I live alone  Type of residence:: " Other (Condo)    Lifestyle & Psychosocial Needs:    Social Determinants of Health     Tobacco Use: Medium Risk (1/24/2023)    Patient History      Smoking Tobacco Use: Former      Smokeless Tobacco Use: Never      Passive Exposure: Not on file   Alcohol Use: Not At Risk (1/11/2023)    AUDIT-C      Frequency of Alcohol Consumption: 2-4 times a month      Average Number of Drinks: 1 or 2      Frequency of Binge Drinking: Never   Financial Resource Strain: Low Risk  (1/11/2023)    Overall Financial Resource Strain (CARDIA)      Difficulty of Paying Living Expenses: Not hard at all   Food Insecurity: No Food Insecurity (1/11/2023)    Hunger Vital Sign      Worried About Running Out of Food in the Last Year: Never true      Ran Out of Food in the Last Year: Never true   Transportation Needs: No Transportation Needs (1/11/2023)    PRAPARE - Transportation      Lack of Transportation (Medical): No      Lack of Transportation (Non-Medical): No   Physical Activity: Sufficiently Active (1/11/2023)    Exercise Vital Sign      Days of Exercise per Week: 3 days      Minutes of Exercise per Session: 90 min   Stress: No Stress Concern Present (1/11/2023)    [place] [employer] - Occupational Stress Questionnaire      Feeling of Stress : Only a little   Social Connections: Socially Isolated (1/11/2023)    Social Connection and Isolation Panel [NHANES]      Frequency of Communication with Friends and Family: More than three times a week      Frequency of Social Gatherings with Friends and Family: Once a week      Attends Yarsani Services: Never      Active Member of Clubs or Organizations: No      Attends Club or Organization Meetings: Not on file      Marital Status:    Intimate Partner Violence: Not on file   Depression: Not at risk (1/24/2023)    PHQ-2      PHQ-2 Score: 1   Housing Stability: Low Risk  (1/11/2023)    Housing Stability Vital Sign      Unable to Pay for Housing in the Last Year: No       Number of Places Lived in the Last Year: 1      Unstable Housing in the Last Year: No     Diet:: Regular  Inadequate nutrition (GOAL):: No  Tube Feeding: No  Inadequate activity/exercise (GOAL):: No  Significant changes in sleep pattern (GOAL): No  Transportation means:: Regular car     Pentecostalism or spiritual beliefs that impact treatment:: No  Mental health DX:: No  Mental health management concern (GOAL):: No  Chemical Dependency Status: No Current Concerns  Chemical Dependency Management:  (NA)  Informal Support system:: Children, Family, Friends      Resources and Interventions:  Current Resources:   Community Resources: None  Supplies Currently Used at Home: None  Equipment Currently Used at Home: none  Employment Status: employed part-time, retired    Advance Care Plan/Directive  Advanced Care Plans/Directives on file:: No  Type Advanced Care Plans/Directives:  (Full Code)  Advanced Care Plan/Directive Status: Considering Options    Referrals Placed: None     Care Plan:   Care Plan: Prevent re-hospitalizations and Emergency Room visits     Problem: Lifestyle choices     Goal: Prevent re-hospitalizations and ER visits     Start Date: 1/16/2023 Expected End Date: 7/3/2023    This Visit's Progress: No change Recent Progress: On track    Note:     Barriers: Recent discharge from hospital  Strengths: Strong advocate for self  Patient expressed understanding of goal: Yes  Action steps to achieve this goal:  1. I will utilize the Urgent Care at [place] or [facility]  2. I will contact my care team with questions, concerns, support needs   3. I will use the clinic as a resource, and I understand I can contact my clinic with 24/7 after hours services available                      Patient/Caregiver understanding: Yes    Outreach Frequency: monthly  Future Appointments              In 3 days Shawanda Doran DO [facility] YOVANNY Srivastava        Plan:   -Patient will contact the care team with questions,  concerns, support needs   -Patient will use the clinic as a resource and understands (s)he can contact the [facility] with 24/7 after hours services available  -Care Coordinator will remain available as needed  -RNCC will follow up in one month for follow up appointments/recommendations and goal progression     Rosi Lane RN, BSN, PHN  Primary Care / Care Coordinator   [facility]  E-[email or web address]   [phone]

## 2023-04-05 LAB
BACTERIA CSF CULT: NO GROWTH
GRAM STAIN RESULT: NORMAL
GRAM STAIN RESULT: NORMAL

## 2023-04-06 NOTE — PROGRESS NOTES
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  Has follow-up with neuro in a few weeks, keep appt as planned  Continue depakote     Hyperlipidemia, unspecified hyperlipidemia type  Statin intolerance  Risks of HLD reviewed  Continue cardio care surveillance yearly due to family hx        Shawanda Doran, DO  [facility] [place] Parekh is a 67 year old, presenting for the following health issues:  HPI         4/4/2023     9:26 AM   Post Discharge Outreach   Admission Date 3/31/2023   Reason for Admission Abnormal leptomeningeal enhancement of left parietal lobe, difficulty with word finding   Discharge Date 4/3/2023   Discharge Diagnosis Abnormal leptomeningeal enhancement of left parietal lobe, difficulty with word finding   How are you doing now that you are home? \"i'm ok\"   How are your symptoms? (Red Flag symptoms escalate to triage hotline per guidelines) Improved   Do you feel your condition is stable enough to be safe at home until your provider visit? Yes   Does the patient have their discharge instructions?  Yes   Does the patient have questions regarding their discharge instructions?  No   Were you started on any new medications or were there changes to any of your previous medications?  Yes   Does the patient have all of their medications? Yes   Do you have questions regarding any of your medications?  No   Do you have all of your needed medical supplies or equipment (DME)?  (i.e. oxygen tank, CPAP, cane, etc.) Yes   Discharge follow-up appointment scheduled within 14 calendar days?  Yes   Discharge Follow Up Appointment Date 4/7/2023   Discharge Follow Up Appointment Scheduled with? Primary Care Provider     Hospital Follow-up Visit:    Hospital/Nursing Home/IP Rehab Facility: [facility]  Date of Admission: 3/31/2023  Date of Discharge: 4/3/2023  Reason(s) for Admission: headache and neuro changes.       Was your hospitalization related " "to COVID-19? No   Problems taking medications regularly:  None  Medication changes since discharge: None  Problems adhering to non-medication therapy:  None    Summary of hospitalization:  [facility] hospital discharge summary reviewed  Diagnostic Tests/Treatments reviewed.  Follow up needed:       Follow-up Appointments       Follow-up and recommended labs and tests         Follow up with primary care provider, Shawanda Doran, within 7 days for    hospital follow- up.      No follow up labs or test are needed. Recommend    discussion of starting statin.    Follow up with [facility] Dr. Epstein or other    provider. Call [phone] or request appointment online at    [email or web address]        Hospital follow-up   Depakote twice a day. \"Feels awesome\"  Notices when it starts wearing off can feel a bit dizzy but once kicks in and takes next dose feels great  No cp/sob  A week ago turned corner with digestive concerns, bowels working well, regulated. Not requiring mirlax.   Has appt with neuro in couple of weeks-[facility]  Sleep issues: Trazodone intolerance, ambien-sleepwalking; wants to work on sleep hygiene and rhythm; slept well last night  Tried three statins for her HLD in the past-1.5 weeks couldn't walk after first statin, digestive system shut down after second. Sees cardio yearly        Review of Systems   Constitutional, HEENT, cardiovascular, pulmonary, gi and gu systems are negative, except as otherwise noted.      Objective    /81 (BP Location: Right arm, Patient Position: Sitting, Cuff Size: Adult Regular)   Pulse 72   Temp 98  F (36.7  C) (Oral)   Resp 18   Ht 1.6 m (5' 3\")   Wt 66.9 kg (147 lb 8 oz)   SpO2 98%   BMI 26.13 kg/m    Body mass index is 26.13 kg/m .  Physical Exam     GENERAL APPEARANCE: AAOx3, no distress. Well developed.    PSYCH: appropriate mood and affect.                   "

## 2023-04-07 ENCOUNTER — OFFICE VISIT (OUTPATIENT)
Dept: FAMILY MEDICINE | Facility: CLINIC | Age: 68
End: 2023-04-07
Payer: MEDICARE

## 2023-04-07 VITALS
DIASTOLIC BLOOD PRESSURE: 81 MMHG | RESPIRATION RATE: 18 BRPM | BODY MASS INDEX: 26.13 KG/M2 | HEART RATE: 72 BPM | WEIGHT: 147.5 LBS | OXYGEN SATURATION: 98 % | TEMPERATURE: 98 F | HEIGHT: 63 IN | SYSTOLIC BLOOD PRESSURE: 119 MMHG

## 2023-04-07 DIAGNOSIS — E78.5 HYPERLIPIDEMIA, UNSPECIFIED HYPERLIPIDEMIA TYPE: ICD-10-CM

## 2023-04-07 DIAGNOSIS — R90.89 LEPTOMENINGEAL ENHANCEMENT ON MRI OF BRAIN: Primary | ICD-10-CM

## 2023-04-07 PROBLEM — Z78.9 STATIN INTOLERANCE: Chronic | Status: ACTIVE | Noted: 2017-09-15

## 2023-04-07 PROCEDURE — 99495 TRANSJ CARE MGMT MOD F2F 14D: CPT | Performed by: INTERNAL MEDICINE

## 2023-04-07 ASSESSMENT — PAIN SCALES - GENERAL: PAINLEVEL: NO PAIN (0)

## 2023-04-10 LAB
AMPAR2 IGG CSF QL CBA IFA: NEGATIVE
AMPHIPHYSIN IGG TITR CSF IF: NEGATIVE {TITER}
ANNOTATION COMMENT IMP: NORMAL
CASPR2 IGG CSF QL CBA IFA: NEGATIVE
CV2 IGG TITR CSF IF: NEGATIVE {TITER}
DPPX IGG CSF QL IF: NEGATIVE
GABABR IGG CSF QL CBA IFA: NEGATIVE
GAD65 IGG+IGM CSF IA-SCNC: 0 NMOL/L
GFAP ALPHA IGG CSF QL IF: NEGATIVE
GLIAL NUC TYPE 1 IGG TITR CSF IF: NEGATIVE {TITER}
HU1 IGG TITR CSF IF: NEGATIVE {TITER}
HU2 IGG TITR CSF IF: NEGATIVE {TITER}
HU3 IGG TITR CSF IF: NEGATIVE {TITER}
IGLON5 IGG CSF QL IF: NEGATIVE
IMMUNOLOGIST REVIEW: NORMAL
LGI1 IGG CSF QL CBA IFA: NEGATIVE
MGLUR1 IGG CSF QL IF: NEGATIVE
NEUROCHONDRIN IFA, CSF: NEGATIVE
NIF IGG CSF QL IF: NEGATIVE
NMDAR1 IGG CSF QL CBA IFA: NEGATIVE
PCA-1 IGG TITR CSF IF: NEGATIVE {TITER}
PCA-2 IGG TITR CSF IF: NEGATIVE {TITER}
PCA-TR IGG TITR CSF IF: NEGATIVE {TITER}
SEPTIN-7 IFA, CSF: NEGATIVE

## 2023-04-17 DIAGNOSIS — E78.5 HYPERLIPEMIA: ICD-10-CM

## 2023-04-17 DIAGNOSIS — R29.818 TRANSIENT NEUROLOGICAL SYMPTOMS: Primary | ICD-10-CM

## 2023-04-18 ENCOUNTER — MEDICAL CORRESPONDENCE (OUTPATIENT)
Dept: HEALTH INFORMATION MANAGEMENT | Facility: CLINIC | Age: 68
End: 2023-04-18
Payer: MEDICARE

## 2023-05-01 DIAGNOSIS — R29.818 TRANSIENT NEUROLOGICAL SYMPTOMS: Primary | ICD-10-CM

## 2023-05-01 DIAGNOSIS — E78.5 HYPERLIPEMIA: ICD-10-CM

## 2023-05-01 DIAGNOSIS — E78.5 HYPERLIPIDEMIA LDL GOAL <130: ICD-10-CM

## 2023-05-01 DIAGNOSIS — R94.31 ABNORMAL ELECTROCARDIOGRAM: ICD-10-CM

## 2023-05-01 DIAGNOSIS — Z82.49 FAMILY HISTORY OF PREMATURE CORONARY ARTERY DISEASE: ICD-10-CM

## 2023-05-01 DIAGNOSIS — E78.5 DYSLIPIDEMIA: ICD-10-CM

## 2023-05-01 DIAGNOSIS — R03.0 ELEVATED BLOOD PRESSURE READING WITHOUT DIAGNOSIS OF HYPERTENSION: ICD-10-CM

## 2023-05-01 DIAGNOSIS — Z82.49 FAMILY HISTORY OF ISCHEMIC HEART DISEASE: ICD-10-CM

## 2023-05-01 DIAGNOSIS — R93.1 ELEVATED CORONARY ARTERY CALCIUM SCORE: ICD-10-CM

## 2023-05-04 ENCOUNTER — PATIENT OUTREACH (OUTPATIENT)
Dept: NURSING | Facility: CLINIC | Age: 68
End: 2023-05-04
Payer: MEDICARE

## 2023-05-04 ASSESSMENT — ACTIVITIES OF DAILY LIVING (ADL): DEPENDENT_IADLS:: INDEPENDENT

## 2023-05-04 NOTE — PROGRESS NOTES
"Clinic Care Coordination Contact    Follow Up Progress Note      Assessment:   Patient reports having 3-6 bowel movements a day and has been researching IBS and is wondering if she has it.  Patient states the BM's range from \"formed, soft, hard\" and is wondering if this could be a side effect of Depakote.  Patient had a MRI of the Brain yesterday and is waiting to hear from Dr. Epstein at [facility] for the results.      Patient states she's working part time 2-4 hours a day and will go home and sleep the rest of the day.  Patient reports of right hand \"nerve tingling\" and \"now and then I feel like something's going on in my head\". Patient is wondering if she needs to continue the Depakote.  Patient states she hasn't had a headache \"like when I was in the hospital\" yet shares she has occasional HA's.  RNCC suggested patient to call Dr. Epstein and schedule a virtual visit to discuss the results and plan of care moving forward.  Patient verbalized her understanding and will call.  Patient has no other concerns or questions at this time.    Care Gaps:    Health Maintenance Due   Topic Date Due     ASTHMA ACTION PLAN  Never done     COVID-19 Vaccine (1) Never done     Pneumococcal Vaccine: 65+ Years (1 - PCV) Never done     HEPATITIS C SCREENING  Never done     ZOSTER IMMUNIZATION (1 of 2) Never done     LUNG CANCER SCREENING  Never done     INFLUENZA VACCINE (1) 09/01/2022     MAMMO SCREENING  03/23/2023     Care Gaps Last addressed on 5/4/2023    Care Plans  Care Plan: Prevent re-hospitalizations and Emergency Room visits     Problem: Lifestyle choices     Goal: Prevent re-hospitalizations and ER visits     Start Date: 1/16/2023 Expected End Date: 7/3/2023    This Visit's Progress: On track Recent Progress: No change    Note:     Barriers: Recent discharge from hospital  Strengths: Strong advocate for self  Patient expressed understanding of goal: Yes  Action steps to achieve this goal:  1. I will " utilize the Urgent Care at [place] or [facility]  2. I will contact my care team with questions, concerns, support needs   3. I will use the clinic as a resource, and I understand I can contact my clinic with 24/7 after hours services available                      Intervention/Education provided during outreach:   RNCC called and spoke with patient/caregiver; introduced self, discussed role of Care Coordination and explained reason for call    Plan:   -Patient will contact the care team with questions, concerns, support needs   -Patient will use the clinic as a resource and understands (s)he can contact the [facility] with 24/7 after hours services available  -Care Coordinator will remain available as needed  -RNCC will follow up in one month for follow up appointments/recommendations and goal progression     Rosi Lane RN, BSN, PHN  Primary Care / Care Coordinator   [facility]  E-[email or web address]   [phone]

## 2023-05-10 ENCOUNTER — PATIENT OUTREACH (OUTPATIENT)
Dept: NURSING | Facility: CLINIC | Age: 68
End: 2023-05-10
Payer: MEDICARE

## 2023-05-10 DIAGNOSIS — R90.89 LEPTOMENINGEAL ENHANCEMENT ON MRI OF BRAIN: Primary | Chronic | ICD-10-CM

## 2023-05-10 ASSESSMENT — ACTIVITIES OF DAILY LIVING (ADL): DEPENDENT_IADLS:: INDEPENDENT

## 2023-05-10 NOTE — PROGRESS NOTES
"Clinic Care Coordination Contact    Follow Up Progress Note      Assessment:   Patient is calling to apprise RNCC of \"not doing well\" the past few days.  Patient reports \"sleeping a lot\", lethargy and requesting meal delivery resources.  Patient is also requesting PCA services.  Patient doesn't feel like cooking \"let alone going to the grocery store\".  Patient is experiencing \"numbness and tingling to her right arm, teeth and to her right cheek\".  Patient states she lives alone and could \"need help around the house\".    RNCC suggested patient to call her insurance (Medicare [phone]) to see if they provide any of the above services.  RNCC gave her the phone numbers to [employer] ([phone]), [employer] Whe[employer] ([phone]) and [employer] ([phone]) meal delivery services.  RNCC gave patient the number to [place] [employer] [phone] to call for support.  RNCC also placed a referral to the Stroke RNCC for support and gave patient the number to the Senior Linkage Line ([phone]) for PCA resources.  RNCC reiterated  to ask her insurance if they provide these services.    Care Gaps:    Health Maintenance Due   Topic Date Due     ASTHMA ACTION PLAN  Never done     COVID-19 Vaccine (1) Never done     Pneumococcal Vaccine: 65+ Years (1 - PCV) Never done     HEPATITIS C SCREENING  Never done     ZOSTER IMMUNIZATION (1 of 2) Never done     LUNG CANCER SCREENING  11/13/2019     INFLUENZA VACCINE (1) 09/01/2022     MAMMO SCREENING  03/23/2023     Care Gaps Last addressed on 5/10/2023    Care Plans  Care Plan: Prevent re-hospitalizations and Emergency Room visits     Problem: Lifestyle choices     Goal: Prevent re-hospitalizations and ER visits     Start Date: 1/16/2023 Expected End Date: 7/3/2023    This Visit's Progress: On track Recent Progress: On track    Note:     Barriers: Recent discharge from hospital  Strengths: Strong advocate for self  Patient expressed understanding of goal: Yes  Action " steps to achieve this goal:  1. I will utilize the Urgent Care at [place] or [facility]  2. I will contact my care team with questions, concerns, support needs   3. I will use the clinic as a resource, and I understand I can contact my clinic with 24/7 after hours services available                      Intervention/Education provided during outreach:   RNCC called and spoke with patient/caregiver; introduced self, discussed role of Care Coordination and explained reason for call    Plan:   -Patient will contact the care team with questions, concerns, support needs   -Patient will use the clinic as a resource and understands (s)he can contact the [facility] with 24/7 after hours services available  -Care Coordinator will remain available as needed  -RNCC will follow up in one month for follow up appointments/recommendations and goal progression     Rosi Lane RN, BSN, PHN  Primary Care / Care Coordinator   [facility]  E-mail [email or web address]   [phone]

## 2023-05-12 ENCOUNTER — HOSPITAL ENCOUNTER (OUTPATIENT)
Dept: CARDIOLOGY | Facility: CLINIC | Age: 68
Discharge: HOME | End: 2023-05-12
Attending: PSYCHIATRY & NEUROLOGY | Admitting: PSYCHIATRY & NEUROLOGY
Payer: MEDICARE

## 2023-05-12 DIAGNOSIS — R03.0 ELEVATED BLOOD PRESSURE READING WITHOUT DIAGNOSIS OF HYPERTENSION: ICD-10-CM

## 2023-05-12 DIAGNOSIS — Z82.49 FAMILY HISTORY OF ISCHEMIC HEART DISEASE: ICD-10-CM

## 2023-05-12 DIAGNOSIS — E78.5 HYPERLIPEMIA: ICD-10-CM

## 2023-05-12 DIAGNOSIS — E78.5 DYSLIPIDEMIA: ICD-10-CM

## 2023-05-12 DIAGNOSIS — E78.5 HYPERLIPIDEMIA LDL GOAL <130: ICD-10-CM

## 2023-05-12 DIAGNOSIS — R94.31 ABNORMAL ELECTROCARDIOGRAM: ICD-10-CM

## 2023-05-12 DIAGNOSIS — R29.818 TRANSIENT NEUROLOGICAL SYMPTOMS: ICD-10-CM

## 2023-05-12 DIAGNOSIS — Z82.49 FAMILY HISTORY OF PREMATURE CORONARY ARTERY DISEASE: ICD-10-CM

## 2023-05-12 DIAGNOSIS — R93.1 ELEVATED CORONARY ARTERY CALCIUM SCORE: ICD-10-CM

## 2023-05-12 LAB — LVEF ECHO: NORMAL

## 2023-05-12 PROCEDURE — 999N000208 ECHOCARDIOGRAM COMPLETE

## 2023-05-12 PROCEDURE — 93306 TTE W/DOPPLER COMPLETE: CPT | Mod: 26 | Performed by: INTERNAL MEDICINE

## 2023-05-12 PROCEDURE — 93306 TTE W/DOPPLER COMPLETE: CPT

## 2023-05-16 ENCOUNTER — PATIENT OUTREACH (OUTPATIENT)
Dept: CARE COORDINATION | Facility: CLINIC | Age: 68
End: 2023-05-16
Payer: MEDICARE

## 2023-05-16 NOTE — PROGRESS NOTES
"    Stroke RN Care Coordination - Chart Review Note    SITUATION     Izabella Concepcion is a 67 year old female who is receiving support for:  Clinic Care Coordination - Initial (Stroke RNCC)      BACKGROUND     Stroke Care Coordination referral placed by lead RNCC on 05/10/2023 to assist patient with support of \"what to expect after having a stroke\"     ASSESSMENT     Per chart review, patient completed follow up with [facility] provider Karley Epstein on 04/17/2023.   Patient also follows with Dr. David Jauregui with [place] neurology.   RNCC attempted to reach out to patient with plan to provide \"Understanding Stroke patient handbook\" [email or web address]arNeurologyStrokeClinic/Shared%20Documents/Care%20Coordination/RN%20CC%20Coverage%9295-9061/Patient%20Education/Understanding%20Stroke%20Booklet%20-%20Final.pdf?[email or web address]=1&e=[email or web address]   And brain injury alliance resource: [email or web address]/RadLogicsthVascularNeurologyStrokeClinic/Shared%20Documents/Care%20Coordination/RN%20CC%20Coverage%0765-6671/Stroke%20Resources/MNBIA%20Free%20Support.pdf?csf=1&web=1&e=[email or web address]    Patient's voicemail box was full and unable to leave voicemail message.     PLAN     This writer will connect with primary care clinic care coordinator lead RNCC to provide additional resources/support as above if/as needed.   No further stroke outreaches will be completed at this time due to patient following with neurology team within another health care system.     Mildred Marks, RN, BSN   Covering RN Stroke Neurology Care Coordinator  [facility] Neuroscience Service Line     "

## 2023-06-01 ENCOUNTER — HEALTH MAINTENANCE LETTER (OUTPATIENT)
Age: 68
End: 2023-06-01

## 2023-06-19 ENCOUNTER — PATIENT OUTREACH (OUTPATIENT)
Dept: CARE COORDINATION | Facility: CLINIC | Age: 68
End: 2023-06-19
Payer: MEDICARE

## 2023-06-19 ASSESSMENT — ACTIVITIES OF DAILY LIVING (ADL): DEPENDENT_IADLS:: INDEPENDENT

## 2023-06-19 NOTE — PROGRESS NOTES
Clinic Care Coordination Contact  [facility]/Voicemail    Referral Source: Care Team  Clinical Data: Care Coordinator Outreach  Outreach attempted x 1.  Left message on patient's voicemail with call back information and requested return call.    Plan: Care Coordinator will try to reach patient again in 1 month.     Rosi Lane RN, BSN, PHN  Primary Care / Care Coordinator   [facility]  E-mail [email or web address]   [phone]

## 2023-06-21 ENCOUNTER — TRANSFERRED RECORDS (OUTPATIENT)
Dept: HEALTH INFORMATION MANAGEMENT | Facility: CLINIC | Age: 68
End: 2023-06-21
Payer: MEDICARE

## 2023-07-19 ENCOUNTER — PATIENT OUTREACH (OUTPATIENT)
Dept: NURSING | Facility: CLINIC | Age: 68
End: 2023-07-19
Payer: MEDICARE

## 2023-07-19 ASSESSMENT — ACTIVITIES OF DAILY LIVING (ADL): DEPENDENT_IADLS:: INDEPENDENT

## 2023-07-19 NOTE — PROGRESS NOTES
"Clinic Care Coordination Contact    Follow Up Progress Note      Assessment:   Patient states she's doing well and is on the \"mend\".  Patient is working 25 hours/week and is back at baseline.  Patient reports her friend suggested using \"Probiotics\" which patient has mimi using the last \"3-4 weeks\" and feels its working.  Patient states she is losing weight (somehting she would like to do) and is eating better-more vegetables and \"less heavy foods\".     Patient has follow up appointments at [place] next week and 2 weeks after that following up with [facility] Neuro and patient has no other concerns or questions at this time.    Care Gaps:    Health Maintenance Due   Topic Date Due     ASTHMA ACTION PLAN  Never done     COVID-19 Vaccine (1) Never done     Pneumococcal Vaccine: 65+ Years (1 - PCV) Never done     HEPATITIS C SCREENING  Never done     ZOSTER IMMUNIZATION (1 of 2) Never done     LUNG CANCER SCREENING  11/13/2019     MAMMO SCREENING  03/23/2023     ASTHMA CONTROL TEST  06/26/2023     MEDICARE ANNUAL WELLNESS VISIT  07/18/2023     Care Gaps Last addressed on 7/19/2023    Care Plans  Care Plan: Prevent re-hospitalizations and Emergency Room visits     Problem: Lifestyle choices     Goal: Prevent re-hospitalizations and ER visits     Start Date: 1/16/2023 Expected End Date: 10/19/2023    This Visit's Progress: On track Recent Progress: On track    Note:     Barriers: None  Strengths: Strong advocate for self  Patient expressed understanding of goal: Yes  Action steps to achieve this goal:  1. I will utilize the Urgent Care at [place] or [facility]  2. I will contact my care team with questions, concerns, support needs   3. I will use the clinic as a resource, and I understand I can contact my clinic with 24/7 after hours services available                      Intervention/Education provided during outreach:   RNCC called and spoke with patient/caregiver; introduced self, discussed role of Care " Coordination and explained reason for call    Plan:   -Patient will contact the care team with questions, concerns, support needs   -Patient will use the clinic as a resource and understands (s)he can contact the [facility] with 24/7 after hours services available  -Care Coordinator will remain available as needed  -RNCC will follow up in one month for follow up appointments/recommendations and goal progression     Rosi Lane RN, BSN, PHN  Primary Care / Care Coordinator   [facility]  [email or web address]   [phone]

## 2023-07-19 NOTE — LETTER
[facility]  Patient Centered Plan of Care  About Me:        Patient Name:  Izabella Concepcion    YOB: 1955  Age:         67 year old   Elizabeth MRN:    [id number] Telephone Information:  Home Phone [phone]   Mobile [phone]       Address:  [address] [place] [address]  [place] [address] Email address:  [email or web address]      Emergency Contact(s)    Name Relationship Lgl Grd Work Phone Home Phone Mobile Phone   1. JACKIE NAPOLES* Daughter No  [phone]    2. VALORIE CONCEPCION  Daughter No  [phone]    3. MIKAEL CONCEPCION Brother    [phone]           Primary language:  English     needed? No   [employer]:  [phone] op. 1  Other communication barriers:None    Preferred Method of Communication:  Judy  Current living arrangement: I live alone    Mobility Status/ Medical Equipment: Independent    Health Maintenance  Health Maintenance Reviewed: Due/Overdue   Health Maintenance Due   Topic Date Due     ASTHMA ACTION PLAN  Never done     COVID-19 Vaccine (1) Never done     Pneumococcal Vaccine: 65+ Years (1 - PCV) Never done     HEPATITIS C SCREENING  Never done     ZOSTER IMMUNIZATION (1 of 2) Never done     LUNG CANCER SCREENING  11/13/2019     MAMMO SCREENING  03/23/2023     ASTHMA CONTROL TEST  06/26/2023     MEDICARE ANNUAL WELLNESS VISIT  07/18/2023         My Access Plan  Medical Emergency 911   Primary Clinic Line [facility] [phone]   24 Hour Appointment Line [phone] or  [phone]) (toll-free)   24 Hour Nurse Line [phone] (toll-free)   Preferred Urgent Care [facility], [phone]     Preferred Hospital [facility]  [phone]     Preferred Pharmacy [facility] [place], [place] - [address]     Behavioral Health Crisis Line The National Suicide Prevention Lifeline at 1-723.219.8248 or Text/Call 738     My Care Team  Members  Patient Care Team         Relationship Specialty Notifications Start End    Shawanda Doran DO PCP - General Internal Medicine  7/12/21     Phone: [phone] Fax: [phone] [address] [place] [address]    Pam Lester, PsyD  Psychology  12/7/17     Shawanda Doran DO Assigned PCP   8/1/21     Phone: [phone] Fax: [phone] [address] [place] [address]    Rosi Lane, RN Lead Care Coordinator  Admissions 1/11/23     Nallely Kearney DO Assigned Heart and Vascular Provider   5/6/23     Phone: [phone] Fax: [phone] [address] [place] [address]              My Care Plans  Self Management and Treatment Plan  Care Plan  Care Plan: Prevent re-hospitalizations and Emergency Room visits       Problem: Lifestyle choices       Goal: Prevent re-hospitalizations and ER visits       Start Date: 1/16/2023 Expected End Date: 10/19/2023    This Visit's Progress: On track Recent Progress: On track    Note:     Barriers: None  Strengths: Strong advocate for self  Patient expressed understanding of goal: Yes  Action steps to achieve this goal:  1. I will utilize the Urgent Care at [place] or [facility]  2. I will contact my care team with questions, concerns, support needs   3. I will use the clinic as a resource, and I understand I can contact my clinic with 24/7 after hours services available                               Action Plans on File:     Advance Care Plans/Directives Type:   -- (Full Code)      My Medical and Care Information  Problem List   Patient Active Problem List   Diagnosis     Moderate persistent asthma without complication     Statin intolerance     Femur fracture, right (H)     Osteopenia, unspecified location     Generalized anxiety disorder     Elevated BP without diagnosis of hypertension     Hyperlipidemia     Family history of early CAD     Compression fracture     Allergic rhinitis     Hematochezia     Syncope      Anemia due to blood loss, acute     Colonoscopy causing post-procedural bleeding     Weakness of right upper extremity     Right upper extremity numbness     Word finding difficulty     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  A delay in compatible RBCs may occur.     Dogs Itching     Nickel      Other reaction(s): Other (see comments)  Unknown.      Nickel (non food)     Pollen Extract      Other reaction(s): Other (see comments)  unknown     Vigamox [Moxifloxacin] Swelling     Current Outpatient Medications   Medication     divalproex sodium delayed-release (DEPAKOTE) 500 MG DR tablet     omeprazole (PRILOSEC) 20 MG DR capsule     No current facility-administered medications for this visit.     Care Coordination Start Date: 1/10/2023   Frequency of Care Coordination: monthly     Form Last Updated: 07/19/2023

## 2023-08-16 ENCOUNTER — PATIENT OUTREACH (OUTPATIENT)
Dept: CARE COORDINATION | Facility: CLINIC | Age: 68
End: 2023-08-16
Payer: MEDICARE

## 2023-08-16 ASSESSMENT — ACTIVITIES OF DAILY LIVING (ADL): DEPENDENT_IADLS:: INDEPENDENT

## 2023-08-16 NOTE — PROGRESS NOTES
Clinic Care Coordination Contact  [facility]/Voicemail    Referral Source: Care Team  Clinical Data: Care Coordinator Outreach  Outreach attempted x 1.  Left message on patient's voicemail with call back information and requested return call.    Plan: Care Coordinator will try to reach patient again in 1 month.     Rosi Lane RN, BSN, PHN  Primary Care / Care Coordinator   [facility]  E-[email or web address]   [phone]

## 2023-08-27 ENCOUNTER — HEALTH MAINTENANCE LETTER (OUTPATIENT)
Age: 68
End: 2023-08-27

## 2023-09-13 ENCOUNTER — PATIENT OUTREACH (OUTPATIENT)
Dept: CARE COORDINATION | Facility: CLINIC | Age: 68
End: 2023-09-13
Payer: MEDICARE

## 2023-09-13 ASSESSMENT — ACTIVITIES OF DAILY LIVING (ADL): DEPENDENT_IADLS:: INDEPENDENT

## 2023-09-13 NOTE — PROGRESS NOTES
Clinic Care Coordination Contact  [facility]/Voicemail    Referral Source: Care Team  Clinical Data: Care Coordinator Outreach  Outreach attempted x 2.  Left message on patient's voicemail with call back information and requested return call.    Plan: Care Coordinator will try to reach patient again in 1 month.     Rosi Lane RN, BSN, PHN  Primary Care / Care Coordinator   [facility]  [email or web address] [email or web address]   [phone]

## 2023-09-27 ENCOUNTER — LAB REQUISITION (OUTPATIENT)
Dept: LAB | Facility: CLINIC | Age: 68
End: 2023-09-27
Payer: MEDICARE

## 2023-09-27 ENCOUNTER — LAB REQUISITION (OUTPATIENT)
Dept: LAB | Facility: CLINIC | Age: 68
End: 2023-09-27

## 2023-09-27 DIAGNOSIS — N95.0 POSTMENOPAUSAL BLEEDING: ICD-10-CM

## 2023-09-27 DIAGNOSIS — Z87.898 PERSONAL HISTORY OF OTHER SPECIFIED CONDITIONS: ICD-10-CM

## 2023-09-27 PROCEDURE — G0145 SCR C/V CYTO,THINLAYER,RESCR: HCPCS | Performed by: OBSTETRICS & GYNECOLOGY

## 2023-09-27 PROCEDURE — 87624 HPV HI-RISK TYP POOLED RSLT: CPT | Performed by: OBSTETRICS & GYNECOLOGY

## 2023-09-27 PROCEDURE — 88305 TISSUE EXAM BY PATHOLOGIST: CPT | Mod: TC,ORL | Performed by: OBSTETRICS & GYNECOLOGY

## 2023-09-27 PROCEDURE — 87624 HPV HI-RISK TYP POOLED RSLT: CPT | Mod: ORL | Performed by: OBSTETRICS & GYNECOLOGY

## 2023-09-27 PROCEDURE — G0145 SCR C/V CYTO,THINLAYER,RESCR: HCPCS | Mod: ORL | Performed by: OBSTETRICS & GYNECOLOGY

## 2023-10-02 LAB
BKR LAB AP GYN ADEQUACY: NORMAL
BKR LAB AP GYN ADEQUACY: NORMAL
BKR LAB AP GYN INTERPRETATION: NORMAL
BKR LAB AP GYN INTERPRETATION: NORMAL
BKR LAB AP HPV REFLEX: NORMAL
BKR LAB AP HPV REFLEX: NORMAL
BKR LAB AP LMP: NORMAL
BKR LAB AP LMP: NORMAL
BKR LAB AP PREVIOUS ABNL DX: NORMAL
BKR LAB AP PREVIOUS ABNL DX: NORMAL
BKR LAB AP PREVIOUS ABNORMAL: NORMAL
BKR LAB AP PREVIOUS ABNORMAL: NORMAL
PATH REPORT.COMMENTS IMP SPEC: NORMAL
PATH REPORT.FINAL DX SPEC: NORMAL
PATH REPORT.GROSS SPEC: NORMAL
PATH REPORT.MICROSCOPIC SPEC OTHER STN: NORMAL
PATH REPORT.RELEVANT HX SPEC: NORMAL
PHOTO IMAGE: NORMAL

## 2023-10-02 PROCEDURE — 88305 TISSUE EXAM BY PATHOLOGIST: CPT | Mod: 26 | Performed by: PATHOLOGY

## 2023-10-04 ENCOUNTER — TRANSFERRED RECORDS (OUTPATIENT)
Dept: HEALTH INFORMATION MANAGEMENT | Facility: CLINIC | Age: 68
End: 2023-10-04
Payer: MEDICARE

## 2023-10-04 LAB
HUMAN PAPILLOMA VIRUS 16 DNA: NEGATIVE
HUMAN PAPILLOMA VIRUS 16 DNA: NEGATIVE
HUMAN PAPILLOMA VIRUS 18 DNA: NEGATIVE
HUMAN PAPILLOMA VIRUS 18 DNA: NEGATIVE
HUMAN PAPILLOMA VIRUS FINAL DIAGNOSIS: NORMAL
HUMAN PAPILLOMA VIRUS FINAL DIAGNOSIS: NORMAL
HUMAN PAPILLOMA VIRUS OTHER HR: NEGATIVE
HUMAN PAPILLOMA VIRUS OTHER HR: NEGATIVE

## 2023-10-09 ENCOUNTER — TRANSFERRED RECORDS (OUTPATIENT)
Dept: HEALTH INFORMATION MANAGEMENT | Facility: CLINIC | Age: 68
End: 2023-10-09
Payer: MEDICARE

## 2023-10-18 ENCOUNTER — APPOINTMENT (OUTPATIENT)
Dept: URBAN - METROPOLITAN AREA CLINIC 256 | Age: 68
Setting detail: DERMATOLOGY
End: 2023-10-18

## 2023-10-18 VITALS — WEIGHT: 148 LBS | HEIGHT: 63 IN

## 2023-10-18 DIAGNOSIS — Z71.89 OTHER SPECIFIED COUNSELING: ICD-10-CM

## 2023-10-18 DIAGNOSIS — L57.8 OTHER SKIN CHANGES DUE TO CHRONIC EXPOSURE TO NONIONIZING RADIATION: ICD-10-CM

## 2023-10-18 DIAGNOSIS — D18.0 HEMANGIOMA: ICD-10-CM

## 2023-10-18 DIAGNOSIS — L82.1 OTHER SEBORRHEIC KERATOSIS: ICD-10-CM

## 2023-10-18 DIAGNOSIS — D22 MELANOCYTIC NEVI: ICD-10-CM

## 2023-10-18 PROBLEM — D22.61 MELANOCYTIC NEVI OF RIGHT UPPER LIMB, INCLUDING SHOULDER: Status: ACTIVE | Noted: 2023-10-18

## 2023-10-18 PROBLEM — D22.71 MELANOCYTIC NEVI OF RIGHT LOWER LIMB, INCLUDING HIP: Status: ACTIVE | Noted: 2023-10-18

## 2023-10-18 PROBLEM — D18.01 HEMANGIOMA OF SKIN AND SUBCUTANEOUS TISSUE: Status: ACTIVE | Noted: 2023-10-18

## 2023-10-18 PROBLEM — D22.62 MELANOCYTIC NEVI OF LEFT UPPER LIMB, INCLUDING SHOULDER: Status: ACTIVE | Noted: 2023-10-18

## 2023-10-18 PROBLEM — D22.72 MELANOCYTIC NEVI OF LEFT LOWER LIMB, INCLUDING HIP: Status: ACTIVE | Noted: 2023-10-18

## 2023-10-18 PROBLEM — D22.5 MELANOCYTIC NEVI OF TRUNK: Status: ACTIVE | Noted: 2023-10-18

## 2023-10-18 PROCEDURE — OTHER COUNSELING: OTHER

## 2023-10-18 PROCEDURE — 99203 OFFICE O/P NEW LOW 30 MIN: CPT

## 2023-10-18 PROCEDURE — OTHER MIPS QUALITY: OTHER

## 2023-10-18 PROCEDURE — OTHER SUNSCREEN RECOMMENDATIONS: OTHER

## 2023-10-18 ASSESSMENT — LOCATION DETAILED DESCRIPTION DERM
LOCATION DETAILED: LEFT ANTERIOR DISTAL THIGH
LOCATION DETAILED: RIGHT PROXIMAL DORSAL FOREARM
LOCATION DETAILED: RIGHT VENTRAL PROXIMAL FOREARM
LOCATION DETAILED: EPIGASTRIC SKIN
LOCATION DETAILED: LEFT ANTECUBITAL SKIN
LOCATION DETAILED: MIDDLE STERNUM
LOCATION DETAILED: LEFT INFERIOR CENTRAL MALAR CHEEK
LOCATION DETAILED: INFERIOR THORACIC SPINE
LOCATION DETAILED: LEFT MEDIAL UPPER BACK
LOCATION DETAILED: RIGHT ANTERIOR PROXIMAL THIGH
LOCATION DETAILED: LEFT VENTRAL DISTAL FOREARM
LOCATION DETAILED: LEFT DISTAL DORSAL FOREARM
LOCATION DETAILED: RIGHT ANTERIOR DISTAL THIGH
LOCATION DETAILED: LEFT ANTERIOR PROXIMAL THIGH
LOCATION DETAILED: RIGHT VENTRAL DISTAL FOREARM
LOCATION DETAILED: LEFT VENTRAL PROXIMAL FOREARM
LOCATION DETAILED: LEFT INFERIOR LATERAL MIDBACK

## 2023-10-18 ASSESSMENT — LOCATION ZONE DERM
LOCATION ZONE: ARM
LOCATION ZONE: TRUNK
LOCATION ZONE: LEG
LOCATION ZONE: FACE

## 2023-10-18 ASSESSMENT — LOCATION SIMPLE DESCRIPTION DERM
LOCATION SIMPLE: RIGHT THIGH
LOCATION SIMPLE: UPPER BACK
LOCATION SIMPLE: LEFT LOWER BACK
LOCATION SIMPLE: CHEST
LOCATION SIMPLE: LEFT UPPER BACK
LOCATION SIMPLE: LEFT CHEEK
LOCATION SIMPLE: LEFT UPPER ARM
LOCATION SIMPLE: LEFT THIGH
LOCATION SIMPLE: RIGHT FOREARM
LOCATION SIMPLE: LEFT FOREARM
LOCATION SIMPLE: ABDOMEN

## 2023-10-19 ENCOUNTER — PATIENT OUTREACH (OUTPATIENT)
Dept: NURSING | Facility: CLINIC | Age: 68
End: 2023-10-19
Payer: MEDICARE

## 2023-10-19 ASSESSMENT — ACTIVITIES OF DAILY LIVING (ADL): DEPENDENT_IADLS:: INDEPENDENT

## 2023-10-19 NOTE — PROGRESS NOTES
"Clinic Care Coordination Contact  Follow Up Progress Note      Assessment:   Patient states she's doing \"well\" and her stomach issues are still prevalent yet getting \"better\".  Patient states patient is weight down to 148 lbs from 163 lbs and is hoping to lose more weight to get down to 130 lbs.    Patient states she started taking Relaxium Sleep to help with sleeping through the night yet realized her BP was elevating so she stopped taking.  Per patient, she's going to reah out to [place] Neurologist to inquire more about the medication and \"what substance\" is increasing her BP.  Patient reports she knows when her BP is up due to getting a headache.  Patient also reports she has stopped taking her Depakote since she was told she did not have a stroke and has not had any \"real issues\" with headaches.    Patient states she's still working part-time and that her partner had passed away from cancer this past August 2023 which has increased her work load.  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  Patient has no other questions or concerns at this time.    Care Gaps:    Health Maintenance Due   Topic Date Due    ASTHMA ACTION PLAN  Never done    COVID-19 Vaccine (1) Never done    Pneumococcal Vaccine: 65+ Years (1 - PCV) Never done    HEPATITIS C SCREENING  Never done    ZOSTER IMMUNIZATION (1 of 2) Never done    RSV VACCINE 60+ (1 - 1-dose 60+ series) Never done    LUNG CANCER SCREENING  11/13/2019    MAMMO SCREENING  03/23/2023    ASTHMA CONTROL TEST  06/26/2023    MEDICARE ANNUAL WELLNESS VISIT  07/18/2023    INFLUENZA VACCINE (1) 09/01/2023     Care Gaps Last addressed on 10/19/2023    Care Plans  Care Plan: Prevent re-hospitalizations and Emergency Room visits       Problem: Lifestyle choices       Goal: Prevent re-hospitalizations and ER visits       Start Date: 1/16/2023 Expected End Date: 1/16/2024    This Visit's Progress: On track Recent Progress: On track    " Note:     Barriers: None  Strengths: Strong advocate for self  Patient expressed understanding of goal: Yes  Action steps to achieve this goal:  1. I will utilize the Urgent Care at [place] or [facility]  2. I will contact my care team with questions, concerns, support needs   3. I will use the clinic as a resource, and I understand I can contact my clinic with 24/7 after hours services available                            Intervention/Education provided during outreach:  RNCC called and spoke with patient/caregiver; introduced self, discussed role of Care Coordination and explained reason for call    Plan:   -Patient will contact the care team with questions, concerns, support needs   -Patient will use the clinic as a resource and understands (s)he can contact the [facility] with 24/7 after hours services available  -Care Coordinator will remain available as needed  -RNCC will follow up in one month for follow up appointments/recommendations and goal progression     Rosi Lane RN, BSN, PHN  Primary Care / Care Coordinator   [facility]  E-mail [email or web address]   [phone]

## 2023-10-19 NOTE — LETTER
[facility]  Patient Centered Plan of Care  About Me:        Patient Name:  Izabella Concepcion    YOB: 1955  Age:         67 year old   Elizabeth MRN:    [id number] Telephone Information:  Home Phone [phone]   Mobile [phone]       Address:  [address] [place] [address]  [place] [address] Email address:  [email or web address]      Emergency Contact(s)    Name Relationship Lgl Grd Work Phone Home Phone Mobile Phone   1. JACKIE NAPOLES* Daughter No  [phone]    2. VALORIE CONCEPCION  Daughter No  [phone]    3. MIKAEL CONCEPCION Brother    [phone]           Primary language:  English     needed? No   [employer]:  [phone] op. 1  Other communication barriers:None    Preferred Method of Communication:  Judy  Current living arrangement: I live alone    Mobility Status/ Medical Equipment: Independent    Health Maintenance  Health Maintenance Reviewed: Due/Overdue   Health Maintenance Due   Topic Date Due    ASTHMA ACTION PLAN  Never done    COVID-19 Vaccine (1) Never done    Pneumococcal Vaccine: 65+ Years (1 - PCV) Never done    HEPATITIS C SCREENING  Never done    ZOSTER IMMUNIZATION (1 of 2) Never done    RSV VACCINE 60+ (1 - 1-dose 60+ series) Never done    LUNG CANCER SCREENING  11/13/2019    MAMMO SCREENING  03/23/2023    ASTHMA CONTROL TEST  06/26/2023    MEDICARE ANNUAL WELLNESS VISIT  07/18/2023    INFLUENZA VACCINE (1) 09/01/2023       My Access Plan  Medical Emergency 911   Primary Clinic Line [facility]    24 Hour Appointment Line [phone] or  [phone]) (toll-free)   24 Hour Nurse Line [phone] (toll-free)   Preferred Urgent Care [facility], [phone]     Preferred Hospital [facility]  [phone]     Preferred Pharmacy [facility] [place] - [address]     Behavioral Health Crisis Line The National Suicide Prevention Lifeline at  2-261-063-6286 or Text/Call 988     My Care Team Members  Patient Care Team         Relationship Specialty Notifications Start End    Shawanda Doran MD PCP - General Internal Medicine  7/12/21 12/27/23    Reason for change:  Provider Leaving Clinic    Change requested by:  Member    [id number]    Phone: [phone] Fax: [phone] [address] [place] [address]    Pam Lester, PsyD  Psychology  12/7/17     Shawanda Doran MD Assigned PCP   8/1/21     Phone: [phone] Fax: [phone] [address] [place] [address]    Rosi Lane, RN Lead Care Coordinator  Admissions 1/11/23     Nallely Kearney DO Assigned Heart and Vascular Provider   5/6/23     Phone: [phone] Fax: [phone] [address] [place] [address]              My Care Plans  Self Management and Treatment Plan  Care Plan  Care Plan: Prevent re-hospitalizations and Emergency Room visits       Problem: Lifestyle choices       Goal: Prevent re-hospitalizations and ER visits       Start Date: 1/16/2023 Expected End Date: 1/16/2024    This Visit's Progress: On track Recent Progress: On track    Note:     Barriers: None  Strengths: Strong advocate for self  Patient expressed understanding of goal: Yes  Action steps to achieve this goal:  1. I will utilize the Urgent Care at [place] or [facility]  2. I will contact my care team with questions, concerns, support needs   3. I will use the clinic as a resource, and I understand I can contact my clinic with 24/7 after hours services available                               Action Plans on File:     Advance Care Plans/Directives Type:   -- (Full Code)      My Medical and Care Information  Problem List   Patient Active Problem List   Diagnosis    Moderate persistent asthma without complication    Statin intolerance    Femur fracture, right (H)    Osteopenia, unspecified location    Generalized anxiety disorder    Elevated BP without diagnosis  of hypertension    Hyperlipidemia    Family history of early CAD    Compression fracture    Allergic rhinitis    Hematochezia    Syncope    Anemia due to blood loss, acute    Colonoscopy causing post-procedural bleeding    Weakness of right upper extremity    Right upper extremity numbness    Word finding difficulty    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  A delay in compatible RBCs may occur.    Dogs Itching    Nickel      Other reaction(s): Other (see comments)  Unknown.      Nickel (non food)    Pollen Extract      Other reaction(s): Other (see comments)  unknown    Vigamox [Moxifloxacin] Swelling     Current Outpatient Medications   Medication    divalproex sodium delayed-release (DEPAKOTE) 500 MG DR tablet    omeprazole (PRILOSEC) 20 MG DR capsule     No current facility-administered medications for this visit.     Care Coordination Start Date: 1/10/2023   Frequency of Care Coordination: monthly     Form Last Updated: 10/19/2023

## 2023-10-27 ENCOUNTER — PATIENT OUTREACH (OUTPATIENT)
Dept: GASTROENTEROLOGY | Facility: CLINIC | Age: 68
End: 2023-10-27
Payer: MEDICARE

## 2023-10-31 ENCOUNTER — LAB REQUISITION (OUTPATIENT)
Dept: LAB | Facility: CLINIC | Age: 68
End: 2023-10-31
Payer: MEDICARE

## 2023-10-31 DIAGNOSIS — N95.0 POSTMENOPAUSAL BLEEDING: ICD-10-CM

## 2023-10-31 PROCEDURE — 88305 TISSUE EXAM BY PATHOLOGIST: CPT | Mod: TC,ORL | Performed by: OBSTETRICS & GYNECOLOGY

## 2023-11-07 LAB
PATH REPORT.COMMENTS IMP SPEC: NORMAL
PATH REPORT.FINAL DX SPEC: NORMAL
PATH REPORT.GROSS SPEC: NORMAL
PATH REPORT.MICROSCOPIC SPEC OTHER STN: NORMAL
PATH REPORT.RELEVANT HX SPEC: NORMAL
PHOTO IMAGE: NORMAL

## 2023-11-07 PROCEDURE — 88305 TISSUE EXAM BY PATHOLOGIST: CPT | Mod: 26 | Performed by: PATHOLOGY

## 2023-11-20 ENCOUNTER — PATIENT OUTREACH (OUTPATIENT)
Dept: CARE COORDINATION | Facility: CLINIC | Age: 68
End: 2023-11-20
Payer: MEDICARE

## 2023-11-20 ASSESSMENT — ACTIVITIES OF DAILY LIVING (ADL): DEPENDENT_IADLS:: INDEPENDENT

## 2023-11-20 NOTE — PROGRESS NOTES
Clinic Care Coordination Contact  [facility]/Voicemail    Clinical Data: Care Coordinator Outreach    Outreach Documentation Number of Outreach Attempt   11/20/2023   8:58 AM 1       Left message on patient's voicemail with call back information and requested return call.    Plan: Care Coordinator will try to reach patient again in 1 month.     Rosi Lane RN, BSN, PHN  Primary Care / Care Coordinator   [facility]  E-mail [email or web address]   [phone]

## 2023-12-20 ENCOUNTER — PATIENT OUTREACH (OUTPATIENT)
Dept: CARE COORDINATION | Facility: CLINIC | Age: 68
End: 2023-12-20
Payer: MEDICARE

## 2023-12-20 ASSESSMENT — ACTIVITIES OF DAILY LIVING (ADL): DEPENDENT_IADLS:: INDEPENDENT

## 2024-01-16 ENCOUNTER — PATIENT OUTREACH (OUTPATIENT)
Dept: CARE COORDINATION | Facility: CLINIC | Age: 69
End: 2024-01-16
Payer: MEDICARE

## 2024-01-16 ASSESSMENT — ACTIVITIES OF DAILY LIVING (ADL): DEPENDENT_IADLS:: INDEPENDENT

## 2024-01-16 NOTE — LETTER
[facility]  Patient Centered Plan of Care  About Me:        Patient Name:  Izabella Concepcion    YOB: 1955  Age:         68 year old   Elizabeth MRN:    [id number] Telephone Information:  Home Phone [phone]   Mobile [phone]       Address:  [address] [place] [address]  [place] [address] Email address:  [email or web address]      Emergency Contact(s)    Name Relationship Lgl Grd Work Phone Home Phone Mobile Phone   1. JACKIE NAPOLES* Daughter No  [phone]    2. VALORIE CONCEPCION  Daughter No  [phone]    3. MIKAEL CONCEPCION Brother    [phone]           Primary language:  English     needed? No   [facility] [employer]:  [phone] op. 1  Other communication barriers:None    Preferred Method of Communication:  Judy  Current living arrangement: I live alone    Mobility Status/ Medical Equipment: Independent    Health Maintenance  Health Maintenance Reviewed: Due/Overdue   Health Maintenance Due   Topic Date Due    ASTHMA ACTION PLAN  Never done    COVID-19 Vaccine (1) Never done    Pneumococcal Vaccine: 65+ Years (1 of 2 - PCV) Never done    HEPATITIS C SCREENING  Never done    ZOSTER IMMUNIZATION (1 of 2) Never done    RSV VACCINE (Pregnancy & 60+) (1 - 1-dose 60+ series) Never done    LUNG CANCER SCREENING  11/13/2019    MAMMO SCREENING  03/23/2023    ASTHMA CONTROL TEST  06/26/2023    MEDICARE ANNUAL WELLNESS VISIT  07/18/2023    INFLUENZA VACCINE (1) 09/01/2023    PHQ-2 (once per calendar year)  01/01/2024    COLORECTAL CANCER SCREENING  01/07/2024     My Access Plan  Medical Emergency 911   Primary Clinic Line     24 Hour Appointment Line [phone] or  [phone]) (toll-free)   24 Hour Nurse Line [phone] (toll-free)   Preferred Urgent Care [facility], [phone]     Preferred Hospital [facility]  [phone]     Preferred Pharmacy [facility] [address] [place] [address]      Behavioral Health Crisis Line The National Suicide Prevention Lifeline at 1-305.865.2967 or Text/Call 988     My Care Team Members  Patient Care Team         Relationship Specialty Notifications Start End    Pam Lester, PsyD  Psychology  12/7/17     Rosi Lane, RN Lead Care Coordinator  Admissions 1/11/23     Nallely Kearney DO Assigned Heart and Vascular Provider   5/6/23     Phone: [phone] Fax: [phone] [address] [place] [address]    Blanco Wang MD Assigned PCP   10/7/23     Phone: [phone] Pager: [phone] Fax: [phone] [address] [place] [address]                My Care Plans  Self Management and Treatment Plan    Care Plan  Care Plan: Prevent re-hospitalizations and Emergency Room visits       Problem: Lifestyle choices       Goal: Prevent re-hospitalizations and ER visits       Start Date: 1/16/2023 Expected End Date: 4/16/2024    This Visit's Progress: On track Recent Progress: On track    Note:     Barriers: None  Strengths: Strong advocate for self  Patient expressed understanding of goal: Yes  Action steps to achieve this goal:  1. I will utilize the Urgent Care at [place] or [place] clinics  2. I will contact my care team with questions, concerns, support needs   3. I will use the clinic as a resource, and I understand I can contact my clinic with 24/7 after hours services available                            Action Plans on File:     Advance Care Plans/Directives:   Advanced Care Plan/Directives on file:   No    Discussed with patient/caregiver(s): No data recorded           My Medical and Care Information  Problem List   Patient Active Problem List   Diagnosis    Moderate persistent asthma without complication    Statin intolerance    Femur fracture, right (H)    Osteopenia, unspecified location    Generalized anxiety disorder    Elevated BP without diagnosis of hypertension    Hyperlipidemia    Family history of  early CAD    Compression fracture    Allergic rhinitis    Hematochezia    Syncope    Anemia due to blood loss, acute    Colonoscopy causing post-procedural bleeding    Weakness of right upper extremity    Right upper extremity numbness    Word finding difficulty    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  A delay in compatible RBCs may occur.    Dogs Itching    Nickel      Other reaction(s): Other (see comments)  Unknown.      Nickel (non food)    Pollen Extract      Other reaction(s): Other (see comments)  unknown    Vigamox [Moxifloxacin] Swelling     Current Outpatient Medications   Medication    divalproex sodium delayed-release (DEPAKOTE) 500 MG DR tablet    omeprazole (PRILOSEC) 20 MG DR capsule     No current facility-administered medications for this visit.         Care Coordination Start Date: 1/10/2023   Frequency of Care Coordination: monthly, more frequently as needed     Form Last Updated: 01/16/2024

## 2024-01-16 NOTE — PROGRESS NOTES
Clinic Care Coordination Contact  [facility]/Voicemail    Clinical Data: Care Coordinator Outreach    Outreach Documentation Number of Outreach Attempt   11/20/2023   8:58 AM 1   12/20/2023   9:38 AM 2   1/16/2024   9:26 AM 3       Left message on patient's voicemail with call back information and requested return call.    Plan: Care Coordinator will try to reach patient again in 1 month and if unable to contact patient, may disenroll patient from Care Coordination.     Rosi Lane RN, BSN, PHN  Primary Care / Care Coordinator   [facility]  E-mail [email or web address]   [phone]

## 2024-01-18 ENCOUNTER — PATIENT OUTREACH (OUTPATIENT)
Dept: GASTROENTEROLOGY | Facility: CLINIC | Age: 69
End: 2024-01-18
Payer: MEDICARE

## 2024-02-16 ENCOUNTER — PATIENT OUTREACH (OUTPATIENT)
Dept: CARE COORDINATION | Facility: CLINIC | Age: 69
End: 2024-02-16
Payer: MEDICARE

## 2024-02-16 NOTE — PROGRESS NOTES
Clinic Care Coordination Contact  [facility]/Voicemail    Clinical Data: Care Coordinator Outreach    Outreach Documentation Number of Outreach Attempt   1/16/2024   9:26 AM 3   2/16/2024   1:41 PM 4     Left message on patient's voicemail with call back information and requested return call.    Plan: Care Coordinator will send unable to contact letter with care coordinator contact information via [employer]. Care Coordinator will do no further outreaches at this time.     Rosi Lane RN, BSN, PHN  Primary Care / Care Coordinator   [facility]  E-mail [email or web address]   [phone]

## 2024-02-16 NOTE — LETTER
[facility] CARE COORDINATION  No primary provider on file.    February 16, 2024    Izabella Concepcion  [address]  [place] [address]      Dear Izabella,    I have been unsuccessful in reaching you since our last contact. At this time the Care Coordination team will make no further attempts to reach you, however this does not change your ability to continue receiving care from your providers at your primary care clinic. If you need additional support from a care coordinator in the future please contact [address] [address] at [phone].    All of us at [facility] are invested in your health and are here to assist you in meeting your goals.     Sincerely,     Rosi Lane RN, BSN, PHN  Primary Care / Care Coordinator   [facility]  E-mail [email or web address]   [phone]

## 2024-06-28 ENCOUNTER — TRANSFERRED RECORDS (OUTPATIENT)
Dept: HEALTH INFORMATION MANAGEMENT | Facility: CLINIC | Age: 69
End: 2024-06-28
Payer: MEDICARE

## 2024-07-01 ENCOUNTER — TRANSFERRED RECORDS (OUTPATIENT)
Dept: HEALTH INFORMATION MANAGEMENT | Facility: CLINIC | Age: 69
End: 2024-07-01
Payer: MEDICARE

## 2024-10-03 ENCOUNTER — TELEPHONE (OUTPATIENT)
Dept: CARDIOLOGY | Facility: CLINIC | Age: 69
End: 2024-10-03
Payer: MEDICARE

## 2024-10-03 NOTE — TELEPHONE ENCOUNTER
Left message for patient to return my call at their earliest convenience regarding PCP's records.  Will attempt return call at a later time.      Lyric GUERRA 10/03/24  2:54 PM

## 2024-10-20 ENCOUNTER — HEALTH MAINTENANCE LETTER (OUTPATIENT)
Age: 69
End: 2024-10-20

## 2024-12-02 ENCOUNTER — TRANSFERRED RECORDS (OUTPATIENT)
Dept: HEALTH INFORMATION MANAGEMENT | Facility: CLINIC | Age: 69
End: 2024-12-02
Payer: MEDICARE

## 2025-01-01 ENCOUNTER — HOSPITAL ENCOUNTER (EMERGENCY)
Facility: CLINIC | Age: 70
Discharge: HOME | End: 2025-01-01
Attending: EMERGENCY MEDICINE | Admitting: EMERGENCY MEDICINE
Payer: MEDICARE

## 2025-01-01 VITALS
HEART RATE: 95 BPM | OXYGEN SATURATION: 98 % | RESPIRATION RATE: 16 BRPM | DIASTOLIC BLOOD PRESSURE: 85 MMHG | SYSTOLIC BLOOD PRESSURE: 189 MMHG | TEMPERATURE: 97.2 F

## 2025-01-01 DIAGNOSIS — I10 HYPERTENSION, UNSPECIFIED TYPE: ICD-10-CM

## 2025-01-01 DIAGNOSIS — R51.9 NONINTRACTABLE HEADACHE, UNSPECIFIED CHRONICITY PATTERN, UNSPECIFIED HEADACHE TYPE: ICD-10-CM

## 2025-01-01 LAB
ANION GAP SERPL CALCULATED.3IONS-SCNC: 11 MMOL/L (ref 7–15)
BASOPHILS # BLD AUTO: 0.1 10E3/UL (ref 0–0.2)
BASOPHILS NFR BLD AUTO: 1 %
BUN SERPL-MCNC: 9 MG/DL (ref 8–23)
CALCIUM SERPL-MCNC: 10 MG/DL (ref 8.8–10.4)
CHLORIDE SERPL-SCNC: 105 MMOL/L (ref 98–107)
CREAT SERPL-MCNC: 0.67 MG/DL (ref 0.51–0.95)
EGFRCR SERPLBLD CKD-EPI 2021: >90 ML/MIN/1.73M2
EOSINOPHIL # BLD AUTO: 0.2 10E3/UL (ref 0–0.7)
EOSINOPHIL NFR BLD AUTO: 2 %
ERYTHROCYTE [DISTWIDTH] IN BLOOD BY AUTOMATED COUNT: 12.3 % (ref 10–15)
GLUCOSE SERPL-MCNC: 95 MG/DL (ref 70–99)
HCO3 SERPL-SCNC: 27 MMOL/L (ref 22–29)
HCT VFR BLD AUTO: 42 % (ref 35–47)
HGB BLD-MCNC: 14.1 G/DL (ref 11.7–15.7)
HOLD SPECIMEN: NORMAL
HOLD SPECIMEN: NORMAL
IMM GRANULOCYTES # BLD: 0 10E3/UL
IMM GRANULOCYTES NFR BLD: 0 %
LYMPHOCYTES # BLD AUTO: 2.3 10E3/UL (ref 0.8–5.3)
LYMPHOCYTES NFR BLD AUTO: 33 %
MCH RBC QN AUTO: 29.3 PG (ref 26.5–33)
MCHC RBC AUTO-ENTMCNC: 33.6 G/DL (ref 31.5–36.5)
MCV RBC AUTO: 87 FL (ref 78–100)
MONOCYTES # BLD AUTO: 0.7 10E3/UL (ref 0–1.3)
MONOCYTES NFR BLD AUTO: 10 %
NEUTROPHILS # BLD AUTO: 3.9 10E3/UL (ref 1.6–8.3)
NEUTROPHILS NFR BLD AUTO: 55 %
NRBC # BLD AUTO: 0 10E3/UL
NRBC BLD AUTO-RTO: 0 /100
PLATELET # BLD AUTO: 282 10E3/UL (ref 150–450)
POTASSIUM SERPL-SCNC: 4 MMOL/L (ref 3.4–5.3)
RBC # BLD AUTO: 4.82 10E6/UL (ref 3.8–5.2)
SODIUM SERPL-SCNC: 143 MMOL/L (ref 135–145)
TROPONIN T SERPL HS-MCNC: 7 NG/L
WBC # BLD AUTO: 7.2 10E3/UL (ref 4–11)

## 2025-01-01 PROCEDURE — 99284 EMERGENCY DEPT VISIT MOD MDM: CPT

## 2025-01-01 PROCEDURE — 82565 ASSAY OF CREATININE: CPT | Performed by: EMERGENCY MEDICINE

## 2025-01-01 PROCEDURE — 80048 BASIC METABOLIC PNL TOTAL CA: CPT | Performed by: EMERGENCY MEDICINE

## 2025-01-01 PROCEDURE — 36415 COLL VENOUS BLD VENIPUNCTURE: CPT | Performed by: EMERGENCY MEDICINE

## 2025-01-01 PROCEDURE — 85004 AUTOMATED DIFF WBC COUNT: CPT | Performed by: EMERGENCY MEDICINE

## 2025-01-01 PROCEDURE — 84484 ASSAY OF TROPONIN QUANT: CPT | Performed by: EMERGENCY MEDICINE

## 2025-01-01 PROCEDURE — 85048 AUTOMATED LEUKOCYTE COUNT: CPT | Performed by: EMERGENCY MEDICINE

## 2025-01-01 PROCEDURE — 250N000013 HC RX MED GY IP 250 OP 250 PS 637: Performed by: EMERGENCY MEDICINE

## 2025-01-01 PROCEDURE — 93005 ELECTROCARDIOGRAM TRACING: CPT

## 2025-01-01 RX ORDER — HYDROCHLOROTHIAZIDE 12.5 MG/1
12.5 TABLET ORAL DAILY
Qty: 30 TABLET | Refills: 0 | Status: SHIPPED | OUTPATIENT
Start: 2025-01-01

## 2025-01-01 RX ORDER — HYDROCHLOROTHIAZIDE 12.5 MG/1
12.5 TABLET ORAL ONCE
Status: COMPLETED | OUTPATIENT
Start: 2025-01-01 | End: 2025-01-01

## 2025-01-01 RX ADMIN — HYDROCHLOROTHIAZIDE 12.5 MG: 12.5 TABLET ORAL at 17:29

## 2025-01-01 ASSESSMENT — ACTIVITIES OF DAILY LIVING (ADL)
ADLS_ACUITY_SCORE: 47
ADLS_ACUITY_SCORE: 47

## 2025-01-01 ASSESSMENT — COLUMBIA-SUICIDE SEVERITY RATING SCALE - C-SSRS
1. IN THE PAST MONTH, HAVE YOU WISHED YOU WERE DEAD OR WISHED YOU COULD GO TO SLEEP AND NOT WAKE UP?: NO
2. HAVE YOU ACTUALLY HAD ANY THOUGHTS OF KILLING YOURSELF IN THE PAST MONTH?: NO
6. HAVE YOU EVER DONE ANYTHING, STARTED TO DO ANYTHING, OR PREPARED TO DO ANYTHING TO END YOUR LIFE?: NO

## 2025-01-01 NOTE — ED TRIAGE NOTES
Pt c/o HA and vision changes that began around 0700 this morning. Pt denies vision changes at this time. Pt reports AH is generalized and dull. Pt took BP at home and it read high. Pt denies Hx of HTN. Pt states she has Hx of possible stroke or seizure but was never fully diagnosed in 2023.      Triage Assessment (Adult)       Row Name 01/01/25 1544          Triage Assessment    Airway WDL WDL        Respiratory WDL    Respiratory WDL WDL        Cardiac WDL    Cardiac WDL --  Pt is hypertensive        Cognitive/Neuro/Behavioral WDL    Cognitive/Neuro/Behavioral WDL WDL

## 2025-01-01 NOTE — ED PROVIDER NOTES
Emergency Department Note      History of Present Illness     Chief Complaint   Hypertension    HPI   Izabelal Concepcion is a 69 year old female with a history of hyperlipidemia who presents to the ED for hypertension and headache. Around 0700 this morning, the patient began to experience a low-grade headache. She describes the headache as pressure and burning. She also reports some hypertension over the last couple of weeks. Her blood pressures have been greater than 140 systolic. She does not take any medications for hypertension. She denies chest pain or shortness of breath.     Independent Historian   None    Review of External Notes   Discharge summary from 4/3/2023 from Dr. Shaw and ED note from 3/31/2023 from Dr. Mcdaniel where the patient was seen for possible stroke symptoms and had relatively unremarkable workup.  Patient prescribed Depakote per neurology's recommendations.    Past Medical History   Medical History and Problem List   Asthma  Benign colon polyp  Hyperplasia endometrial  GERD  Transient ischemic attach  Postmenopausal bleeding  Uterine prolapse  Generalized anxiety disorder  Hyperlipidemia   Leptomeningeal  Osteopenia  Peripheral visual field defect  Compression fracture  Femur fracture  Asthma  Syncope  Statin intolerance    Medications   Niacin  Sertraline  Divalproex sodium  Omeprazole    Surgical History   Hip arthroplasty  Right arm fracture surgery  Tonsillectomy  Hysterectomy    Physical Exam   Patient Vitals for the past 24 hrs:   BP Temp Temp src Pulse Resp SpO2   01/01/25 1731 (!) 189/85 -- -- 95 16 98 %   01/01/25 1633 (!) 192/97 -- -- 64 -- --   01/01/25 1600 (!) 196/92 -- -- 68 15 97 %   01/01/25 1555 (!) 197/100 -- -- 79 13 97 %   01/01/25 1542 (!) 216/104 97.2  F (36.2  C) Temporal 70 16 98 %     Physical Exam  General:  Sitting on bed.  HENT:  No obvious trauma to head  Right Ear:  External ear normal.   Left Ear:  External ear normal.   Nose:  Nose normal.   Eyes:   Conjunctivae and EOM are normal. Pupils are equal, round, and reactive.   Neck: Normal range of motion. Neck supple. No tracheal deviation present.   CV:  Normal heart sounds. No murmur heard.  Pulm/Chest: Effort normal and breath sounds normal.   Abd: Soft. No distension. There is no tenderness. There is no rigidity, no rebound and no guarding.   M/S: Normal range of motion.   Neuro: Awake and alert. CN II-XII Grossly intact, no pronator drift, normal finger-nose-finger, visual fields intact by confrontation. Muscle strength is +5 proximal and distal in the bilateral upper and lower extremities. No dysarthria. Normal palm up, palm down.  Skin: Skin is warm and dry. No rash noted. Not diaphoretic.   Psych: Normal mood and affect. Behavior is normal.     Diagnostics   Lab Results   Labs Ordered and Resulted from Time of ED Arrival to Time of ED Departure   BASIC METABOLIC PANEL - Normal       Result Value    Sodium 143      Potassium 4.0      Chloride 105      Carbon Dioxide (CO2) 27      Anion Gap 11      Urea Nitrogen 9.0      Creatinine 0.67      GFR Estimate >90      Calcium 10.0      Glucose 95     TROPONIN T, HIGH SENSITIVITY - Normal    Troponin T, High Sensitivity 7     CBC WITH PLATELETS AND DIFFERENTIAL    WBC Count 7.2      RBC Count 4.82      Hemoglobin 14.1      Hematocrit 42.0      MCV 87      MCH 29.3      MCHC 33.6      RDW 12.3      Platelet Count 282      % Neutrophils 55      % Lymphocytes 33      % Monocytes 10      % Eosinophils 2      % Basophils 1      % Immature Granulocytes 0      NRBCs per 100 WBC 0      Absolute Neutrophils 3.9      Absolute Lymphocytes 2.3      Absolute Monocytes 0.7      Absolute Eosinophils 0.2      Absolute Basophils 0.1      Absolute Immature Granulocytes 0.0      Absolute NRBCs 0.0     TROPONIN T, HIGH SENSITIVITY     Imaging   No orders to display     EKG   ECG taken at 1600, ECG read at 1601  Normal sinus rhythm  Low voltage QRS  Incomplete right bundle branch  block  Nonspecific T wave abnormality  Abnormal ECG   No significant change as compared to prior, dated 3/31/2023.  Rate 63 bpm. SC interval 158 ms. QRS duration 92 ms. QT/QTc 424/433 ms. P-R-T axes 68 -24 55.    Independent Interpretation   None    ED Course    Medications Administered   Medications   hydroCHLOROthiazide tablet 12.5 mg (12.5 mg Oral $Given 1/1/25 5311)     Procedures   Procedures     Discussion of Management   None    ED Course   ED Course as of 01/01/25 1926 Wed Jan 01, 2025   1702 I obtained history and examined the patient as noted above.       Additional Documentation  None    Medical Decision Making / Diagnosis   CMS Diagnoses: None    MIPS       None    MDM   Izabella Concepcion is a very pleasant 69 year old female who presents with concern of a mild headache and concerned about her blood pressure.  The patient is not on any medications for her blood pressure.  She is kept a log over the past few weeks and has had multiple elevated systolic blood pressure readings.  Most of been between 149 systolic to 160 systolic.  She has not had any readings lower than that.  Patient is hypertensive here.  She does not have any chest pain.  Screen EKG and troponin are negative.  Creatinine is normal.  She does not have any focal neurologic deficits to suggest acute stroke.  She had an admission back in March 2023 and underwent extensive workup at that time that was relatively unremarkable.  I do not believe repeat advanced imaging of the brain is necessary at this time and after reviewing this with her she is in agreement.  I suspect the patient's mild headache is from her hypertension.  She has no signs of endorgan dysfunction.  Since she is not on medicines, we will initiate hydrochlorothiazide at this time.  Initial dose provided here.  No fever or meningeal signs to suggest meningitis.  Patient had no additional concerns.  She agreed with this treatment plan.  Discussed reasons to return.    The  treatment plan was discussed with the patient and they expressed understanding of this plan and consented to the plan.  In addition, the patient will return to the emergency department if their symptoms persist, worsen, if new symptoms arise or if there is any concern as other pathology may be present that is not evident at this time. They also understand the importance of close follow up in the clinic and if unable to do so will return to the emergency department for a reevaluation. All questions were answered.    Disposition   The patient was discharged.     Diagnosis     ICD-10-CM    1. Hypertension, unspecified type  I10       2. Nonintractable headache, unspecified chronicity pattern, unspecified headache type  R51.9          Discharge Medications   Discharge Medication List as of 1/1/2025  5:26 PM        START taking these medications    Details   hydroCHLOROthiazide 12.5 MG tablet Take 1 tablet (12.5 mg) by mouth daily., Disp-30 tablet, R-0, E-Prescribe           Scribe Disclosure:  Sunny THORPE, am serving as a scribe at 4:20 PM on 1/1/2025 to document services personally performed by Ulysses Issa DO based on my observations and the provider's statements to me.        Ulysses Issa DO  01/01/25 1926

## 2025-01-02 LAB
ATRIAL RATE - MUSE: 63 BPM
DIASTOLIC BLOOD PRESSURE - MUSE: NORMAL MMHG
INTERPRETATION ECG - MUSE: NORMAL
P AXIS - MUSE: 68 DEGREES
PR INTERVAL - MUSE: 158 MS
QRS DURATION - MUSE: 92 MS
QT - MUSE: 424 MS
QTC - MUSE: 433 MS
R AXIS - MUSE: -24 DEGREES
SYSTOLIC BLOOD PRESSURE - MUSE: NORMAL MMHG
T AXIS - MUSE: 55 DEGREES
VENTRICULAR RATE- MUSE: 63 BPM
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
## 2025-03-07 ENCOUNTER — HOSPITAL ENCOUNTER (EMERGENCY)
Facility: CLINIC | Age: 70
Discharge: HOME | End: 2025-03-07
Attending: EMERGENCY MEDICINE | Admitting: EMERGENCY MEDICINE
Payer: MEDICARE

## 2025-03-07 ENCOUNTER — APPOINTMENT (OUTPATIENT)
Dept: MRI IMAGING | Facility: CLINIC | Age: 70
End: 2025-03-07
Attending: EMERGENCY MEDICINE
Payer: MEDICARE

## 2025-03-07 ENCOUNTER — APPOINTMENT (OUTPATIENT)
Dept: CT IMAGING | Facility: CLINIC | Age: 70
End: 2025-03-07
Attending: EMERGENCY MEDICINE
Payer: MEDICARE

## 2025-03-07 ENCOUNTER — TRANSFERRED RECORDS (OUTPATIENT)
Dept: HEALTH INFORMATION MANAGEMENT | Facility: CLINIC | Age: 70
End: 2025-03-07

## 2025-03-07 VITALS
RESPIRATION RATE: 16 BRPM | OXYGEN SATURATION: 95 % | DIASTOLIC BLOOD PRESSURE: 82 MMHG | SYSTOLIC BLOOD PRESSURE: 148 MMHG | TEMPERATURE: 97.6 F | HEART RATE: 74 BPM

## 2025-03-07 DIAGNOSIS — R51.9 NONINTRACTABLE HEADACHE, UNSPECIFIED CHRONICITY PATTERN, UNSPECIFIED HEADACHE TYPE: ICD-10-CM

## 2025-03-07 DIAGNOSIS — R42 DIZZINESS: ICD-10-CM

## 2025-03-07 DIAGNOSIS — M54.12 CERVICAL RADICULOPATHY: ICD-10-CM

## 2025-03-07 LAB
ALBUMIN SERPL BCG-MCNC: 4.4 G/DL (ref 3.5–5.2)
ALP SERPL-CCNC: 90 U/L (ref 40–150)
ALT SERPL W P-5'-P-CCNC: 11 U/L (ref 0–50)
ANION GAP SERPL CALCULATED.3IONS-SCNC: 14 MMOL/L (ref 7–15)
AST SERPL W P-5'-P-CCNC: 26 U/L (ref 0–45)
BASOPHILS # BLD AUTO: 0.1 10E3/UL (ref 0–0.2)
BASOPHILS NFR BLD AUTO: 1 %
BILIRUB SERPL-MCNC: 0.3 MG/DL
BUN SERPL-MCNC: 21.6 MG/DL (ref 8–23)
CALCIUM SERPL-MCNC: 9.9 MG/DL (ref 8.8–10.4)
CHLORIDE SERPL-SCNC: 100 MMOL/L (ref 98–107)
CREAT BLD-MCNC: 0.7 MG/DL (ref 0.5–1)
CREAT SERPL-MCNC: 0.62 MG/DL (ref 0.51–0.95)
EGFRCR SERPLBLD CKD-EPI 2021: >60 ML/MIN/1.73M2
EGFRCR SERPLBLD CKD-EPI 2021: >90 ML/MIN/1.73M2
EOSINOPHIL # BLD AUTO: 0.3 10E3/UL (ref 0–0.7)
EOSINOPHIL NFR BLD AUTO: 3 %
ERYTHROCYTE [DISTWIDTH] IN BLOOD BY AUTOMATED COUNT: 12.7 % (ref 10–15)
GLUCOSE SERPL-MCNC: 123 MG/DL (ref 70–99)
HCO3 SERPL-SCNC: 25 MMOL/L (ref 22–29)
HCT VFR BLD AUTO: 41.9 % (ref 35–47)
HGB BLD-MCNC: 14 G/DL (ref 11.7–15.7)
IMM GRANULOCYTES # BLD: 0 10E3/UL
IMM GRANULOCYTES NFR BLD: 0 %
LYMPHOCYTES # BLD AUTO: 2.8 10E3/UL (ref 0.8–5.3)
LYMPHOCYTES NFR BLD AUTO: 37 %
MCH RBC QN AUTO: 28.7 PG (ref 26.5–33)
MCHC RBC AUTO-ENTMCNC: 33.4 G/DL (ref 31.5–36.5)
MCV RBC AUTO: 86 FL (ref 78–100)
MONOCYTES # BLD AUTO: 0.6 10E3/UL (ref 0–1.3)
MONOCYTES NFR BLD AUTO: 8 %
NEUTROPHILS # BLD AUTO: 4 10E3/UL (ref 1.6–8.3)
NEUTROPHILS NFR BLD AUTO: 51 %
NRBC # BLD AUTO: 0 10E3/UL
NRBC BLD AUTO-RTO: 0 /100
PLATELET # BLD AUTO: 296 10E3/UL (ref 150–450)
POTASSIUM SERPL-SCNC: 3.5 MMOL/L (ref 3.4–5.3)
PROT SERPL-MCNC: 7.3 G/DL (ref 6.4–8.3)
RBC # BLD AUTO: 4.87 10E6/UL (ref 3.8–5.2)
SODIUM SERPL-SCNC: 139 MMOL/L (ref 135–145)
TROPONIN T SERPL HS-MCNC: 7 NG/L
TROPONIN T SERPL HS-MCNC: 8 NG/L
WBC # BLD AUTO: 7.8 10E3/UL (ref 4–11)

## 2025-03-07 PROCEDURE — 82565 ASSAY OF CREATININE: CPT

## 2025-03-07 PROCEDURE — 70553 MRI BRAIN STEM W/O & W/DYE: CPT

## 2025-03-07 PROCEDURE — 70496 CT ANGIOGRAPHY HEAD: CPT

## 2025-03-07 PROCEDURE — 85014 HEMATOCRIT: CPT | Performed by: EMERGENCY MEDICINE

## 2025-03-07 PROCEDURE — 70450 CT HEAD/BRAIN W/O DYE: CPT

## 2025-03-07 PROCEDURE — 70549 MR ANGIOGRAPH NECK W/O&W/DYE: CPT

## 2025-03-07 PROCEDURE — 85004 AUTOMATED DIFF WBC COUNT: CPT | Performed by: EMERGENCY MEDICINE

## 2025-03-07 PROCEDURE — 250N000009 HC RX 250: Performed by: EMERGENCY MEDICINE

## 2025-03-07 PROCEDURE — A9585 GADOBUTROL INJECTION: HCPCS | Performed by: EMERGENCY MEDICINE

## 2025-03-07 PROCEDURE — 93005 ELECTROCARDIOGRAM TRACING: CPT

## 2025-03-07 PROCEDURE — 255N000002 HC RX 255 OP 636: Performed by: EMERGENCY MEDICINE

## 2025-03-07 PROCEDURE — 250N000011 HC RX IP 250 OP 636: Performed by: EMERGENCY MEDICINE

## 2025-03-07 PROCEDURE — 84075 ASSAY ALKALINE PHOSPHATASE: CPT | Performed by: EMERGENCY MEDICINE

## 2025-03-07 PROCEDURE — 99285 EMERGENCY DEPT VISIT HI MDM: CPT | Mod: 25

## 2025-03-07 PROCEDURE — 70544 MR ANGIOGRAPHY HEAD W/O DYE: CPT | Mod: XS

## 2025-03-07 PROCEDURE — 84484 ASSAY OF TROPONIN QUANT: CPT | Performed by: EMERGENCY MEDICINE

## 2025-03-07 PROCEDURE — 36415 COLL VENOUS BLD VENIPUNCTURE: CPT | Performed by: EMERGENCY MEDICINE

## 2025-03-07 PROCEDURE — 82435 ASSAY OF BLOOD CHLORIDE: CPT | Performed by: EMERGENCY MEDICINE

## 2025-03-07 RX ORDER — IOPAMIDOL 755 MG/ML
67 INJECTION, SOLUTION INTRAVASCULAR ONCE
Status: COMPLETED | OUTPATIENT
Start: 2025-03-07 | End: 2025-03-07

## 2025-03-07 RX ORDER — GADOBUTROL 604.72 MG/ML
7 INJECTION INTRAVENOUS ONCE
Status: DISCONTINUED | OUTPATIENT
Start: 2025-03-07 | End: 2025-03-08 | Stop reason: HOSPADM

## 2025-03-07 RX ORDER — GADOBUTROL 604.72 MG/ML
10 INJECTION INTRAVENOUS ONCE
Status: COMPLETED | OUTPATIENT
Start: 2025-03-07 | End: 2025-03-07

## 2025-03-07 RX ADMIN — IOPAMIDOL 67 ML: 755 INJECTION, SOLUTION INTRAVENOUS at 21:03

## 2025-03-07 RX ADMIN — GADOBUTROL 10 ML: 604.72 INJECTION INTRAVENOUS at 22:33

## 2025-03-07 RX ADMIN — SODIUM CHLORIDE 100 ML: 9 INJECTION, SOLUTION INTRAVENOUS at 21:03

## 2025-03-07 ASSESSMENT — ACTIVITIES OF DAILY LIVING (ADL)
ADLS_ACUITY_SCORE: 47

## 2025-03-08 LAB
ATRIAL RATE - MUSE: 77 BPM
DIASTOLIC BLOOD PRESSURE - MUSE: NORMAL MMHG
INTERPRETATION ECG - MUSE: NORMAL
P AXIS - MUSE: 70 DEGREES
PR INTERVAL - MUSE: 162 MS
QRS DURATION - MUSE: 94 MS
QT - MUSE: 386 MS
QTC - MUSE: 436 MS
R AXIS - MUSE: -41 DEGREES
SYSTOLIC BLOOD PRESSURE - MUSE: NORMAL MMHG
T AXIS - MUSE: 75 DEGREES
VENTRICULAR RATE- MUSE: 77 BPM

## 2025-03-08 NOTE — DISCHARGE INSTRUCTIONS
Discharge Instructions  Dizziness (Lightheaded)  Today you were seen for dizziness.  Dizziness can be caused by many things and it can be very difficult to determine the cause of dizziness.  At this time, your provider has found no signs that your dizziness is due to a serious or life-threatening condition. However, sometimes there is a serious problem that does not show up right away, and it is important for you to follow up with your regular provider as instructed.  Generally, every Emergency Department visit should have a follow-up clinic visit with either a primary or a specialty clinic/provider. Please follow-up as instructed by your emergency provider today.      Return to the Emergency Department if:    You pass out (fainting or falling out), especially during exercise.    You develop chest pain, chest pressure or difficulty breathing.  Your feel an irregular heartbeat.  You have excessive vaginal bleeding, or blood in your stool or vomit (throw up).  You have a high fever.  Your symptoms get worse or more frequent.    If when you begin to feel dizzy or lightheaded, it is important to sit down or lay down immediately to prevent injury from falling.  If you were given a prescription for medicine here today, be sure to read all of the information (including the package insert) that comes with your prescription.  This will include important information about the medicine, its side effects, and any warnings that you need to know about.  The pharmacist who fills the prescription can provide more information and answer questions you may have about the medicine.  If you have questions or concerns that the pharmacist cannot address, please call or return to the Emergency Department.   Remember that you can always come back to the Emergency Department if you are not able to see your regular provider in the amount of time listed above, if you get any new symptoms, or if there is anything that worries  you.      Discharge Instructions  Headache    You were seen today for a headache. Headaches may be caused by many different things such as muscle tension, sinus inflammation, anxiety and stress, having too little sleep, too much alcohol, some medical conditions or injury. You may have a migraine, which is caused by changes in the blood vessels in your head.  At this time your provider does not find that your headache is a sign of anything dangerous or life-threatening.  However, sometimes the signs of serious illness do not show up right away.      Generally, every Emergency Department visit should have a follow-up clinic visit with either a primary or a specialty clinic/provider. Please follow-up as instructed by your emergency provider today.    Return to the Emergency Department if:  You get a new fever of 100.4 F or higher.  Your headache gets much worse.  You get a stiff neck with your headache.  You get a new headache that is significantly different or worse than headaches you have had before.  You are vomiting (throwing up) and cannot keep food or water down.  You have blurry or double vision or other problems with your eyes.  You have a new weakness on one side of your body.  You have difficulty with balance which is new.  You or your family thinks you are confused.  You have a seizure.    What can I do to help myself?  Pain medications - You may take a pain medication such as Tylenol  (acetaminophen), Advil , Motrin  (ibuprofen) or Aleve  (naproxen).  Take a pain reliever as soon as you notice symptoms.  Starting medications as soon as you start to have symptoms may lessen the amount of pain you have.  Relaxing in a quiet, dark room may help.  Get enough sleep and eat meals regularly.  You may need to watch for certain foods or other things which may trigger your headaches.  Keeping a journal of your headaches and possible triggers may help you and your primary provider to identify things which you should  avoid which may be causing your headaches.  If you were given a prescription for medicine here today, be sure to read all of the information (including the package insert) that comes with your prescription.  This will include important information about the medicine, its side effects, and any warnings that you need to know about.  The pharmacist who fills the prescription can provide more information and answer questions you may have about the medicine.  If you have questions or concerns that the pharmacist cannot address, please call or return to the Emergency Department.   Remember that you can always come back to the Emergency Department if you are not able to see your regular provider in the amount of time listed above, if you get any new symptoms, or if there is anything that worries you.

## 2025-03-08 NOTE — ED PROVIDER NOTES
"  Emergency Department Note      History of Present Illness     Chief Complaint   Dizziness and Numbness    HPI   Izabella Concepcion is a right-handed 69 year old female with a history as noted below who presents to the ED for dizziness and numbness. Yesterday, around 1400, the patient was seen by a chiropractor where she was adjusted, including her neck and back. Her chiropractor also pushed on her right neck/shoulder muscle that subsequently shot pain to her back and forehead. After the chiropractor pushed on this area, she almost fell over. She states she lost her orientation. She has had six additional episodes where she \"loses her orientation.\" This typically happens when she lays down or gets up. This morning, she woke up and noted entire face and left arm numbness. Later tonight, she was experiencing left arm weakness. Currently, she states she is scared to lay down. She notes that she has been unsteady when ambulating, stating her legs do not feel as strong. She also endorses a headache localized to her forehead. No vision changes. No chest pain, shortness of breath, or palpitations. She is not on blood thinners.     Independent Historian   None    Review of External Notes   None    Past Medical History   Medical History and Problem List   Asthma  Benign colon polyp  Hyperplasia endometrial  GERD  Transient ischemic attack  Postmenopausal bleeding  Uterine prolapse  Generalized anxiety disorder  Hyperlipidemia   Leptomeningeal  Osteopenia  Peripheral visual field defect  Compression fracture  Femur fracture  Asthma  Syncope  Statin intolerance     Medications   Niacin  Sertraline  Divalproex sodium  Omeprazole     Surgical History   Hip arthroplasty  Right arm fracture surgery  Tonsillectomy  Hysterectomy    Physical Exam   Patient Vitals for the past 24 hrs:   BP Temp Temp src Pulse Resp SpO2   03/07/25 2331 (!) 148/82 -- -- 74 16 95 %   03/07/25 2008 (!) 163/89 97.6  F (36.4  C) Temporal 77 16 97 % "     Physical Exam  Nursing note and vitals reviewed.  Constitutional:  Oriented to person, place, and time. Vital signs are normal. Cooperative.   HENT:   Mouth/Throat:   Oropharynx is clear and moist and mucous membranes are normal.   Eyes:    Conjunctivae are normal.      Pupils are equal, round, and reactive to light.   Neck:    Trachea normal and normal range of motion.   Cardiovascular:  Normal rate, regular rhythm and normal heart sounds.    Pulses:   Radial pulses are 2+ bilaterally. Dorsalis pedis pulses are 2+ bilaterally.   Pulmonary/Chest:  Effort normal.   Abdominal:   Soft. Normal appearance and bowel sounds are normal.      Exhibits no distension. There is no tenderness.      There is no rebound and no CVA tenderness.   Musculoskeletal:  Extremities atraumatic x 4.   Lymphadenopathy:  No cervical adenopathy.   Neurological:   No nystagmus present.  Alert and oriented to person, place, and time. Normal strength and normal reflexes. Not disoriented. No cranial nerve deficit or sensory deficit. Displays a negative Romberg sign. Coordination normal. GCS eye subscore is 4. GCS verbal subscore is 5. GCS motor subscore is 6. Visual fields intact. No pronator drift. Normal Finger-to-Nose and Rapid Alternating Movement.  Some difficulty with heel-to-shin.  Slightly wide-based gait present.  Skin:    Skin is warm, dry and intact.      No rash noted.   Psychiatric:   Normal mood and affect. Speech is normal and behavior is normal. Judgment normal. Cognition and memory are normal.    Diagnostics   Lab Results   Labs Ordered and Resulted from Time of ED Arrival to Time of ED Departure   COMPREHENSIVE METABOLIC PANEL - Abnormal       Result Value    Sodium 139      Potassium 3.5      Carbon Dioxide (CO2) 25      Anion Gap 14      Urea Nitrogen 21.6      Creatinine 0.62      GFR Estimate >90      Calcium 9.9      Chloride 100      Glucose 123 (*)     Alkaline Phosphatase 90      AST 26      ALT 11      Protein Total  7.3      Albumin 4.4      Bilirubin Total 0.3     TROPONIN T, HIGH SENSITIVITY - Normal    Troponin T, High Sensitivity 8     ISTAT CREATININE POCT - Normal    Creatinine POCT 0.7      GFR, ESTIMATED POCT >60     TROPONIN T, HIGH SENSITIVITY - Normal    Troponin T, High Sensitivity 7     CBC WITH PLATELETS AND DIFFERENTIAL    WBC Count 7.8      RBC Count 4.87      Hemoglobin 14.0      Hematocrit 41.9      MCV 86      MCH 28.7      MCHC 33.4      RDW 12.7      Platelet Count 296      % Neutrophils 51      % Lymphocytes 37      % Monocytes 8      % Eosinophils 3      % Basophils 1      % Immature Granulocytes 0      NRBCs per 100 WBC 0      Absolute Neutrophils 4.0      Absolute Lymphocytes 2.8      Absolute Monocytes 0.6      Absolute Eosinophils 0.3      Absolute Basophils 0.1      Absolute Immature Granulocytes 0.0      Absolute NRBCs 0.0       Imaging   MR Brain w/o & w Contrast   Final Result   IMPRESSION:   HEAD MRI:   1.  No acute findings.   2.  Mild age-related changes.      HEAD MRA:   1. No stenosis/occlusion, aneurysm, or high flow vascular malformation.   2. 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      NECK MRA:   No measurable stenosis or dissection.         MRA Angiogram Head w/o Contrast   Final Result   IMPRESSION:   HEAD MRI:   1.  No acute findings.   2.  Mild age-related changes.      HEAD MRA:   1. No stenosis/occlusion, aneurysm, or high flow vascular malformation.   2. 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      NECK MRA:   No measurable stenosis or dissection.         MRA Angiogram Neck w/o & w Contrast   Final Result   IMPRESSION:   HEAD MRI:   1.  No acute findings.   2.  Mild age-related changes.      HEAD MRA:   1. No stenosis/occlusion, aneurysm, or high flow vascular malformation.   2. 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      NECK MRA:   No measurable stenosis or dissection.         CTA Head Neck w  Contrast   Final Result   IMPRESSION:    HEAD CTA:   1.  Focal intraluminal filling defect in the proximal basilar artery contacts to posterior wall of the basilar artery, concerning for age-indeterminate thrombus. Finding is new from remote CT angiogram of 3/31/2023, but otherwise age indeterminate.    Consider MR angiogram.   2.  No additional significant stenosis or occlusion.      NECK CTA:   1.  No hemodynamically significant stenosis in the neck vessels.   2.  No evidence for dissection.   3.  Consider MR angiogram of the neck to evaluate for dissection.            CT Head w/o Contrast   Final Result   IMPRESSION:   1.  No CT evidence for acute intracranial process.   2.  Brain atrophy and mild chronic microvascular ischemic changes as above.        EKG   ECG taken at 2117, ECG read at 2118  Normal sinus rhythm  Left axis deviation  Low voltage QRS  Incomplete right bundle branch block  Nonspecific ST abnormality  Abnormal ECG   No significant change as compared to prior, dated 1/1/2025.  Rate 77 bpm. CA interval 162 ms. QRS duration 94 ms. QT/QTc 386/436 ms. P-R-T axes 70 -41 75.    Independent Interpretation   I independently interpreted the patient's noncontrast head CT and I do not appreciate an intracranial hemorrhage.    ED Course    Medications Administered   Medications   gadobutrol (GADAVIST) injection 7 mL (has no administration in time range)   sodium chloride (PF) 0.9% PF flush 10 mL (has no administration in time range)   iopamidol (ISOVUE-370) solution 67 mL (67 mLs Intravenous $Given 3/7/25 2103)   sodium chloride 0.9 % bag 500 mL for CT scan flush use (100 mLs Intravenous $Given 3/7/25 2103)   gadobutrol (GADAVIST) injection 10 mL (10 mLs Intravenous $Given 3/7/25 2233)     Procedures   Procedures     Discussion of Management   None    ED Course   ED Course as of 03/07/25 2334   Fri Mar 07, 2025   2018 I obtained history and examined the patient as noted above.     2309 I rechecked and updated  the patient. The patient is comfortable with plan for discharge.       Additional Documentation  None    Medical Decision Making / Diagnosis   CMS Diagnoses: None    MIPS       None    MDM   Izabella Concepcion is a 69 year old female who came in for further evaluation of paresthesias, headaches, and dizziness.  This is in the setting of having undergone a neck adjustment yesterday by a chiropractor.  Her exam here was essentially unremarkable other than a slightly unsteady gait and difficulty with heel-to-shin.  Nonetheless, I was concerned that she might have a vertebral artery dissection or a posterior circulation stroke.  I proceeded with the above workup here including EKG and blood work, as well as a CT and CTA of her head and neck.  I also obtained an MRI of her brain as well as the MRA of her head and neck after the possible abnormal CTA of her neck came back.  Thankfully, the MRA is unremarkable.  I suspect that she likely does have a cervical radiculopathy.  However I feel that she is safe for discharge and outpatient management.  I recommended avoiding any additional or future neck adjustments.  She has an outpatient MRI scheduled of her cervical spine from [facility] as well in just a few days.  I am providing her the contact information for primary care clinic as well, as she does not have one.  However she is obviously welcome to go to any primary care clinic of her choice.  She should return with any concerns or worsening symptoms as well.    Disposition   The patient was discharged.     Diagnosis     ICD-10-CM    1. Possible Cervical radiculopathy  M54.12       2. Dizziness  R42       3. Nonintractable headache, unspecified chronicity pattern, unspecified headache type  R51.9          Discharge Medications   Discharge Medication List as of 3/7/2025 11:25 PM        Scribe Disclosure:  I, Sunny Guevara, am serving as a scribe at 8:28 PM on 3/7/2025 to document services personally performed by Jarrod  Juventino JOHNS MD based on my observations and the provider's statements to me.        Juventino Garay MD  03/07/25 [id number]       Juventino Garay MD  03/07/25 [id number]

## 2025-03-08 NOTE — ED TRIAGE NOTES
Pt c/o dizziness starting yesterday after the chiropractor, pt woke up today w/ L arm numbness and weakness, no hx of stroke, pt c/o headache this week, ABCD intact.       Triage Assessment (Adult)       Row Name 03/07/25 2009          Triage Assessment    Airway WDL WDL        Respiratory WDL    Respiratory WDL WDL        Skin Circulation/Temperature WDL    Skin Circulation/Temperature WDL WDL        Cardiac WDL    Cardiac WDL WDL        Peripheral/Neurovascular WDL    Peripheral Neurovascular WDL WDL        Cognitive/Neuro/Behavioral WDL    Cognitive/Neuro/Behavioral WDL WDL

## 2025-03-12 ENCOUNTER — TRANSFERRED RECORDS (OUTPATIENT)
Dept: HEALTH INFORMATION MANAGEMENT | Facility: CLINIC | Age: 70
End: 2025-03-12
Payer: MEDICARE

## 2025-03-27 ENCOUNTER — ANCILLARY ORDERS (OUTPATIENT)
Dept: MAMMOGRAPHY | Facility: CLINIC | Age: 70
End: 2025-03-27
Payer: MEDICARE

## 2025-03-27 DIAGNOSIS — Z12.31 VISIT FOR SCREENING MAMMOGRAM: Primary | ICD-10-CM

## 2025-04-23 ENCOUNTER — TELEPHONE (OUTPATIENT)
Dept: CARDIOLOGY | Facility: CLINIC | Age: 70
End: 2025-04-23
Payer: MEDICARE

## 2025-04-23 NOTE — TELEPHONE ENCOUNTER
[facility] Call Center    Phone Message    May a detailed message be left on voicemail: yes     Reason for Call: Pt is requesting for cardiology records to be sent to [facility] @ [facility]: [phone], attn: Yessenia Hutchins.     Clinic Phone: [phone]       Action Taken: Other: CARDIO    Travel Screening: Not Applicable     Date of Service:

## 2025-04-23 NOTE — TELEPHONE ENCOUNTER
Message left for patient to call medical records to make this request, phone number provided.  Melida Gutierrez RN on 4/23/2025 at 10:14 AM

## 2025-06-15 ENCOUNTER — HEALTH MAINTENANCE LETTER (OUTPATIENT)
Age: 70
End: 2025-06-15

## 2025-06-20 ENCOUNTER — LAB (OUTPATIENT)
Dept: LAB | Facility: CLINIC | Age: 70
End: 2025-06-20
Payer: MEDICARE

## 2025-06-20 DIAGNOSIS — R94.31 ABNORMAL ECG: Primary | ICD-10-CM

## 2025-06-20 LAB
ALBUMIN SERPL BCG-MCNC: 4.5 G/DL (ref 3.5–5.2)
ALP SERPL-CCNC: 84 U/L (ref 40–150)
ALT SERPL W P-5'-P-CCNC: 11 U/L (ref 0–50)
ANION GAP SERPL CALCULATED.3IONS-SCNC: 13 MMOL/L (ref 7–15)
AST SERPL W P-5'-P-CCNC: 24 U/L (ref 0–45)
BILIRUB SERPL-MCNC: 0.8 MG/DL
BUN SERPL-MCNC: 10.7 MG/DL (ref 8–23)
CALCIUM SERPL-MCNC: 10 MG/DL (ref 8.8–10.4)
CHLORIDE SERPL-SCNC: 98 MMOL/L (ref 98–107)
CHOLEST SERPL-MCNC: 307 MG/DL
CREAT SERPL-MCNC: 0.61 MG/DL (ref 0.51–0.95)
CRP SERPL-MCNC: <3 MG/L
EGFRCR SERPLBLD CKD-EPI 2021: >90 ML/MIN/1.73M2
FASTING STATUS PATIENT QL REPORTED: YES
GLUCOSE SERPL-MCNC: 92 MG/DL (ref 70–99)
HCO3 SERPL-SCNC: 25 MMOL/L (ref 22–29)
HCYS SERPL-SCNC: 10 UMOL/L (ref 0–15)
HDLC SERPL-MCNC: 58 MG/DL
LDLC SERPL CALC-MCNC: 222 MG/DL
NONHDLC SERPL-MCNC: 249 MG/DL
POTASSIUM SERPL-SCNC: 3.9 MMOL/L (ref 3.4–5.3)
PROT SERPL-MCNC: 7.1 G/DL (ref 6.4–8.3)
SODIUM SERPL-SCNC: 136 MMOL/L (ref 135–145)
TRIGL SERPL-MCNC: 135 MG/DL

## 2025-06-20 PROCEDURE — 36415 COLL VENOUS BLD VENIPUNCTURE: CPT

## 2025-06-20 PROCEDURE — 83090 ASSAY OF HOMOCYSTEINE: CPT

## 2025-06-20 PROCEDURE — 80061 LIPID PANEL: CPT

## 2025-06-20 PROCEDURE — 86140 C-REACTIVE PROTEIN: CPT

## 2025-06-20 PROCEDURE — 84155 ASSAY OF PROTEIN SERUM: CPT
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
## (undated) DEVICE — IMM PILLOW ABDUCT HIP MED 31143061

## (undated) DEVICE — BLADE KNIFE BEAVER 60DEG BEAVER6600

## (undated) DEVICE — SU ETHIBOND 2 V-37 4X30" MX69G

## (undated) DEVICE — SU VICRYL 2-0 CT-1 27" UND J259H

## (undated) DEVICE — PAD FOAM MCGUIRE KIT 0814-0150

## (undated) DEVICE — PREP CHLORAPREP 26ML TINTED ORANGE  260815

## (undated) DEVICE — PREP SKIN SCRUB TRAY 4461A

## (undated) DEVICE — ESU PENCIL W/SMOKE EVAC NEPTUNE STRYKER 0703-046-000

## (undated) DEVICE — GLOVE PROTEXIS BLUE W/NEU-THERA 8.0  2D73EB80

## (undated) DEVICE — DRSG STERI STRIP 1/2X4" R1547

## (undated) DEVICE — GLOVE PROTEXIS W/NEU-THERA 7.5  2D73TE75

## (undated) DEVICE — DRSG AQUACEL AG 3.5X6.0" HYDROFIBER 412010

## (undated) DEVICE — SU FIBERWIRE 2 38"  AR-7200

## (undated) DEVICE — BLADE SAW SAGITTAL STRK 25X79.5X1.24MM 4/2000 2108-318-000

## (undated) DEVICE — SOL WATER IRRIG 1000ML BOTTLE 2F7114

## (undated) DEVICE — SUCTION IRR SYSTEM W/O TIP INTERPULSE HANDPIECE 0210-100-000

## (undated) DEVICE — PIN STEINMAN 1/8"

## (undated) DEVICE — DRILL BIT FLEXIBLE REFLECTION 25MM  71362925

## (undated) DEVICE — SOL BENZOIN 0.5OZ

## (undated) DEVICE — GLOVE PROTEXIS W/NEU-THERA 8.0  2D73TE80

## (undated) DEVICE — BONE CLEANING TIP INTERPULSE FEMORAL CANAL 0210-008-000

## (undated) DEVICE — DRAIN ROUND W/RESERV KIT JACKSON PRATT 10FR 400ML SU130-402D

## (undated) DEVICE — IMP SHELL SNR ACET R3 3H 52MM 71335552: Type: IMPLANTABLE DEVICE | Site: HIP | Status: NON-FUNCTIONAL

## (undated) DEVICE — SOLUTION WOUND CLEANSING 3/4OZ 10% PVP EA-L3011FB-50

## (undated) DEVICE — LINEN TOWEL PACK X5 5464

## (undated) DEVICE — CATH TRAY FOLEY SURESTEP 16FR WDRAIN BAG STLK LATEX A300316A

## (undated) DEVICE — BONE CEMENT MIXEVAC HI VAC W/CARTRIDGE 0306-563-000

## (undated) DEVICE — SPONGE LAP 18X18" X8435

## (undated) DEVICE — GLOVE PROTEXIS MICRO 8.0  2D73PM80

## (undated) DEVICE — SOL NACL 0.9% IRRIG 1000ML BOTTLE 07138-09

## (undated) DEVICE — LIGHT FLEX 3034A

## (undated) DEVICE — DRSG KERLIX FLUFFS X5

## (undated) DEVICE — PACK TOTAL HIP W/POUCH SOP15HPFSM

## (undated) DEVICE — DRAPE IOBAN INCISE 23X17" 6650EZ

## (undated) DEVICE — MANIFOLD NEPTUNE 4 PORT 700-20

## (undated) DEVICE — ESU GROUND PAD UNIVERSAL W/O CORD

## (undated) DEVICE — SU VICRYL 1 CTX CR 8X18" J765D

## (undated) RX ORDER — FENTANYL CITRATE 50 UG/ML
INJECTION, SOLUTION INTRAMUSCULAR; INTRAVENOUS
Status: DISPENSED
Start: 2018-10-21

## (undated) RX ORDER — DEXAMETHASONE SODIUM PHOSPHATE 4 MG/ML
INJECTION, SOLUTION INTRA-ARTICULAR; INTRALESIONAL; INTRAMUSCULAR; INTRAVENOUS; SOFT TISSUE
Status: DISPENSED
Start: 2018-10-21

## (undated) RX ORDER — PROPOFOL 10 MG/ML
INJECTION, EMULSION INTRAVENOUS
Status: DISPENSED
Start: 2018-10-21

## (undated) RX ORDER — KETOROLAC TROMETHAMINE 30 MG/ML
INJECTION, SOLUTION INTRAMUSCULAR; INTRAVENOUS
Status: DISPENSED
Start: 2018-10-21

## (undated) RX ORDER — EPINEPHRINE IN SOD CHLOR,ISO 1 MG/10 ML
SYRINGE (ML) INTRAVENOUS
Status: DISPENSED
Start: 2023-01-07

## (undated) RX ORDER — LIDOCAINE HYDROCHLORIDE 20 MG/ML
INJECTION, SOLUTION EPIDURAL; INFILTRATION; INTRACAUDAL; PERINEURAL
Status: DISPENSED
Start: 2018-10-21

## (undated) RX ORDER — CEFAZOLIN SODIUM 1 G/3ML
INJECTION, POWDER, FOR SOLUTION INTRAMUSCULAR; INTRAVENOUS
Status: DISPENSED
Start: 2018-10-21

## (undated) RX ORDER — HYDROMORPHONE HYDROCHLORIDE 1 MG/ML
INJECTION, SOLUTION INTRAMUSCULAR; INTRAVENOUS; SUBCUTANEOUS
Status: DISPENSED
Start: 2018-10-21

## (undated) RX ORDER — FENTANYL CITRATE 50 UG/ML
INJECTION, SOLUTION INTRAMUSCULAR; INTRAVENOUS
Status: DISPENSED
Start: 2023-01-07

## (undated) RX ORDER — ACYCLOVIR 200 MG/1
CAPSULE ORAL
Status: DISPENSED
Start: 2018-10-21

## (undated) RX ORDER — GLYCOPYRROLATE 0.2 MG/ML
INJECTION, SOLUTION INTRAMUSCULAR; INTRAVENOUS
Status: DISPENSED
Start: 2018-10-21

## (undated) RX ORDER — NEOSTIGMINE METHYLSULFATE 1 MG/ML
VIAL (ML) INJECTION
Status: DISPENSED
Start: 2018-10-21

## (undated) RX ORDER — ONDANSETRON 2 MG/ML
INJECTION INTRAMUSCULAR; INTRAVENOUS
Status: DISPENSED
Start: 2018-10-21

## (undated) RX ORDER — ROPIVACAINE HYDROCHLORIDE 2 MG/ML
INJECTION, SOLUTION EPIDURAL; INFILTRATION; PERINEURAL
Status: DISPENSED
Start: 2018-10-21